# Patient Record
Sex: FEMALE | Race: WHITE | NOT HISPANIC OR LATINO | Employment: FULL TIME | URBAN - METROPOLITAN AREA
[De-identification: names, ages, dates, MRNs, and addresses within clinical notes are randomized per-mention and may not be internally consistent; named-entity substitution may affect disease eponyms.]

---

## 2017-03-16 ENCOUNTER — OFFICE VISIT (OUTPATIENT)
Dept: FAMILY MEDICINE CLINIC | Facility: CLINIC | Age: 47
End: 2017-03-16

## 2017-03-16 ENCOUNTER — APPOINTMENT (OUTPATIENT)
Dept: LAB | Facility: HOSPITAL | Age: 47
End: 2017-03-16

## 2017-03-16 VITALS
WEIGHT: 202 LBS | BODY MASS INDEX: 34.67 KG/M2 | TEMPERATURE: 98.2 F | OXYGEN SATURATION: 99 % | DIASTOLIC BLOOD PRESSURE: 82 MMHG | SYSTOLIC BLOOD PRESSURE: 106 MMHG | HEART RATE: 108 BPM

## 2017-03-16 DIAGNOSIS — I95.89 OTHER SPECIFIED HYPOTENSION: ICD-10-CM

## 2017-03-16 DIAGNOSIS — R10.13 EPIGASTRIC ABDOMINAL PAIN: Primary | ICD-10-CM

## 2017-03-16 DIAGNOSIS — R00.0 TACHYCARDIA: ICD-10-CM

## 2017-03-16 DIAGNOSIS — R42 DIZZINESS: ICD-10-CM

## 2017-03-16 LAB
ALBUMIN SERPL-MCNC: 4.5 G/DL (ref 3.2–4.8)
ALBUMIN/GLOB SERPL: 1.8 G/DL (ref 1.5–2.5)
ALP SERPL-CCNC: 68 U/L (ref 25–100)
ALT SERPL W P-5'-P-CCNC: 14 U/L (ref 7–40)
AMYLASE SERPL-CCNC: 42 U/L (ref 30–118)
ANION GAP SERPL CALCULATED.3IONS-SCNC: 4 MMOL/L (ref 3–11)
AST SERPL-CCNC: 14 U/L (ref 0–33)
BILIRUB SERPL-MCNC: 0.4 MG/DL (ref 0.3–1.2)
BUN BLD-MCNC: 12 MG/DL (ref 9–23)
BUN/CREAT SERPL: 17.1 (ref 7–25)
CALCIUM SPEC-SCNC: 9.7 MG/DL (ref 8.7–10.4)
CHLORIDE SERPL-SCNC: 105 MMOL/L (ref 99–109)
CO2 SERPL-SCNC: 31 MMOL/L (ref 20–31)
CREAT BLD-MCNC: 0.7 MG/DL (ref 0.6–1.3)
ERYTHROCYTE [DISTWIDTH] IN BLOOD BY AUTOMATED COUNT: 13.3 % (ref 11.3–14.5)
GFR SERPL CREATININE-BSD FRML MDRD: 90 ML/MIN/1.73
GLOBULIN UR ELPH-MCNC: 2.5 GM/DL
GLUCOSE BLD-MCNC: 83 MG/DL (ref 70–100)
HCT VFR BLD AUTO: 35.8 % (ref 34.5–44)
HGB BLD-MCNC: 11.8 G/DL (ref 11.5–15.5)
LIPASE SERPL-CCNC: 48 U/L (ref 6–51)
LYMPHOCYTES # BLD AUTO: 3.4 10*3/MM3 (ref 0.6–4.8)
LYMPHOCYTES NFR BLD AUTO: 24.2 % (ref 24–44)
MCH RBC QN AUTO: 28.6 PG (ref 27–31)
MCHC RBC AUTO-ENTMCNC: 32.9 G/DL (ref 32–36)
MCV RBC AUTO: 86.8 FL (ref 80–99)
MONOCYTES # BLD AUTO: 0.7 10*3/MM3 (ref 0–1)
MONOCYTES NFR BLD AUTO: 5 % (ref 0–12)
NEUTROPHILS # BLD AUTO: 9.9 10*3/MM3 (ref 1.5–8.3)
NEUTROPHILS NFR BLD AUTO: 70.8 % (ref 41–71)
PLATELET # BLD AUTO: 438 10*3/MM3 (ref 150–450)
PMV BLD AUTO: 8.8 FL (ref 6–12)
POTASSIUM BLD-SCNC: 4.1 MMOL/L (ref 3.5–5.5)
PROT SERPL-MCNC: 7 G/DL (ref 5.7–8.2)
RBC # BLD AUTO: 4.13 10*6/MM3 (ref 3.89–5.14)
SODIUM BLD-SCNC: 140 MMOL/L (ref 132–146)
WBC NRBC COR # BLD: 14 10*3/MM3 (ref 3.5–10.8)

## 2017-03-16 PROCEDURE — 36415 COLL VENOUS BLD VENIPUNCTURE: CPT | Performed by: PHYSICIAN ASSISTANT

## 2017-03-16 PROCEDURE — 83690 ASSAY OF LIPASE: CPT | Performed by: PHYSICIAN ASSISTANT

## 2017-03-16 PROCEDURE — 82150 ASSAY OF AMYLASE: CPT | Performed by: PHYSICIAN ASSISTANT

## 2017-03-16 PROCEDURE — 85025 COMPLETE CBC W/AUTO DIFF WBC: CPT | Performed by: PHYSICIAN ASSISTANT

## 2017-03-16 PROCEDURE — 80053 COMPREHEN METABOLIC PANEL: CPT | Performed by: PHYSICIAN ASSISTANT

## 2017-03-16 PROCEDURE — 99214 OFFICE O/P EST MOD 30 MIN: CPT | Performed by: PHYSICIAN ASSISTANT

## 2017-03-16 RX ORDER — TRIAMTERENE AND HYDROCHLOROTHIAZIDE 37.5; 25 MG/1; MG/1
TABLET ORAL
Qty: 30 TABLET | Refills: 0 | Status: SHIPPED | OUTPATIENT
Start: 2017-03-16 | End: 2017-07-03 | Stop reason: SDUPTHER

## 2017-03-16 RX ORDER — SUCRALFATE 1 G/1
1 TABLET ORAL 4 TIMES DAILY
Qty: 40 TABLET | Refills: 0 | Status: SHIPPED | OUTPATIENT
Start: 2017-03-16 | End: 2018-03-20

## 2017-03-16 RX ORDER — ONDANSETRON HYDROCHLORIDE 8 MG/1
8 TABLET, FILM COATED ORAL EVERY 8 HOURS PRN
Qty: 15 TABLET | Refills: 0 | Status: SHIPPED | OUTPATIENT
Start: 2017-03-16 | End: 2019-03-26 | Stop reason: SDUPTHER

## 2017-03-16 RX ORDER — PANTOPRAZOLE SODIUM 40 MG/1
40 TABLET, DELAYED RELEASE ORAL DAILY
Qty: 30 TABLET | Refills: 0 | Status: SHIPPED | OUTPATIENT
Start: 2017-03-16 | End: 2018-03-20

## 2017-03-16 NOTE — PROGRESS NOTES
Subjective   Aniya Castro is a 46 y.o. female    History of Present Illness    Patient presents today for evaluation of acute abdominal pain.she states the pain started on Friday midday.  She had eaten check folate for breakfast and started developing nausea and some midepigastric abdominal pain and upper chest discomfort around lunchtime.  She felt that she was having some indigestion from the meal that she had for breakfast.  However she states that progressed to where her abdominal pain became more intense moving somewhat to the left and she ended up vomiting a couple of times over the next several days.  Last time she threw up was yesterday.  She states the vomiting has been infrequent.  Her bowel movements have been daily, no diarrhea but she has noticed the color change and word is black and tarry.  She states that she has increased abdominal pain after eating any meal.  She has not run a fever.  She has no history of GI problems and nothing similar to this in the past.  No history of peptic ulcer disease.  She does not take NSAIDs.  She states she has felt dizzy and weak over the past few days. She tried Bentyl and it did not help at all.  Mylanta helps minimally for about an hour.  The following portions of the patient's history were reviewed and updated as appropriate: allergies, current medications, past social history and problem list    Review of Systems   Constitutional: Positive for fatigue. Negative for appetite change, chills, fever and unexpected weight change.   Respiratory: Negative for cough, chest tightness and shortness of breath.    Cardiovascular: Negative for chest pain and palpitations.   Gastrointestinal: Positive for abdominal pain. Negative for abdominal distention, anal bleeding, blood in stool, constipation, diarrhea, nausea, rectal pain and vomiting.   Genitourinary: Negative.  Negative for difficulty urinating and dysuria.   Musculoskeletal: Negative for back pain.   Skin:  Negative for color change and rash.   Allergic/Immunologic: Negative for food allergies.   Neurological: Positive for dizziness and light-headedness.       Objective     Vitals:    03/16/17 1002   BP: 106/82   Pulse: 108   Temp: 98.2 °F (36.8 °C)   SpO2: 99%       Physical Exam   Constitutional: She is oriented to person, place, and time. She appears well-developed and well-nourished. No distress.   HENT:   Right Ear: Hearing and tympanic membrane normal.   Left Ear: Hearing and tympanic membrane normal.   Eyes: Conjunctivae are normal.   Neck: Normal carotid pulses present. Carotid bruit is not present.   Cardiovascular: Normal rate, regular rhythm and normal heart sounds.    Pulmonary/Chest: Effort normal and breath sounds normal.   Abdominal: Soft. Bowel sounds are normal. She exhibits no distension and no mass. There is tenderness ( patient has moderate tenderness midepigastrium but more severe pain noted over the left upper quadrant and periumbilical region centrally.  No lower quadrant tenderness.  She does have guarding in her mid epigastrium.). There is no rebound and no guarding. No hernia.   Neurological: She is alert and oriented to person, place, and time. She displays normal reflexes. No cranial nerve deficit. She exhibits normal muscle tone. Coordination normal.   Skin: Skin is warm and dry. No rash noted. She is not diaphoretic. No erythema. There is pallor.   Psychiatric: She has a normal mood and affect. Her behavior is normal.   Nursing note and vitals reviewed.      Assessment/Plan     Diagnoses and all orders for this visit:    Epigastric abdominal pain  -     ondansetron (ZOFRAN) 8 MG tablet; Take 1 tablet by mouth Every 8 (Eight) Hours As Needed for Nausea or Vomiting.  -     sucralfate (CARAFATE) 1 G tablet; Take 1 tablet by mouth 4 (Four) Times a Day.  -     pantoprazole (PROTONIX) 40 MG EC tablet; Take 1 tablet by mouth Daily. Take one each morning  -     CBC & Differential  -      Comprehensive Metabolic Panel  -     Amylase  -     Lipase  -     CBC Auto Differential    Other specified hypotension    Dizziness    Tachycardia    i discussed with patient my concerns of possible GI bleed with the reported history of melena association with hypotension, tachycardia and dizziness with abdominal pain.  We will check stat CBC today and I'll call her back with results.  Advised her to follow a bland diet today.

## 2017-07-03 ENCOUNTER — OFFICE VISIT (OUTPATIENT)
Dept: FAMILY MEDICINE CLINIC | Facility: CLINIC | Age: 47
End: 2017-07-03

## 2017-07-03 VITALS
TEMPERATURE: 98.7 F | OXYGEN SATURATION: 98 % | WEIGHT: 210 LBS | HEART RATE: 68 BPM | DIASTOLIC BLOOD PRESSURE: 98 MMHG | SYSTOLIC BLOOD PRESSURE: 138 MMHG | BODY MASS INDEX: 35.85 KG/M2 | HEIGHT: 64 IN

## 2017-07-03 DIAGNOSIS — I10 ESSENTIAL HYPERTENSION: ICD-10-CM

## 2017-07-03 DIAGNOSIS — E66.3 OVERWEIGHT: ICD-10-CM

## 2017-07-03 DIAGNOSIS — F41.1 ANXIETY, GENERALIZED: ICD-10-CM

## 2017-07-03 DIAGNOSIS — F06.31 ORGANIC MOOD DISORDER OF DEPRESSED TYPE: Primary | ICD-10-CM

## 2017-07-03 PROCEDURE — 99214 OFFICE O/P EST MOD 30 MIN: CPT | Performed by: FAMILY MEDICINE

## 2017-07-03 RX ORDER — TRIAMTERENE AND HYDROCHLOROTHIAZIDE 37.5; 25 MG/1; MG/1
1 TABLET ORAL DAILY
Qty: 30 TABLET | Refills: 5 | Status: SHIPPED | OUTPATIENT
Start: 2017-07-03 | End: 2017-08-08 | Stop reason: DRUGHIGH

## 2017-07-03 RX ORDER — FLUOXETINE HYDROCHLORIDE 20 MG/1
20 CAPSULE ORAL DAILY
Qty: 30 CAPSULE | Refills: 1 | Status: SHIPPED | OUTPATIENT
Start: 2017-07-03 | End: 2017-08-01 | Stop reason: DRUGHIGH

## 2017-07-03 NOTE — PROGRESS NOTES
Subjective   Aniya Castro is a 47 y.o. female    HPI Comments: Patient presents today for recheck of hypertension, depression and weight gain.  Trial of phentermine 6 months ago was stopped due to tongue swelling associated with the phentermine capsule.  She is asking about other options to help with weight loss.  We discussed Contrave and she would like to try this.  She also feels that her depression has resolved and would like to go off the fluoxetine.  We discussed weaning off the medication over the next 6-8 weeks.  Her blood pressure is elevated today at 138/98.  She is out of her triamterene HCTZ medication.    Hypertension   This is a chronic problem. The current episode started more than 1 year ago. The problem is unchanged. The problem is controlled. Associated symptoms include anxiety. Pertinent negatives include no blurred vision, chest pain, headaches, malaise/fatigue, orthopnea, palpitations, peripheral edema, PND, shortness of breath or sweats. There are no associated agents to hypertension. Risk factors for coronary artery disease include family history, obesity and stress. Compliance problems include diet and exercise.    Depression Patient presents with the following symptoms: nervousness/anxiety.  Patient is not experiencing: palpitations and shortness of breath.        The following portions of the patient's history were reviewed and updated as appropriate: allergies, current medications, past social history and problem list    Review of Systems   Constitutional: Negative for fatigue, malaise/fatigue and unexpected weight change.   HENT: Negative.    Eyes: Negative for blurred vision.   Respiratory: Negative for cough, chest tightness and shortness of breath.    Cardiovascular: Negative for chest pain, palpitations, orthopnea, leg swelling and PND.   Gastrointestinal: Negative for nausea.   Endocrine: Negative for polydipsia, polyphagia and polyuria.   Musculoskeletal: Negative for  arthralgias and myalgias.   Skin: Negative for color change and rash.   Neurological: Negative for dizziness, syncope, weakness and headaches.   Hematological: Negative for adenopathy. Does not bruise/bleed easily.   Psychiatric/Behavioral: Negative for dysphoric mood. The patient is nervous/anxious.        Objective     Vitals:    07/03/17 1143   BP: 138/98   Pulse: 68   Temp: 98.7 °F (37.1 °C)   SpO2: 98%       Physical Exam   Constitutional: She is oriented to person, place, and time. She appears well-developed and well-nourished.   HENT:   Head: Normocephalic and atraumatic.   Eyes: Conjunctivae are normal. Pupils are equal, round, and reactive to light.   Neck: No JVD present. No thyromegaly present.   Cardiovascular: Normal rate, regular rhythm, normal heart sounds, intact distal pulses and normal pulses.    No murmur heard.  Pulmonary/Chest: Effort normal and breath sounds normal. No respiratory distress.   Abdominal: Soft. Bowel sounds are normal. There is no hepatosplenomegaly. There is no tenderness.   Musculoskeletal: She exhibits no edema or deformity.   Neurological: She is alert and oriented to person, place, and time.   Skin: Skin is warm and dry.   Psychiatric: She has a normal mood and affect. Her behavior is normal.   Nursing note and vitals reviewed.      Assessment/Plan   Problem List Items Addressed This Visit        Cardiovascular and Mediastinum    HTN (hypertension)    Relevant Medications    triamterene-hydrochlorothiazide (MAXZIDE-25) 37.5-25 MG per tablet       Nervous and Auditory    Organic mood disorder of depressed type - Primary    Relevant Medications    naltrexone-bupropion ER (CONTRAVE) 8-90 MG tablet    FLUoxetine (PROzac) 20 MG capsule       Other    Anxiety, generalized    Relevant Medications    naltrexone-bupropion ER (CONTRAVE) 8-90 MG tablet    FLUoxetine (PROzac) 20 MG capsule      Other Visit Diagnoses     Overweight        Relevant Medications    naltrexone-bupropion ER  (CONTRAVE) 8-90 MG tablet

## 2017-08-01 ENCOUNTER — TELEPHONE (OUTPATIENT)
Dept: FAMILY MEDICINE CLINIC | Facility: CLINIC | Age: 47
End: 2017-08-01

## 2017-08-01 RX ORDER — FLUOXETINE HYDROCHLORIDE 40 MG/1
40 CAPSULE ORAL DAILY
Qty: 30 CAPSULE | Refills: 5 | Status: SHIPPED | OUTPATIENT
Start: 2017-08-01 | End: 2018-03-20 | Stop reason: SDUPTHER

## 2017-08-01 NOTE — TELEPHONE ENCOUNTER
----- Message from Robyn Allen sent at 8/1/2017  9:21 AM EDT -----  Contact: patient  Medication FLUoxetine (PROzac) was decreased to 20mg two month ago and she was told to call and let him know if decreasing it worked. She wanted to know if she can go back to the 40mg daily because she is not going well on just the 20mg.  Please let her know. Thank you

## 2017-08-08 ENCOUNTER — TELEPHONE (OUTPATIENT)
Dept: FAMILY MEDICINE CLINIC | Facility: CLINIC | Age: 47
End: 2017-08-08

## 2017-08-08 ENCOUNTER — OFFICE VISIT (OUTPATIENT)
Dept: FAMILY MEDICINE CLINIC | Facility: CLINIC | Age: 47
End: 2017-08-08

## 2017-08-08 VITALS
BODY MASS INDEX: 35.85 KG/M2 | TEMPERATURE: 98.8 F | WEIGHT: 210 LBS | HEIGHT: 64 IN | SYSTOLIC BLOOD PRESSURE: 152 MMHG | RESPIRATION RATE: 16 BRPM | DIASTOLIC BLOOD PRESSURE: 84 MMHG

## 2017-08-08 DIAGNOSIS — R51.9 ACUTE INTRACTABLE HEADACHE, UNSPECIFIED HEADACHE TYPE: ICD-10-CM

## 2017-08-08 DIAGNOSIS — F41.1 ANXIETY, GENERALIZED: ICD-10-CM

## 2017-08-08 DIAGNOSIS — I10 HYPERTENSION, UNCONTROLLED: Primary | ICD-10-CM

## 2017-08-08 DIAGNOSIS — R42 DIZZINESS: ICD-10-CM

## 2017-08-08 DIAGNOSIS — I10 ESSENTIAL HYPERTENSION: ICD-10-CM

## 2017-08-08 DIAGNOSIS — Z82.49 FAMILY HISTORY OF ANEURYSM: ICD-10-CM

## 2017-08-08 PROCEDURE — 99214 OFFICE O/P EST MOD 30 MIN: CPT | Performed by: PHYSICIAN ASSISTANT

## 2017-08-08 PROCEDURE — 93000 ELECTROCARDIOGRAM COMPLETE: CPT | Performed by: PHYSICIAN ASSISTANT

## 2017-08-08 RX ORDER — TRIAMTERENE AND HYDROCHLOROTHIAZIDE 75; 50 MG/1; MG/1
1 TABLET ORAL DAILY
Qty: 30 TABLET | Refills: 1 | Status: SHIPPED | OUTPATIENT
Start: 2017-08-08 | End: 2018-03-20 | Stop reason: SDUPTHER

## 2017-08-08 NOTE — PROGRESS NOTES
Subjective   Aniya Castro is a 47 y.o. female    History of Present Illness  Patient presents today with 2 separate new concerns.  Her blood pressure has been uncontrolled over the past couple of days with systolic blood pressure consistently running greater than 160 and at times her diastolic running greater than 90.  She is not experiencing any chest pain but has experienced some numbness down her entire left arm from her shoulder to her thumb yesterday.  She states she took some aspirin and went away.  She is very concerned about this because she has a strong family history for coronary artery disease.  Her second concern is of a headache with lightheadedness she's been expressing for the past couple of days.  She states that multiple people in her family have experienced brain aneurysms and this frightens her.  She denies any visual change, no head trauma, no syncope but states that her head feels like it's throbbing and pulsating and she feels off balance.  The following portions of the patient's history were reviewed and updated as appropriate: allergies, current medications, past social history and problem list    Review of Systems   Constitutional: Negative for activity change and fever.   HENT: Negative for ear pain.    Eyes: Negative.    Respiratory: Negative for chest tightness and shortness of breath.    Cardiovascular: Negative for chest pain and palpitations.   Gastrointestinal: Negative for abdominal pain, diarrhea, nausea and vomiting.   Genitourinary: Negative for difficulty urinating and dysuria.   Musculoskeletal: Positive for neck stiffness. Negative for gait problem.   Neurological: Positive for dizziness, light-headedness, numbness ( left arm numbness yesterday shoulder to thumb. went away with  ASA) and headaches. Negative for seizures, syncope, facial asymmetry and weakness.   Psychiatric/Behavioral: The patient is nervous/anxious.        Objective     Vitals:    08/08/17 0934   BP:  152/84   Resp: 16   Temp: 98.8 °F (37.1 °C)       Physical Exam   Constitutional: She is oriented to person, place, and time. She appears well-developed and well-nourished. No distress.   HENT:   Head: Normocephalic and atraumatic.   Right Ear: Hearing and tympanic membrane normal.   Left Ear: Hearing and tympanic membrane normal.   Eyes: Conjunctivae and EOM are normal. Pupils are equal, round, and reactive to light.   Neck: Normal range of motion. Neck supple. Normal carotid pulses and no JVD present. Muscular tenderness present. No spinous process tenderness present. Carotid bruit is not present. No rigidity. Normal range of motion present.   Cardiovascular: Normal rate, regular rhythm, normal heart sounds and intact distal pulses.    No murmur heard.  Pulmonary/Chest: Effort normal and breath sounds normal. No respiratory distress. She exhibits no tenderness.   Abdominal: Soft. She exhibits no distension. There is no tenderness.   Musculoskeletal: Normal range of motion. She exhibits no edema.   Neurological: She is alert and oriented to person, place, and time. She displays normal reflexes. No cranial nerve deficit or sensory deficit. She exhibits normal muscle tone. Coordination normal.   Skin: Skin is warm and dry. She is not diaphoretic. No erythema. No pallor.   Psychiatric: Her speech is normal and behavior is normal. Judgment and thought content normal. Her mood appears anxious. Cognition and memory are normal.   Nursing note and vitals reviewed.      ECG 12 Lead  Date/Time: 8/8/2017 10:15 AM  Performed by: LONA BUTLER  Authorized by: LONA BUTLER   Comparison: not compared with previous ECG   Previous ECG: no previous ECG available  Rhythm: sinus rhythm  Rate: normal  BPM: 88  Conduction: conduction normal  ST Segments: ST segments normal  T Waves: T waves normal  QRS axis: normal  Other findings: PRWP  Clinical impression: normal ECG  Comments: Poor R-wave progression appears normal  variant, no acute changes seen, results discussed with patient in office            Assessment/Plan     Diagnoses and all orders for this visit:    Hypertension, uncontrolled    Dizziness  -     MRI Brain With & Without Contrast    Acute intractable headache, unspecified headache type  -     MRI Brain With & Without Contrast    Anxiety, generalized    Essential hypertension    Family history of aneurysm    Other orders  -     triamterene-hydrochlorothiazide (MAXZIDE) 75-50 MG per tablet; Take 1 tablet by mouth Daily.  -     ECG 12 Lead    Increased dosage of Maxzide to 75/50 take 1 daily, monitor blood pressure and follow-up if unimproved within 2-3 weeks, recommended continuing aspirin as needed for headache and/or arm pain, check MRI of head for further evaluation, go to ER if symptoms worsen acutely.

## 2017-08-08 NOTE — TELEPHONE ENCOUNTER
I just saw this patient this morning and medication was just adjusted this morning.  Reassure her that it will take a few days for her blood pressure to stabilize and this is okay.  Call if symptoms unimproved by Friday.

## 2017-08-08 NOTE — TELEPHONE ENCOUNTER
----- Message from Betzy Palacios sent at 8/8/2017  2:27 PM EDT -----  Pt doubled her dose of b/p meds and took aspirin and b/p still running high (166/86) taken at 2:00. What should she do?    Pt # [phone] or [phone]

## 2017-08-10 ENCOUNTER — HOSPITAL ENCOUNTER (OUTPATIENT)
Dept: MRI IMAGING | Facility: HOSPITAL | Age: 47
Discharge: HOME | End: 2017-08-10
Admitting: PHYSICIAN ASSISTANT

## 2017-08-10 PROCEDURE — 0 GADOBENATE DIMEGLUMINE 529 MG/ML SOLUTION: Performed by: PHYSICIAN ASSISTANT

## 2017-08-10 PROCEDURE — 70553 MRI BRAIN STEM W/O & W/DYE: CPT

## 2017-08-10 PROCEDURE — A9577 INJ MULTIHANCE: HCPCS | Performed by: PHYSICIAN ASSISTANT

## 2017-08-10 RX ADMIN — GADOBENATE DIMEGLUMINE 20 ML: 529 INJECTION, SOLUTION INTRAVENOUS at 15:30

## 2017-08-11 ENCOUNTER — TELEPHONE (OUTPATIENT)
Dept: FAMILY MEDICINE CLINIC | Facility: CLINIC | Age: 47
End: 2017-08-11

## 2017-08-11 NOTE — TELEPHONE ENCOUNTER
----- Message from Robyn Allen sent at 8/11/2017  2:06 PM EDT -----  Contact: PATIENT  SHE SAW LONA ON THE 8TH FOR BLOOD PRESSURE AND HEADACHE. BP HAS GOT BETTER STAYING AROUND 136/90, STILL HAS HEADACHE. WANTED TO KNOW IF THERE WAS SOMETHING ELSE SHE COULD DO OR TAKE THAT WOULD HELP.  HAD MRI DONE AT [facility] YESTERDAY,'  PLEASE CALL HER BACK WITH ANY SUGGESTIONS. THANK YOU   [phone]

## 2017-08-11 NOTE — TELEPHONE ENCOUNTER
Prescription has been sent in for anti-inflammatory pain medication to take for headache, call if unimproved after the weekend

## 2017-08-14 ENCOUNTER — TELEPHONE (OUTPATIENT)
Dept: FAMILY MEDICINE CLINIC | Facility: CLINIC | Age: 47
End: 2017-08-14

## 2017-08-14 NOTE — TELEPHONE ENCOUNTER
----- Message from Robyn Allen sent at 8/14/2017  2:02 PM EDT -----  Contact: PATIENT  LONA CALLED HER IN SOME MEDICATION ON Friday FOR HEADACHES, SHE STILL HAS IT, AND IS NAUSEATED. HAS NOT HEARD FROM ANYONE ABOUT THE MRI RESULTS LAST WEEK, SHE WANTS TO KNOW WHAT TO DO AT THIS POINT. DOES SHE NEED TO COME BACK IN ? PLEASE CALL HER BACK.  THANK YOU       appt?

## 2017-08-15 NOTE — TELEPHONE ENCOUNTER
I sent a message back yesterday morning regarding her MRI results being normal, please notify her of this.  I believe we also sent a new medication in on Friday for her to try for the headache.  If this is not working then I recommend she follow up in the office this week for further evaluation.

## 2017-08-15 NOTE — TELEPHONE ENCOUNTER
Spoke to patient and notified her of MRI and also informed her that she needs to make another appt if headaches have persisted. She states she usually sees Dr. Duggan and will schedule a follow up with him.

## 2017-08-22 ENCOUNTER — OFFICE VISIT (OUTPATIENT)
Dept: FAMILY MEDICINE CLINIC | Facility: CLINIC | Age: 47
End: 2017-08-22

## 2017-08-22 VITALS
OXYGEN SATURATION: 98 % | DIASTOLIC BLOOD PRESSURE: 94 MMHG | SYSTOLIC BLOOD PRESSURE: 132 MMHG | BODY MASS INDEX: 35.17 KG/M2 | HEART RATE: 84 BPM | HEIGHT: 64 IN | WEIGHT: 206 LBS | TEMPERATURE: 97.9 F

## 2017-08-22 DIAGNOSIS — I10 ESSENTIAL HYPERTENSION: ICD-10-CM

## 2017-08-22 DIAGNOSIS — H83.01 LABYRINTHITIS, RIGHT: ICD-10-CM

## 2017-08-22 DIAGNOSIS — H70.91 MASTOIDITIS, RIGHT: Primary | ICD-10-CM

## 2017-08-22 PROCEDURE — 99213 OFFICE O/P EST LOW 20 MIN: CPT | Performed by: FAMILY MEDICINE

## 2017-08-22 RX ORDER — MECLIZINE HYDROCHLORIDE 25 MG/1
25 TABLET ORAL NIGHTLY
Qty: 15 TABLET | Refills: 0 | Status: SHIPPED | OUTPATIENT
Start: 2017-08-22 | End: 2018-03-20

## 2017-08-22 RX ORDER — AMOXICILLIN AND CLAVULANATE POTASSIUM 875; 125 MG/1; MG/1
1 TABLET, FILM COATED ORAL 2 TIMES DAILY
Qty: 20 TABLET | Refills: 0 | Status: SHIPPED | OUTPATIENT
Start: 2017-08-22 | End: 2017-09-01

## 2017-08-22 NOTE — PROGRESS NOTES
Subjective   Aniya Castro is a 47 y.o. female    HPI Comments: Patient presents for follow-up of headache, elevated blood pressure, dizziness, nausea and crackling sounds in her right ear.  She was initially treated with an increase in her antihypertensive medication which has helped her blood pressure somewhat.  She also had an MRI of her brain done which revealed some fluid in her right mastoid area.  She is concerned about this.    Headache    Pertinent negatives include no coughing, dizziness, eye pain, nausea, numbness, photophobia, sinus pressure, sore throat, vomiting or weakness.       The following portions of the patient's history were reviewed and updated as appropriate: allergies, current medications, past social history and problem list    Review of Systems   Constitutional: Negative for fatigue and unexpected weight change.   HENT: Negative for congestion, dental problem, postnasal drip, sinus pressure and sore throat.    Eyes: Negative for photophobia, pain and visual disturbance.   Respiratory: Negative for cough, chest tightness and shortness of breath.    Cardiovascular: Negative for chest pain, palpitations and leg swelling.   Gastrointestinal: Negative for nausea and vomiting.   Skin: Negative for color change and rash.   Neurological: Positive for headaches. Negative for dizziness, syncope, facial asymmetry, speech difficulty, weakness, light-headedness and numbness.   Psychiatric/Behavioral: Negative for agitation, confusion, dysphoric mood and sleep disturbance. The patient is not nervous/anxious.        Objective     Vitals:    08/22/17 1604   BP: 132/94   Pulse: 84   Temp: 97.9 °F (36.6 °C)   SpO2: 98%       Physical Exam   Constitutional: She is oriented to person, place, and time. She appears well-developed and well-nourished.   HENT:   Head: Normocephalic and atraumatic.   Right Ear: Ear canal normal. There is mastoid tenderness. Tympanic membrane mobility is abnormal. A middle ear  effusion is present.   Left Ear: Tympanic membrane and ear canal normal.   Eyes: Conjunctivae and EOM are normal. Pupils are equal, round, and reactive to light.   Neck: Normal range of motion. Neck supple. No JVD present.   Cardiovascular: Normal rate, regular rhythm, normal heart sounds, intact distal pulses and normal pulses.    No murmur heard.  Pulmonary/Chest: Effort normal and breath sounds normal. No respiratory distress.   Abdominal: Soft. Bowel sounds are normal. There is no hepatosplenomegaly. There is no tenderness.   Musculoskeletal: She exhibits no edema.   Neurological: She is alert and oriented to person, place, and time. No cranial nerve deficit or sensory deficit.   Skin: Skin is warm and dry.   Psychiatric: She has a normal mood and affect. Her speech is normal and behavior is normal. Judgment and thought content normal. Cognition and memory are normal.   Nursing note and vitals reviewed.      Assessment/Plan   Problem List Items Addressed This Visit        Cardiovascular and Mediastinum    HTN (hypertension)      Other Visit Diagnoses     Mastoiditis, right    -  Primary    Relevant Medications    amoxicillin-clavulanate (AUGMENTIN) 875-125 MG per tablet    Labyrinthitis, right        Relevant Medications    amoxicillin-clavulanate (AUGMENTIN) 875-125 MG per tablet    meclizine (ANTIVERT) 25 MG tablet

## 2017-08-28 ENCOUNTER — TELEPHONE (OUTPATIENT)
Dept: FAMILY MEDICINE CLINIC | Facility: CLINIC | Age: 47
End: 2017-08-28

## 2017-08-28 RX ORDER — LEVOFLOXACIN 500 MG/1
500 TABLET, FILM COATED ORAL DAILY
Qty: 10 TABLET | Refills: 0 | Status: SHIPPED | OUTPATIENT
Start: 2017-08-28 | End: 2018-03-20

## 2017-08-28 NOTE — TELEPHONE ENCOUNTER
----- Message from Andie Olsen sent at 8/28/2017 11:23 AM EDT -----  Pt sees dr. HILL    Saw dr. HILL last week for inner ear, dizziness and headache.  He put her on amoxicillin a week ago but she still has the bad headache and isn't feeling any better.  Is there a stronger med she can try or does she need to come in?  Allergic to sulfa drugs.  She uses [facility] on [address].  thanks

## 2017-08-31 ENCOUNTER — TELEPHONE (OUTPATIENT)
Dept: FAMILY MEDICINE CLINIC | Facility: CLINIC | Age: 47
End: 2017-08-31

## 2017-08-31 RX ORDER — FLUCONAZOLE 150 MG/1
150 TABLET ORAL ONCE
Qty: 1 TABLET | Refills: 0 | Status: SHIPPED | OUTPATIENT
Start: 2017-08-31 | End: 2017-08-31

## 2017-09-20 ENCOUNTER — TELEPHONE (OUTPATIENT)
Dept: FAMILY MEDICINE CLINIC | Facility: CLINIC | Age: 47
End: 2017-09-20

## 2017-09-20 RX ORDER — FLUCONAZOLE 200 MG/1
TABLET ORAL
Qty: 3 TABLET | Refills: 0 | Status: SHIPPED | OUTPATIENT
Start: 2017-09-20 | End: 2018-03-20

## 2017-09-20 NOTE — TELEPHONE ENCOUNTER
----- Message from Robyn Allen sent at 9/20/2017 10:48 AM EDT -----  Contact: patient  She still has yeast infection from antibiotics, said it got better for a little while but has came back with vengeance., burning, itching, pain     Can she get something called in today? If before 4:30 please call it to [facility] so she can go on and get it. But if after 4:30 call to regular [facility]

## 2018-02-02 ENCOUNTER — LAB (OUTPATIENT)
Dept: LAB | Facility: HOSPITAL | Age: 48
End: 2018-02-02

## 2018-02-02 DIAGNOSIS — R50.9 FEVER, UNSPECIFIED FEVER CAUSE: ICD-10-CM

## 2018-02-02 DIAGNOSIS — R05.9 COUGH: Primary | ICD-10-CM

## 2018-02-02 DIAGNOSIS — R05.9 COUGH: ICD-10-CM

## 2018-02-02 LAB
FLUAV AG NPH QL: NEGATIVE
FLUBV AG NPH QL IA: NEGATIVE

## 2018-02-02 PROCEDURE — 87804 INFLUENZA ASSAY W/OPTIC: CPT

## 2018-03-20 ENCOUNTER — OFFICE VISIT (OUTPATIENT)
Dept: FAMILY MEDICINE CLINIC | Facility: CLINIC | Age: 48
End: 2018-03-20

## 2018-03-20 VITALS
BODY MASS INDEX: 34.31 KG/M2 | RESPIRATION RATE: 16 BRPM | OXYGEN SATURATION: 99 % | DIASTOLIC BLOOD PRESSURE: 80 MMHG | HEIGHT: 64 IN | HEART RATE: 76 BPM | WEIGHT: 201 LBS | SYSTOLIC BLOOD PRESSURE: 130 MMHG

## 2018-03-20 DIAGNOSIS — F41.1 ANXIETY, GENERALIZED: ICD-10-CM

## 2018-03-20 DIAGNOSIS — Z13.220 LIPID SCREENING: ICD-10-CM

## 2018-03-20 DIAGNOSIS — Z83.3 FAMILY HISTORY OF DIABETES MELLITUS: ICD-10-CM

## 2018-03-20 DIAGNOSIS — I10 ESSENTIAL HYPERTENSION: Primary | ICD-10-CM

## 2018-03-20 DIAGNOSIS — J01.10 ACUTE NON-RECURRENT FRONTAL SINUSITIS: ICD-10-CM

## 2018-03-20 PROCEDURE — 99214 OFFICE O/P EST MOD 30 MIN: CPT | Performed by: FAMILY MEDICINE

## 2018-03-20 RX ORDER — TRIAMTERENE AND HYDROCHLOROTHIAZIDE 75; 50 MG/1; MG/1
1 TABLET ORAL DAILY
Qty: 30 TABLET | Refills: 1 | Status: SHIPPED | OUTPATIENT
Start: 2018-03-20 | End: 2018-06-12 | Stop reason: SDUPTHER

## 2018-03-20 RX ORDER — FLUOXETINE HYDROCHLORIDE 40 MG/1
40 CAPSULE ORAL DAILY
Qty: 30 CAPSULE | Refills: 5 | Status: SHIPPED | OUTPATIENT
Start: 2018-03-20 | End: 2018-11-15

## 2018-03-20 RX ORDER — CEFDINIR 300 MG/1
300 CAPSULE ORAL 2 TIMES DAILY
Qty: 20 CAPSULE | Refills: 0 | Status: SHIPPED | OUTPATIENT
Start: 2018-03-20 | End: 2018-06-26

## 2018-03-20 NOTE — PROGRESS NOTES
Subjective   Aniya Castro is a 47 y.o. female    Patient complains of headache primarily on the right side of her head and behind her right eye.  No visual changes associated with this.  No other neurologic changes.  She has had some nasal congestion and postnasal sinus drainage.  She was concerned it was blood pressure related but her blood pressure has been normal lately.  Patient also concerned about possible diabetes as her mother was recently diagnosed as a diabetic.  Patient also has a history of elevated lipids and would like a lab order to check her A1c and lipid panel.      Headache    Associated symptoms include ear pain, eye pain, rhinorrhea and sinus pressure. Pertinent negatives include no abdominal pain, coughing, dizziness, fever, nausea, sore throat or weakness.       The following portions of the patient's history were reviewed and updated as appropriate: allergies, current medications, past social history and problem list    Review of Systems   Constitutional: Negative for appetite change, chills, fatigue, fever and unexpected weight change.   HENT: Positive for congestion, ear pain, postnasal drip, rhinorrhea and sinus pressure. Negative for sore throat.    Eyes: Positive for pain.   Respiratory: Negative for cough, chest tightness and shortness of breath.    Cardiovascular: Negative for chest pain, palpitations and leg swelling.   Gastrointestinal: Negative for abdominal pain, diarrhea and nausea.   Skin: Negative for color change and rash.   Neurological: Positive for headaches. Negative for dizziness, tremors, syncope, weakness and light-headedness.   Hematological: Negative for adenopathy.   Psychiatric/Behavioral: Positive for dysphoric mood and sleep disturbance. Negative for agitation, behavioral problems, confusion, decreased concentration and suicidal ideas. The patient is nervous/anxious.        Objective     Vitals:    03/20/18 1421   BP: 130/80   Pulse: 76   Resp: 16   SpO2: 99%        Physical Exam   Constitutional: She is oriented to person, place, and time. She appears well-developed and well-nourished.   HENT:   Head: Normocephalic and atraumatic.   Right Ear: Tympanic membrane and ear canal normal.   Left Ear: Tympanic membrane and ear canal normal.   Nose: Mucosal edema, rhinorrhea and sinus tenderness present. Right sinus exhibits maxillary sinus tenderness and frontal sinus tenderness. Left sinus exhibits maxillary sinus tenderness and frontal sinus tenderness.   Mouth/Throat: Oropharynx is clear and moist. No oropharyngeal exudate.   Eyes: Pupils are equal, round, and reactive to light.   Neck: No JVD present.   Cardiovascular: Normal rate, regular rhythm, normal heart sounds, intact distal pulses and normal pulses.    No murmur heard.  Pulmonary/Chest: Effort normal and breath sounds normal. No respiratory distress.   Abdominal: Soft. Bowel sounds are normal. There is no hepatosplenomegaly. There is no tenderness.   Musculoskeletal: She exhibits no edema.   Neurological: She is alert and oriented to person, place, and time.   Skin: Skin is warm and dry.   Psychiatric: She has a normal mood and affect. Her behavior is normal.   Nursing note and vitals reviewed.      Assessment/Plan   Problem List Items Addressed This Visit        Cardiovascular and Mediastinum    HTN (hypertension) - Primary    Relevant Medications    triamterene-hydrochlorothiazide (MAXZIDE) 75-50 MG per tablet       Other    Anxiety, generalized    Relevant Medications    FLUoxetine (PROzac) 40 MG capsule      Other Visit Diagnoses     Family history of diabetes mellitus        Relevant Orders    Hemoglobin A1c    Lipid screening        Relevant Orders    Lipid Panel    Acute non-recurrent frontal sinusitis        Relevant Medications    cefdinir (OMNICEF) 300 MG capsule    loratadine-pseudoephedrine (CLARITIN-D 24-hour)  MG per 24 hr tablet

## 2018-03-22 ENCOUNTER — APPOINTMENT (OUTPATIENT)
Dept: LAB | Facility: HOSPITAL | Age: 48
End: 2018-03-22

## 2018-03-22 LAB
ARTICHOKE IGE QN: 158 MG/DL (ref 0–130)
CHOLEST SERPL-MCNC: 200 MG/DL (ref 0–200)
HBA1C MFR BLD: 6.2 % (ref 4.8–5.6)
HDLC SERPL-MCNC: 45 MG/DL (ref 40–60)
TRIGL SERPL-MCNC: 132 MG/DL (ref 0–150)

## 2018-03-22 PROCEDURE — 36415 COLL VENOUS BLD VENIPUNCTURE: CPT | Performed by: FAMILY MEDICINE

## 2018-03-22 PROCEDURE — 83036 HEMOGLOBIN GLYCOSYLATED A1C: CPT | Performed by: FAMILY MEDICINE

## 2018-03-22 PROCEDURE — 80061 LIPID PANEL: CPT | Performed by: FAMILY MEDICINE

## 2018-03-28 ENCOUNTER — TELEPHONE (OUTPATIENT)
Dept: FAMILY MEDICINE CLINIC | Facility: CLINIC | Age: 48
End: 2018-03-28

## 2018-03-28 RX ORDER — FLUCONAZOLE 150 MG/1
150 TABLET ORAL ONCE
Qty: 1 TABLET | Refills: 0 | Status: SHIPPED | OUTPATIENT
Start: 2018-03-28 | End: 2018-03-28

## 2018-03-28 NOTE — TELEPHONE ENCOUNTER
----- Message from Robyn Allen sent at 3/28/2018  9:09 AM EDT -----  Contact: PATIENT  SHE WAS IN A WEEK AGO AND PUT ON ANTIBIOTIC, NOW SHE HAS A YEAST INFECTION. CAN SOMETHING CALL IN TODAY FOR IT.    [address] - [phone]  - [phone] (Phone)  [phone] (Fax)

## 2018-06-12 DIAGNOSIS — I10 ESSENTIAL HYPERTENSION: ICD-10-CM

## 2018-06-12 RX ORDER — TRIAMTERENE AND HYDROCHLOROTHIAZIDE 75; 50 MG/1; MG/1
TABLET ORAL
Qty: 30 TABLET | Refills: 1 | Status: SHIPPED | OUTPATIENT
Start: 2018-06-12 | End: 2018-08-08 | Stop reason: SDUPTHER

## 2018-06-26 ENCOUNTER — HOSPITAL ENCOUNTER (OUTPATIENT)
Dept: GENERAL RADIOLOGY | Facility: HOSPITAL | Age: 48
Discharge: HOME | End: 2018-06-26
Admitting: PHYSICIAN ASSISTANT

## 2018-06-26 ENCOUNTER — OFFICE VISIT (OUTPATIENT)
Dept: FAMILY MEDICINE CLINIC | Facility: CLINIC | Age: 48
End: 2018-06-26

## 2018-06-26 VITALS
SYSTOLIC BLOOD PRESSURE: 134 MMHG | HEART RATE: 86 BPM | HEIGHT: 64 IN | OXYGEN SATURATION: 99 % | WEIGHT: 211 LBS | BODY MASS INDEX: 36.02 KG/M2 | TEMPERATURE: 98.8 F | DIASTOLIC BLOOD PRESSURE: 80 MMHG

## 2018-06-26 DIAGNOSIS — M25.531 WRIST PAIN, ACUTE, RIGHT: ICD-10-CM

## 2018-06-26 DIAGNOSIS — K29.00 ACUTE GASTRITIS WITHOUT HEMORRHAGE, UNSPECIFIED GASTRITIS TYPE: ICD-10-CM

## 2018-06-26 DIAGNOSIS — M25.531 WRIST PAIN, ACUTE, RIGHT: Primary | ICD-10-CM

## 2018-06-26 PROCEDURE — 73110 X-RAY EXAM OF WRIST: CPT

## 2018-06-26 PROCEDURE — 99214 OFFICE O/P EST MOD 30 MIN: CPT | Performed by: PHYSICIAN ASSISTANT

## 2018-06-26 RX ORDER — PANTOPRAZOLE SODIUM 40 MG/1
40 TABLET, DELAYED RELEASE ORAL DAILY
Qty: 30 TABLET | Refills: 1 | Status: SHIPPED | OUTPATIENT
Start: 2018-06-26 | End: 2019-02-20

## 2018-06-26 NOTE — PROGRESS NOTES
Subjective   Aniya Castro is a 47 y.o. female  Wrist Pain (right wrist pain due to recent fall x2 weeks ) and Abdominal Pain (increased abdomnial pain since Aleve )      History of Present Illness  Patient comes in today with 2 separate new problems.  She states that she fell twice within the past 2 weeks, recently fell very hard in the bathtub over the weekend landing on outstretched hand on her right wrist.  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  She's not having any numbness.  She started taking some ibuprofen and Aleve for the pain and since then has been having pain in her stomach.  No history of peptic ulcer disease diagnosis in the past.  She states she's been off of indigestion heartburn and burning sensation in her mid upper abdomen.  Bowel movements are normal, no blood in stool, no vomiting.  She tried Prevacid over-the-counter with some relief.  The following portions of the patient's history were reviewed and updated as appropriate: allergies, current medications, past social history and problem list    Review of Systems   Constitutional: Positive for activity change. Negative for appetite change, chills, fever and unexpected weight change.   Respiratory: Negative for cough, chest tightness and shortness of breath.    Cardiovascular: Negative for chest pain.   Gastrointestinal: Positive for abdominal pain. Negative for abdominal distention, blood in stool, constipation, diarrhea, nausea and vomiting.   Genitourinary: Negative for difficulty urinating and dysuria.   Musculoskeletal: Positive for arthralgias and myalgias. Negative for back pain, gait problem and joint swelling.   Skin: Negative.  Negative for color change and rash.   Allergic/Immunologic: Negative for food allergies.   Neurological: Negative for weakness and numbness.       Objective     Vitals:    06/26/18 1447   BP: 134/80    Pulse: 86   Temp: 98.8 °F (37.1 °C)   SpO2: 99%       Physical Exam   Constitutional: She is oriented to person, place, and time. She appears well-developed and well-nourished.   Eyes: Conjunctivae are normal.   Cardiovascular: Normal rate and regular rhythm.    Pulmonary/Chest: Effort normal and breath sounds normal.   Abdominal: Soft. Bowel sounds are normal. She exhibits no distension and no mass. There is tenderness ( Mild midepigastric tenderness). There is no rebound and no guarding. No hernia.   Musculoskeletal: She exhibits edema ( Mild soft tissue edema right wrist).   Neurological: She is alert and oriented to person, place, and time.   Neurovascular status intact right upper extremity   Skin: Skin is warm and dry. No rash noted. No erythema.   Mild ecchymosis right wrist   Psychiatric: She has a normal mood and affect. Her behavior is normal.   Nursing note and vitals reviewed.      Assessment/Plan     Diagnoses and all orders for this visit:    Wrist pain, acute, right  -     XR Wrist 3+ View Right; Future    Acute gastritis without hemorrhage, unspecified gastritis type    Other orders  -     pantoprazole (PROTONIX) 40 MG EC tablet; Take 1 tablet by mouth Daily. Take each morning before breakfast

## 2018-06-27 ENCOUNTER — APPOINTMENT (OUTPATIENT)
Dept: LAB | Facility: HOSPITAL | Age: 48
End: 2018-06-27

## 2018-08-08 DIAGNOSIS — I10 ESSENTIAL HYPERTENSION: ICD-10-CM

## 2018-08-09 RX ORDER — TRIAMTERENE AND HYDROCHLOROTHIAZIDE 75; 50 MG/1; MG/1
TABLET ORAL
Qty: 30 TABLET | Refills: 1 | Status: SHIPPED | OUTPATIENT
Start: 2018-08-09 | End: 2018-11-15

## 2018-10-24 ENCOUNTER — TRANSCRIBE ORDERS (OUTPATIENT)
Dept: MAMMOGRAPHY | Facility: HOSPITAL | Age: 48
End: 2018-10-24

## 2018-10-24 DIAGNOSIS — Z12.31 VISIT FOR SCREENING MAMMOGRAM: Primary | ICD-10-CM

## 2018-11-15 ENCOUNTER — OFFICE VISIT (OUTPATIENT)
Dept: FAMILY MEDICINE CLINIC | Facility: CLINIC | Age: 48
End: 2018-11-15

## 2018-11-15 VITALS
OXYGEN SATURATION: 99 % | WEIGHT: 180 LBS | SYSTOLIC BLOOD PRESSURE: 100 MMHG | BODY MASS INDEX: 30.73 KG/M2 | HEIGHT: 64 IN | DIASTOLIC BLOOD PRESSURE: 60 MMHG | TEMPERATURE: 98 F | HEART RATE: 105 BPM

## 2018-11-15 DIAGNOSIS — R06.83 SNORING: ICD-10-CM

## 2018-11-15 DIAGNOSIS — F41.1 ANXIETY, GENERALIZED: ICD-10-CM

## 2018-11-15 DIAGNOSIS — I10 ESSENTIAL HYPERTENSION: Primary | ICD-10-CM

## 2018-11-15 DIAGNOSIS — F32.A DEPRESSION, UNSPECIFIED DEPRESSION TYPE: ICD-10-CM

## 2018-11-15 DIAGNOSIS — R40.0 DAYTIME SOMNOLENCE: ICD-10-CM

## 2018-11-15 PROCEDURE — 99214 OFFICE O/P EST MOD 30 MIN: CPT | Performed by: FAMILY MEDICINE

## 2018-11-15 RX ORDER — TRIAMTERENE AND HYDROCHLOROTHIAZIDE 37.5; 25 MG/1; MG/1
1 TABLET ORAL DAILY
Qty: 30 TABLET | Refills: 5 | Status: SHIPPED | OUTPATIENT
Start: 2018-11-15 | End: 2019-08-23 | Stop reason: SDUPTHER

## 2018-11-15 RX ORDER — FLUOXETINE HYDROCHLORIDE 20 MG/1
CAPSULE ORAL
Qty: 60 CAPSULE | Refills: 5 | Status: SHIPPED | OUTPATIENT
Start: 2018-11-15 | End: 2019-08-23 | Stop reason: SDUPTHER

## 2018-11-15 NOTE — PROGRESS NOTES
Subjective   Aniya Castro is a 48 y.o. female    Chief Complaint    High blood pressure  Depression  Snoring      History of Present Illness     Patient presents today for follow-up visit regarding hypertension and chronic depression.  She has been on a calorie counting diet and has lost over 20 pounds.  She reports that she feels much better.  Her blood pressure has been lower and she has had to cut back some on her blood pressure medication.  She is also asking about decreasing her fluoxetine.  We tried this once previously and it did not go very well.  She feels that she is in a better place mentally and emotionally at the present time.  We discussed weaning quite slowly i.e. decreased by 10 mg every 2 weeks.  Patient also reports that her significant other has noted excessive snoring and possible apnea.  The patient has long-standing symptoms of excessive daytime somnolence.  She is concerned she may need to be tested for sleep apnea.      The following portions of the patient's history were reviewed and updated as appropriate: allergies, current medications, past social history and problem list    Review of Systems   Constitutional: Negative for appetite change, fatigue and unexpected weight change.   HENT: Negative for trouble swallowing and voice change.    Respiratory: Negative for cough, chest tightness and shortness of breath.    Cardiovascular: Negative for chest pain, palpitations and leg swelling.   Gastrointestinal: Negative for abdominal pain, diarrhea and nausea.   Musculoskeletal: Negative for arthralgias and myalgias.   Skin: Negative for color change and rash.   Neurological: Negative for dizziness, tremors, syncope, weakness, light-headedness and headaches.   Hematological: Negative for adenopathy. Does not bruise/bleed easily.   Psychiatric/Behavioral: Positive for dysphoric mood and sleep disturbance. Negative for agitation, behavioral problems, confusion, decreased concentration and  suicidal ideas. The patient is nervous/anxious.        Objective     Vitals:    11/15/18 0829   BP: 100/60   Pulse: 105   Temp: 98 °F (36.7 °C)   SpO2: 99%       Physical Exam   Constitutional: She is oriented to person, place, and time. She appears well-developed and well-nourished.   HENT:   Head: Normocephalic and atraumatic.   Eyes: Conjunctivae are normal. Pupils are equal, round, and reactive to light.   Neck: No JVD present.   Cardiovascular: Normal rate, regular rhythm, normal heart sounds, intact distal pulses and normal pulses.   No murmur heard.  Pulmonary/Chest: Effort normal and breath sounds normal. No respiratory distress.   Abdominal: Soft. Bowel sounds are normal. There is no hepatosplenomegaly. There is no tenderness.   Musculoskeletal: She exhibits no edema or deformity.   Neurological: She is alert and oriented to person, place, and time.   Skin: Skin is warm and dry.   Psychiatric: She has a normal mood and affect. Her behavior is normal.   Nursing note and vitals reviewed.      Assessment/Plan   Problem List Items Addressed This Visit        Cardiovascular and Mediastinum    HTN (hypertension) - Primary    Relevant Medications    triamterene-hydrochlorothiazide (MAXZIDE-25) 37.5-25 MG per tablet       Other    Anxiety, generalized    Relevant Medications    FLUoxetine (PROzac) 20 MG capsule      Other Visit Diagnoses     Snoring        Relevant Orders    Ambulatory Referral to Sleep Medicine    Daytime somnolence        Relevant Orders    Ambulatory Referral to Sleep Medicine    Depression, unspecified depression type        Relevant Medications    FLUoxetine (PROzac) 20 MG capsule

## 2018-11-23 ENCOUNTER — APPOINTMENT (OUTPATIENT)
Dept: MAMMOGRAPHY | Facility: HOSPITAL | Age: 48
End: 2018-11-23
Attending: OBSTETRICS & GYNECOLOGY

## 2018-12-13 ENCOUNTER — OFFICE VISIT (OUTPATIENT)
Dept: NEUROLOGY | Facility: CLINIC | Age: 48
End: 2018-12-13

## 2018-12-13 VITALS
OXYGEN SATURATION: 99 % | BODY MASS INDEX: 30.73 KG/M2 | WEIGHT: 180 LBS | DIASTOLIC BLOOD PRESSURE: 74 MMHG | HEIGHT: 64 IN | SYSTOLIC BLOOD PRESSURE: 102 MMHG | HEART RATE: 89 BPM

## 2018-12-13 DIAGNOSIS — G47.33 OBSTRUCTIVE SLEEP APNEA: Primary | ICD-10-CM

## 2018-12-13 PROCEDURE — 99244 OFF/OP CNSLTJ NEW/EST MOD 40: CPT | Performed by: PSYCHIATRY & NEUROLOGY

## 2018-12-13 NOTE — PROGRESS NOTES
Subjective   Patient ID: Aniya Castro is a 48 y.o. female     Chief Complaint   Patient presents with   • Sleep Apnea        History of Present Illness    48 y.o. female referred by Dr Duggan for ROLF.  Sleeping partner notes she is a loud snorer, witnessed apnea and excessive daytimes sleepiness.    Pulse oximetry overnight suggested ROLF.    Goes to bed at 10 pm awakens at 5 am.  Denies napping.  Excessive sleepiness when sitting quietly.  Falls asleep in 2 minutes.  Does not frequently awaken.  Denies RLS or leg kicks.      Sleep improved with 20 lb wt loss starting in July.        Reviewed medical records:     Pt reports loud snoring, witnessed apnea and EDS.  Pt has lost 20 lbs.  BP improving.      Labs - A1C 6.2, ,   Past Medical History:   Diagnosis Date   • Anxiety, generalized    • Fatigue    • HTN (hypertension)    • Organic mood disorder of depressed type    • Skin lesion    • Weight gain      Family History   Problem Relation Age of Onset   • Hypertension Mother    • Heart disease Father    • Stroke Other    • Arthritis Other    • Diabetes Other      Social History     Socioeconomic History   • Marital status:      Spouse name: Not on file   • Number of children: Not on file   • Years of education: Not on file   • Highest education level: Not on file   Tobacco Use   • Smoking status: Never Smoker   • Smokeless tobacco: Never Used   Substance and Sexual Activity   • Alcohol use: No     Comment: occcasional   • Drug use: No   • Sexual activity: No       Review of Systems   Constitutional: Positive for fatigue. Negative for activity change and unexpected weight change.   HENT: Negative for tinnitus and trouble swallowing.    Eyes: Negative for photophobia and visual disturbance.   Respiratory: Negative for apnea, cough and choking.    Cardiovascular: Negative for leg swelling.   Gastrointestinal: Negative for nausea and vomiting.   Endocrine: Negative for cold intolerance and heat  "intolerance.   Genitourinary: Negative for difficulty urinating, frequency, menstrual problem and urgency.   Musculoskeletal: Negative for back pain, gait problem, myalgias and neck pain.   Skin: Negative for color change and rash.   Allergic/Immunologic: Negative for immunocompromised state.   Neurological: Negative for dizziness, tremors, seizures, syncope, facial asymmetry, speech difficulty, weakness, light-headedness, numbness and headaches.   Hematological: Negative for adenopathy. Does not bruise/bleed easily.   Psychiatric/Behavioral: Positive for sleep disturbance. Negative for behavioral problems, confusion, decreased concentration and hallucinations.       Objective     Vitals:    12/13/18 0856   BP: 102/74   BP Location: Right arm   Patient Position: Sitting   Cuff Size: Adult   Pulse: 89   SpO2: 99%   Weight: 81.6 kg (180 lb)   Height: 162.6 cm (64.02\")       Neurologic Exam     Mental Status   Oriented to person, place, and time.   Registration: recalls 3 of 3 objects. Recall at 5 minutes: recalls 3 of 3 objects. Follows 3 step commands.   Attention: normal. Concentration: normal.   Speech: speech is normal   Level of consciousness: alert  Knowledge: good and consistent with education.   Able to name object. Able to read. Able to repeat. Able to write. Normal comprehension.     Cranial Nerves     CN II   Visual fields full to confrontation.   Visual acuity: normal  Right visual field deficit: none  Left visual field deficit: none     CN III, IV, VI   Pupils are equal, round, and reactive to light.  Extraocular motions are normal.   Right pupil: Shape: regular. Reactivity: brisk. Consensual response: intact.   Left pupil: Shape: regular. Reactivity: brisk. Consensual response: intact.   Nystagmus: none   Diplopia: none  Ophthalmoparesis: none  Upgaze: normal  Downgaze: normal  Conjugate gaze: present  Vestibulo-ocular reflex: present    CN V   Facial sensation intact.   Right corneal reflex: " normal  Left corneal reflex: normal    CN VII   Right facial weakness: none  Left facial weakness: none    CN VIII   Hearing: intact    CN IX, X   Palate: symmetric  Right gag reflex: normal  Left gag reflex: normal    CN XI   Right sternocleidomastoid strength: normal  Left sternocleidomastoid strength: normal    CN XII   Tongue: not atrophic  Fasciculations: absent  Tongue deviation: none    Motor Exam   Muscle bulk: normal  Overall muscle tone: normal  Right arm tone: normal  Left arm tone: normal  Right leg tone: normal  Left leg tone: normal    Strength   Strength 5/5 throughout.     Sensory Exam   Light touch normal.   Vibration normal.   Proprioception normal.   Pinprick normal.     Gait, Coordination, and Reflexes     Gait  Gait: normal    Coordination   Romberg: negative  Finger to nose coordination: normal  Heel to shin coordination: normal  Tandem walking coordination: normal    Tremor   Resting tremor: absent  Intention tremor: absent  Action tremor: absent    Reflexes   Reflexes 2+ except as noted.       Physical Exam   Constitutional: She is oriented to person, place, and time. Vital signs are normal. She appears well-developed and well-nourished.   HENT:   Head: Normocephalic and atraumatic.   Eyes: EOM and lids are normal. Pupils are equal, round, and reactive to light.   Fundoscopic exam:       The right eye shows no exudate, no hemorrhage and no papilledema. The right eye shows venous pulsations.        The left eye shows no exudate, no hemorrhage and no papilledema. The left eye shows venous pulsations.   Neck: Normal range of motion and phonation normal. Normal carotid pulses present. Carotid bruit is not present. No thyroid mass and no thyromegaly present.   Cardiovascular: Normal rate, regular rhythm and normal heart sounds.   Pulmonary/Chest: Effort normal.   Neurological: She is oriented to person, place, and time. She has normal strength. She has a normal Finger-Nose-Finger Test, a normal  Heel to Dimas Test, a normal Romberg Test and a normal Tandem Gait Test. Gait normal.   Skin: Skin is warm and dry.   Psychiatric: She has a normal mood and affect. Her speech is normal.   Nursing note and vitals reviewed.      Office Visit on 03/20/2018   Component Date Value Ref Range Status   • Total Cholesterol 03/22/2018 200  0 - 200 mg/dL Final   • Triglycerides 03/22/2018 132  0 - 150 mg/dL Final   • HDL Cholesterol 03/22/2018 45  40 - 60 mg/dL Final   • LDL Cholesterol  03/22/2018 158* 0 - 130 mg/dL Final   • Hemoglobin A1C 03/22/2018 6.20* 4.80 - 5.60 % Final         Assessment/Plan     Problem List Items Addressed This Visit        Respiratory    Obstructive sleep apnea - Primary    Current Assessment & Plan     Signs and symptoms of ROLF    Schedule home sleep study          Relevant Orders    Home Sleep Study             No Follow-up on file.

## 2019-01-03 ENCOUNTER — HOSPITAL ENCOUNTER (OUTPATIENT)
Dept: SLEEP MEDICINE | Facility: HOSPITAL | Age: 49
Discharge: HOME | End: 2019-01-03
Attending: PSYCHIATRY & NEUROLOGY | Admitting: PSYCHIATRY & NEUROLOGY

## 2019-01-03 VITALS
OXYGEN SATURATION: 96 % | WEIGHT: 185.4 LBS | HEART RATE: 82 BPM | SYSTOLIC BLOOD PRESSURE: 142 MMHG | DIASTOLIC BLOOD PRESSURE: 83 MMHG | HEIGHT: 64 IN | BODY MASS INDEX: 31.65 KG/M2

## 2019-01-03 DIAGNOSIS — G47.33 OBSTRUCTIVE SLEEP APNEA: ICD-10-CM

## 2019-01-03 PROCEDURE — 95800 SLP STDY UNATTENDED: CPT

## 2019-01-03 PROCEDURE — 95800 SLP STDY UNATTENDED: CPT | Performed by: PSYCHIATRY & NEUROLOGY

## 2019-01-17 ENCOUNTER — OFFICE VISIT (OUTPATIENT)
Dept: NEUROLOGY | Facility: CLINIC | Age: 49
End: 2019-01-17

## 2019-01-17 VITALS
HEART RATE: 80 BPM | OXYGEN SATURATION: 98 % | DIASTOLIC BLOOD PRESSURE: 70 MMHG | RESPIRATION RATE: 18 BRPM | BODY MASS INDEX: 31.58 KG/M2 | SYSTOLIC BLOOD PRESSURE: 118 MMHG | WEIGHT: 184 LBS

## 2019-01-17 DIAGNOSIS — G47.33 OBSTRUCTIVE SLEEP APNEA: Primary | ICD-10-CM

## 2019-01-17 PROCEDURE — 99212 OFFICE O/P EST SF 10 MIN: CPT | Performed by: PSYCHIATRY & NEUROLOGY

## 2019-01-17 NOTE — PROGRESS NOTES
Subjective   Patient ID: Aniya Castro is a 48 y.o. female     Chief Complaint   Patient presents with   • Follow-up        History of Present Illness    48 y.o. female returns for follow up of ROLF.  Last visit on 12/13/18 ordered Home3 sleep study.      Home sleep study - AHI 39.8     Continued loud snoring, witnessed apnea, excessive daytime sleepiness.  Does have large tonsils.      Problem history:    Pulse oximetry overnight suggested ROLF.    Goes to bed at 10 pm awakens at 5 am.  Denies napping.  Excessive sleepiness when sitting quietly.  Falls asleep in 2 minutes.  Does not frequently awaken.  Denies RLS or leg kicks.      Sleep improved with 20 lb wt loss starting in July.       Reviewed medical records:     Pt reports loud snoring, witnessed apnea and EDS.  Pt has lost 20 lbs.  BP improving.      Labs - A1C 6.2, ,     Past Medical History:   Diagnosis Date   • Anxiety, generalized    • Fatigue    • HTN (hypertension)    • Organic mood disorder of depressed type    • Skin lesion    • Weight gain      Family History   Problem Relation Age of Onset   • Hypertension Mother    • Heart disease Father    • Stroke Other    • Arthritis Other    • Diabetes Other      Social History     Socioeconomic History   • Marital status:      Spouse name: Not on file   • Number of children: Not on file   • Years of education: Not on file   • Highest education level: Not on file   Tobacco Use   • Smoking status: Never Smoker   • Smokeless tobacco: Never Used   Substance and Sexual Activity   • Alcohol use: No     Comment: occcasional   • Drug use: No   • Sexual activity: No       Review of Systems   Constitutional: Positive for fatigue. Negative for activity change and unexpected weight change.   HENT: Negative for tinnitus and trouble swallowing.    Eyes: Negative for photophobia and visual disturbance.   Respiratory: Negative for apnea, cough and choking.    Cardiovascular: Negative for leg swelling.    Gastrointestinal: Negative for nausea and vomiting.   Endocrine: Negative for cold intolerance and heat intolerance.   Genitourinary: Negative for difficulty urinating, frequency, menstrual problem and urgency.   Musculoskeletal: Negative for back pain, gait problem, myalgias and neck pain.   Skin: Negative for color change and rash.   Allergic/Immunologic: Negative for immunocompromised state.   Neurological: Negative for dizziness, tremors, seizures, syncope, facial asymmetry, speech difficulty, weakness, light-headedness, numbness and headaches.   Hematological: Negative for adenopathy. Does not bruise/bleed easily.   Psychiatric/Behavioral: Positive for sleep disturbance. Negative for behavioral problems, confusion, decreased concentration and hallucinations.       Objective     Vitals:    01/17/19 1514   BP: 118/70   BP Location: Right arm   Patient Position: Sitting   Cuff Size: Adult   Pulse: 80   Resp: 18   SpO2: 98%   Weight: 83.5 kg (184 lb)       Neurologic Exam     Mental Status   Registration: recalls 3 of 3 objects. Recall at 5 minutes: recalls 3 of 3 objects. Follows 3 step commands.   Attention: normal. Concentration: normal.   Level of consciousness: alert  Knowledge: good and consistent with education.   Able to name object. Able to read. Able to repeat. Able to write. Normal comprehension.     Cranial Nerves     CN II   Visual fields full to confrontation.   Visual acuity: normal  Right visual field deficit: none  Left visual field deficit: none     CN III, IV, VI   Right pupil: Shape: regular. Reactivity: brisk. Consensual response: intact.   Left pupil: Shape: regular. Reactivity: brisk. Consensual response: intact.   Nystagmus: none   Diplopia: none  Ophthalmoparesis: none  Upgaze: normal  Downgaze: normal  Conjugate gaze: present  Vestibulo-ocular reflex: present    CN V   Facial sensation intact.   Right corneal reflex: normal  Left corneal reflex: normal    CN VII   Right facial weakness:  none  Left facial weakness: none    CN VIII   Hearing: intact    CN IX, X   Palate: symmetric  Right gag reflex: normal  Left gag reflex: normal    CN XI   Right sternocleidomastoid strength: normal  Left sternocleidomastoid strength: normal    CN XII   Tongue: not atrophic  Fasciculations: absent  Tongue deviation: none    Motor Exam   Muscle bulk: normal  Overall muscle tone: normal  Right arm tone: normal  Left arm tone: normal  Right leg tone: normal  Left leg tone: normal    Sensory Exam   Light touch normal.   Vibration normal.   Proprioception normal.   Pinprick normal.     Gait, Coordination, and Reflexes     Tremor   Resting tremor: absent  Intention tremor: absent  Action tremor: absent    Reflexes   Reflexes 2+ except as noted.       Physical Exam   Constitutional: She appears well-developed and well-nourished.   Nursing note and vitals reviewed.      Office Visit on 03/20/2018   Component Date Value Ref Range Status   • Total Cholesterol 03/22/2018 200  0 - 200 mg/dL Final   • Triglycerides 03/22/2018 132  0 - 150 mg/dL Final   • HDL Cholesterol 03/22/2018 45  40 - 60 mg/dL Final   • LDL Cholesterol  03/22/2018 158* 0 - 130 mg/dL Final   • Hemoglobin A1C 03/22/2018 6.20* 4.80 - 5.60 % Final         Assessment/Plan     Problem List Items Addressed This Visit        Respiratory    Obstructive sleep apnea - Primary    Current Assessment & Plan     Signs and Sx consistent with ROLF     Start autopap 8 - 18 cm H20                   No Follow-up on file.

## 2019-02-06 ENCOUNTER — HOSPITAL ENCOUNTER (OUTPATIENT)
Dept: MAMMOGRAPHY | Facility: HOSPITAL | Age: 49
Discharge: HOME | End: 2019-02-06
Attending: OBSTETRICS & GYNECOLOGY | Admitting: OBSTETRICS & GYNECOLOGY

## 2019-02-06 DIAGNOSIS — Z12.31 VISIT FOR SCREENING MAMMOGRAM: ICD-10-CM

## 2019-02-06 PROCEDURE — 77063 BREAST TOMOSYNTHESIS BI: CPT | Performed by: RADIOLOGY

## 2019-02-06 PROCEDURE — 77067 SCR MAMMO BI INCL CAD: CPT

## 2019-02-06 PROCEDURE — 77067 SCR MAMMO BI INCL CAD: CPT | Performed by: RADIOLOGY

## 2019-02-06 PROCEDURE — 77063 BREAST TOMOSYNTHESIS BI: CPT

## 2019-02-14 ENCOUNTER — HOSPITAL ENCOUNTER (OUTPATIENT)
Dept: ULTRASOUND IMAGING | Facility: HOSPITAL | Age: 49
Discharge: HOME | End: 2019-02-14

## 2019-02-14 ENCOUNTER — HOSPITAL ENCOUNTER (OUTPATIENT)
Dept: MAMMOGRAPHY | Facility: HOSPITAL | Age: 49
Discharge: HOME | End: 2019-02-14
Admitting: RADIOLOGY

## 2019-02-14 DIAGNOSIS — R92.8 ABNORMAL MAMMOGRAM: ICD-10-CM

## 2019-02-14 PROCEDURE — 76642 ULTRASOUND BREAST LIMITED: CPT

## 2019-02-14 PROCEDURE — 77061 BREAST TOMOSYNTHESIS UNI: CPT | Performed by: RADIOLOGY

## 2019-02-14 PROCEDURE — G0279 TOMOSYNTHESIS, MAMMO: HCPCS

## 2019-02-14 PROCEDURE — 77065 DX MAMMO INCL CAD UNI: CPT

## 2019-02-14 PROCEDURE — 76642 ULTRASOUND BREAST LIMITED: CPT | Performed by: RADIOLOGY

## 2019-02-14 PROCEDURE — 77065 DX MAMMO INCL CAD UNI: CPT | Performed by: RADIOLOGY

## 2019-02-20 ENCOUNTER — OFFICE VISIT (OUTPATIENT)
Dept: FAMILY MEDICINE CLINIC | Facility: CLINIC | Age: 49
End: 2019-02-20

## 2019-02-20 ENCOUNTER — HOSPITAL ENCOUNTER (OUTPATIENT)
Dept: ULTRASOUND IMAGING | Facility: HOSPITAL | Age: 49
Discharge: HOME | End: 2019-02-20
Admitting: PHYSICIAN ASSISTANT

## 2019-02-20 ENCOUNTER — LAB (OUTPATIENT)
Dept: LAB | Facility: HOSPITAL | Age: 49
End: 2019-02-20

## 2019-02-20 VITALS
SYSTOLIC BLOOD PRESSURE: 136 MMHG | HEIGHT: 64 IN | WEIGHT: 186 LBS | TEMPERATURE: 98.4 F | HEART RATE: 86 BPM | OXYGEN SATURATION: 99 % | DIASTOLIC BLOOD PRESSURE: 78 MMHG | BODY MASS INDEX: 31.76 KG/M2

## 2019-02-20 DIAGNOSIS — R10.11 RIGHT UPPER QUADRANT ABDOMINAL PAIN: Primary | ICD-10-CM

## 2019-02-20 DIAGNOSIS — K21.9 GASTROESOPHAGEAL REFLUX DISEASE, ESOPHAGITIS PRESENCE NOT SPECIFIED: ICD-10-CM

## 2019-02-20 DIAGNOSIS — R10.11 RIGHT UPPER QUADRANT ABDOMINAL PAIN: ICD-10-CM

## 2019-02-20 LAB
ALBUMIN SERPL-MCNC: 4.17 G/DL (ref 3.2–4.8)
ALBUMIN/GLOB SERPL: 2.1 G/DL (ref 1.5–2.5)
ALP SERPL-CCNC: 95 U/L (ref 25–100)
ALT SERPL W P-5'-P-CCNC: 347 U/L (ref 7–40)
ANION GAP SERPL CALCULATED.3IONS-SCNC: 5 MMOL/L (ref 3–11)
AST SERPL-CCNC: 304 U/L (ref 0–33)
BASOPHILS # BLD AUTO: 0.03 10*3/MM3 (ref 0–0.2)
BASOPHILS NFR BLD AUTO: 0.4 % (ref 0–1)
BILIRUB SERPL-MCNC: 2.2 MG/DL (ref 0.3–1.2)
BUN BLD-MCNC: 13 MG/DL (ref 9–23)
BUN/CREAT SERPL: 17.3 (ref 7–25)
CALCIUM SPEC-SCNC: 9.3 MG/DL (ref 8.7–10.4)
CHLORIDE SERPL-SCNC: 107 MMOL/L (ref 99–109)
CO2 SERPL-SCNC: 27 MMOL/L (ref 20–31)
CREAT BLD-MCNC: 0.75 MG/DL (ref 0.6–1.3)
DEPRECATED RDW RBC AUTO: 44.8 FL (ref 37–54)
EOSINOPHIL # BLD AUTO: 0.15 10*3/MM3 (ref 0–0.3)
EOSINOPHIL NFR BLD AUTO: 1.8 % (ref 0–3)
ERYTHROCYTE [DISTWIDTH] IN BLOOD BY AUTOMATED COUNT: 13.7 % (ref 11.3–14.5)
GFR SERPL CREATININE-BSD FRML MDRD: 83 ML/MIN/1.73
GLOBULIN UR ELPH-MCNC: 2 GM/DL
GLUCOSE BLD-MCNC: 80 MG/DL (ref 70–100)
HCT VFR BLD AUTO: 40.1 % (ref 34.5–44)
HGB BLD-MCNC: 13.2 G/DL (ref 11.5–15.5)
IMM GRANULOCYTES # BLD AUTO: 0.02 10*3/MM3 (ref 0–0.05)
IMM GRANULOCYTES NFR BLD AUTO: 0.2 % (ref 0–0.6)
LYMPHOCYTES # BLD AUTO: 1.85 10*3/MM3 (ref 0.6–4.8)
LYMPHOCYTES NFR BLD AUTO: 22.4 % (ref 24–44)
MCH RBC QN AUTO: 29.4 PG (ref 27–31)
MCHC RBC AUTO-ENTMCNC: 32.9 G/DL (ref 32–36)
MCV RBC AUTO: 89.3 FL (ref 80–99)
MONOCYTES # BLD AUTO: 0.88 10*3/MM3 (ref 0–1)
MONOCYTES NFR BLD AUTO: 10.7 % (ref 0–12)
NEUTROPHILS # BLD AUTO: 5.35 10*3/MM3 (ref 1.5–8.3)
NEUTROPHILS NFR BLD AUTO: 64.7 % (ref 41–71)
PLATELET # BLD AUTO: 397 10*3/MM3 (ref 150–450)
PMV BLD AUTO: 11.5 FL (ref 6–12)
POTASSIUM BLD-SCNC: 3.9 MMOL/L (ref 3.5–5.5)
PROT SERPL-MCNC: 6.2 G/DL (ref 5.7–8.2)
RBC # BLD AUTO: 4.49 10*6/MM3 (ref 3.89–5.14)
SODIUM BLD-SCNC: 139 MMOL/L (ref 132–146)
WBC NRBC COR # BLD: 8.26 10*3/MM3 (ref 3.5–10.8)

## 2019-02-20 PROCEDURE — 76705 ECHO EXAM OF ABDOMEN: CPT

## 2019-02-20 PROCEDURE — 99213 OFFICE O/P EST LOW 20 MIN: CPT | Performed by: PHYSICIAN ASSISTANT

## 2019-02-20 PROCEDURE — 36415 COLL VENOUS BLD VENIPUNCTURE: CPT

## 2019-02-20 PROCEDURE — 80074 ACUTE HEPATITIS PANEL: CPT | Performed by: PHYSICIAN ASSISTANT

## 2019-02-20 PROCEDURE — 85025 COMPLETE CBC W/AUTO DIFF WBC: CPT

## 2019-02-20 PROCEDURE — 80053 COMPREHEN METABOLIC PANEL: CPT

## 2019-02-20 RX ORDER — LANSOPRAZOLE 30 MG/1
30 CAPSULE, DELAYED RELEASE ORAL DAILY
COMMUNITY
End: 2019-03-07 | Stop reason: HOSPADM

## 2019-02-20 RX ORDER — SUCRALFATE 1 G/1
1 TABLET ORAL 4 TIMES DAILY
Qty: 40 TABLET | Refills: 0 | Status: SHIPPED | OUTPATIENT
Start: 2019-02-20 | End: 2019-03-04

## 2019-02-20 NOTE — PROGRESS NOTES
Subjective   Aniya Castro is a 48 y.o. female  Abdominal Pain (upper right quadrant pain since last night )      History of Present Illness  Patient comes in today for evaluation of severe right upper quadrant abdominal pain is been ongoing since chest the evening.  She states she's been somewhat nauseated but no vomiting, the pain Rub +.  She states she's had symptoms of pain in this area on and off for the past year but became severe yesterday after eating spaghetti.  Bowel movements are normal no blood in bowels, she is on Prevacid currently for chronic GERD which has been stable.  The following portions of the patient's history were reviewed and updated as appropriate: allergies, current medications, past social history and problem list    Review of Systems   Constitutional: Positive for appetite change. Negative for chills, fever and unexpected weight change.   Respiratory: Negative.  Negative for cough, chest tightness and shortness of breath.    Cardiovascular: Negative for chest pain.   Gastrointestinal: Positive for abdominal distention and abdominal pain. Negative for blood in stool, constipation, diarrhea, nausea and vomiting.   Genitourinary: Negative.  Negative for difficulty urinating and dysuria.   Musculoskeletal: Positive for back pain.   Skin: Negative for color change and rash.   Allergic/Immunologic: Negative for food allergies.   Psychiatric/Behavioral: Positive for sleep disturbance.       Objective     Vitals:    02/20/19 1025   BP: 136/78   Pulse: 86   Temp: 98.4 °F (36.9 °C)   SpO2: 99%       Physical Exam   Constitutional: She is oriented to person, place, and time. She appears well-developed and well-nourished.  Non-toxic appearance. She does not have a sickly appearance. She appears ill. She appears distressed ( Patient appears to be uncomfortable holding her abdomen right upper quadrant).   Eyes: Conjunctivae are normal. No scleral icterus.   Cardiovascular: Normal rate, regular  rhythm and normal heart sounds.   Pulmonary/Chest: Effort normal and breath sounds normal. No respiratory distress.   Abdominal: Soft. Bowel sounds are normal. She exhibits no distension and no mass. There is tenderness ( Marked tenderness right upper quadrant abdomen). There is no rebound and no guarding. No hernia.   Neurological: She is alert and oriented to person, place, and time.   Skin: Skin is warm and dry. No rash noted. She is not diaphoretic. No erythema. No pallor.   Psychiatric: She has a normal mood and affect. Her behavior is normal.   Nursing note and vitals reviewed.      Assessment/Plan     Diagnoses and all orders for this visit:    Right upper quadrant abdominal pain  -     Comprehensive Metabolic Panel; Future  -     CBC & Differential; Future  -     US Gallbladder    Gastroesophageal reflux disease, esophagitis presence not specified    Other orders  -     lansoprazole (PREVACID) 30 MG capsule; Take 30 mg by mouth Daily.  -     sucralfate (CARAFATE) 1 g tablet; Take 1 tablet by mouth 4 (Four) Times a Day. For stomach pain

## 2019-02-22 ENCOUNTER — LAB (OUTPATIENT)
Dept: LAB | Facility: HOSPITAL | Age: 49
End: 2019-02-22

## 2019-02-22 ENCOUNTER — TELEPHONE (OUTPATIENT)
Dept: FAMILY MEDICINE CLINIC | Facility: CLINIC | Age: 49
End: 2019-02-22

## 2019-02-22 DIAGNOSIS — K80.71 CALCULUS OF GALLBLADDER AND BILE DUCT WITH OBSTRUCTION WITHOUT CHOLECYSTITIS: ICD-10-CM

## 2019-02-22 DIAGNOSIS — K80.71 CALCULUS OF GALLBLADDER AND BILE DUCT WITH OBSTRUCTION WITHOUT CHOLECYSTITIS: Primary | ICD-10-CM

## 2019-02-22 LAB
ALBUMIN SERPL-MCNC: 4.53 G/DL (ref 3.2–4.8)
ALBUMIN/GLOB SERPL: 2.1 G/DL (ref 1.5–2.5)
ALP SERPL-CCNC: 119 U/L (ref 25–100)
ALT SERPL W P-5'-P-CCNC: 221 U/L (ref 7–40)
AMYLASE SERPL-CCNC: 57 U/L (ref 30–118)
ANION GAP SERPL CALCULATED.3IONS-SCNC: 9 MMOL/L (ref 3–11)
AST SERPL-CCNC: 45 U/L (ref 0–33)
BASOPHILS # BLD AUTO: 0.04 10*3/MM3 (ref 0–0.2)
BASOPHILS NFR BLD AUTO: 0.5 % (ref 0–1)
BILIRUB SERPL-MCNC: 0.6 MG/DL (ref 0.3–1.2)
BUN BLD-MCNC: 11 MG/DL (ref 9–23)
BUN/CREAT SERPL: 12.1 (ref 7–25)
CALCIUM SPEC-SCNC: 9.3 MG/DL (ref 8.7–10.4)
CHLORIDE SERPL-SCNC: 104 MMOL/L (ref 99–109)
CO2 SERPL-SCNC: 27 MMOL/L (ref 20–31)
CREAT BLD-MCNC: 0.91 MG/DL (ref 0.6–1.3)
DEPRECATED RDW RBC AUTO: 46.1 FL (ref 37–54)
EOSINOPHIL # BLD AUTO: 0.39 10*3/MM3 (ref 0–0.3)
EOSINOPHIL NFR BLD AUTO: 4.5 % (ref 0–3)
ERYTHROCYTE [DISTWIDTH] IN BLOOD BY AUTOMATED COUNT: 14 % (ref 11.3–14.5)
GFR SERPL CREATININE-BSD FRML MDRD: 66 ML/MIN/1.73
GLOBULIN UR ELPH-MCNC: 2.2 GM/DL
GLUCOSE BLD-MCNC: 103 MG/DL (ref 70–100)
HAV IGM SERPL QL IA: NORMAL
HBV CORE IGM SERPL QL IA: NORMAL
HBV SURFACE AG SERPL QL IA: NORMAL
HCT VFR BLD AUTO: 40.5 % (ref 34.5–44)
HCV AB SER DONR QL: NORMAL
HGB BLD-MCNC: 13.2 G/DL (ref 11.5–15.5)
IMM GRANULOCYTES # BLD AUTO: 0.01 10*3/MM3 (ref 0–0.05)
IMM GRANULOCYTES NFR BLD AUTO: 0.1 % (ref 0–0.6)
LIPASE SERPL-CCNC: 72 U/L (ref 6–51)
LYMPHOCYTES # BLD AUTO: 1.97 10*3/MM3 (ref 0.6–4.8)
LYMPHOCYTES NFR BLD AUTO: 22.8 % (ref 24–44)
MCH RBC QN AUTO: 29.3 PG (ref 27–31)
MCHC RBC AUTO-ENTMCNC: 32.6 G/DL (ref 32–36)
MCV RBC AUTO: 90 FL (ref 80–99)
MONOCYTES # BLD AUTO: 0.85 10*3/MM3 (ref 0–1)
MONOCYTES NFR BLD AUTO: 9.8 % (ref 0–12)
NEUTROPHILS # BLD AUTO: 5.4 10*3/MM3 (ref 1.5–8.3)
NEUTROPHILS NFR BLD AUTO: 62.4 % (ref 41–71)
PLATELET # BLD AUTO: 383 10*3/MM3 (ref 150–450)
PMV BLD AUTO: 10.8 FL (ref 6–12)
POTASSIUM BLD-SCNC: 3.6 MMOL/L (ref 3.5–5.5)
PROT SERPL-MCNC: 6.7 G/DL (ref 5.7–8.2)
RBC # BLD AUTO: 4.5 10*6/MM3 (ref 3.89–5.14)
SODIUM BLD-SCNC: 140 MMOL/L (ref 132–146)
WBC NRBC COR # BLD: 8.65 10*3/MM3 (ref 3.5–10.8)

## 2019-02-22 PROCEDURE — 83690 ASSAY OF LIPASE: CPT

## 2019-02-22 PROCEDURE — 36415 COLL VENOUS BLD VENIPUNCTURE: CPT

## 2019-02-22 PROCEDURE — 80053 COMPREHEN METABOLIC PANEL: CPT

## 2019-02-22 PROCEDURE — 85025 COMPLETE CBC W/AUTO DIFF WBC: CPT

## 2019-02-22 PROCEDURE — 82150 ASSAY OF AMYLASE: CPT

## 2019-02-22 NOTE — TELEPHONE ENCOUNTER
----- Message from Meka Garcia sent at 2/22/2019 10:54 AM EST -----  Contact: PT  ANXIOUSLY AWAITING INFO ON GALLBLADDER U/S, TECH TOLD HER WHEN SHE HAD IT 2/20 TO BE PREPARED FOR HAVING SURGERY BUT SHE HASN'T HEARD ANYTHING FROM OUR OFFICE.    PLEASE CALL PT AT CELL OR WORK #S

## 2019-02-22 NOTE — TELEPHONE ENCOUNTER
I spoke with patient today regarding her result after talking to Dr. Nicole Caro and Dr. Mullins.  Patient will need to be seen both by gastroenterology and general surgery on Monday and was instructed to go to the emergency department if pain worsens through the weekend.  She will also be going to [facility] today and having liver enzymes repeated along with CBC and hepatitis panel.  I discussed this with her in detail.

## 2019-02-25 ENCOUNTER — OFFICE VISIT (OUTPATIENT)
Dept: GASTROENTEROLOGY | Facility: CLINIC | Age: 49
End: 2019-02-25

## 2019-02-25 VITALS
SYSTOLIC BLOOD PRESSURE: 132 MMHG | HEIGHT: 64 IN | TEMPERATURE: 98.3 F | DIASTOLIC BLOOD PRESSURE: 88 MMHG | HEART RATE: 71 BPM | WEIGHT: 189 LBS | OXYGEN SATURATION: 99 % | BODY MASS INDEX: 32.27 KG/M2 | RESPIRATION RATE: 18 BRPM

## 2019-02-25 DIAGNOSIS — K80.50 CHOLEDOCHOLITHIASIS: Primary | ICD-10-CM

## 2019-02-25 PROCEDURE — 99204 OFFICE O/P NEW MOD 45 MIN: CPT | Performed by: INTERNAL MEDICINE

## 2019-02-25 NOTE — PROGRESS NOTES
PCP: Jose Manuel Duggan MD    Chief Complaint   Patient presents with   • ERCP CONSULT     NEW PATIENT        History of Present Illness:   Aniya Castro is a 48 y.o. female who presents to the GI clinic for ? Choledocholithiasis.  Has been experiencing chronic ruq and back pain for years. Typically experience ruq pain with radiation into the back around once a month. However, approximately 6 days ago she experienced severe ruq sharp achy and radiating into the back pain that lasted from 6 pm to 4 am. She was seen by her pcp who assessed blood work showing elevated liver enzymes with t bili 2.2. No fever. Repeat labs showed downgoing ast/alt and t bili to 0.6.  She feels well now without abdominal pain.    Past Medical History:   Diagnosis Date   • Anxiety, generalized    • Fatigue    • HTN (hypertension)    • Organic mood disorder of depressed type    • Skin lesion    • Weight gain        Past Surgical History:   Procedure Laterality Date   • ADENOIDECTOMY     •  SECTION           Current Outpatient Medications:   •  FLUoxetine (PROzac) 20 MG capsule, 1 or 2 caps daily, Disp: 60 capsule, Rfl: 5  •  hepatitis A vaccine (VAQTA) 50 UNIT/ML injection, Inject into the appropriate muscle as directed by prescriber. Repeat in 6 months., Disp: 1 mL, Rfl: 1  •  lansoprazole (PREVACID) 30 MG capsule, Take 30 mg by mouth Daily., Disp: , Rfl:   •  loratadine-pseudoephedrine (CLARITIN-D 24-hour)  MG per 24 hr tablet, Take 1 tablet by mouth Daily., Disp: 15 tablet, Rfl: 0  •  Multiple Vitamin (MULTI VITAMIN DAILY PO), Take  by mouth., Disp: , Rfl:   •  ondansetron (ZOFRAN) 8 MG tablet, Take 1 tablet by mouth Every 8 (Eight) Hours As Needed for Nausea or Vomiting., Disp: 15 tablet, Rfl: 0  •  sucralfate (CARAFATE) 1 g tablet, Take 1 tablet by mouth 4 (Four) Times a Day. For stomach pain, Disp: 40 tablet, Rfl: 0  •  triamterene-hydrochlorothiazide (MAXZIDE-25) 37.5-25 MG per tablet, Take 1 tablet by  mouth Daily., Disp: 30 tablet, Rfl: 5    Allergies   Allergen Reactions   • Sulfabenzamide Hives     generalized       Family History   Problem Relation Age of Onset   • Hypertension Mother    • Heart disease Father    • Stroke Other    • Arthritis Other    • Diabetes Other    • Breast cancer Neg Hx    • Ovarian cancer Neg Hx        Social History     Socioeconomic History   • Marital status:      Spouse name: Not on file   • Number of children: Not on file   • Years of education: Not on file   • Highest education level: Not on file   Social Needs   • Financial resource strain: Not on file   • Food insecurity - worry: Not on file   • Food insecurity - inability: Not on file   • Transportation needs - medical: Not on file   • Transportation needs - non-medical: Not on file   Occupational History   • Not on file   Tobacco Use   • Smoking status: Never Smoker   • Smokeless tobacco: Never Used   Substance and Sexual Activity   • Alcohol use: No     Comment: occcasional   • Drug use: No   • Sexual activity: No   Other Topics Concern   • Not on file   Social History Narrative   • Not on file       Review of Systems   Constitutional: Negative.  Negative for fever.   HENT: Negative.    Eyes: Negative.    Respiratory: Negative.    Cardiovascular: Negative.    Gastrointestinal: Positive for abdominal pain and nausea. Negative for constipation, diarrhea and vomiting.   Endocrine: Negative.    Genitourinary: Negative for dysuria, frequency and hematuria.   Musculoskeletal: Negative.  Negative for arthralgias and myalgias.   Skin: Negative.    Allergic/Immunologic: Negative.    Neurological: Negative.  Negative for headaches.   Hematological: Negative.    Psychiatric/Behavioral: Negative.      All other systems reviewed and are negative.    Vitals:    02/25/19 1430   BP: 132/88   Pulse: 71   Resp: 18   Temp: 98.3 °F (36.8 °C)   SpO2: 99%       Physical Exam  General Appearance:  Vitals as above. no acute distress  High  bmi  Head/face:  Normocephalic, atraumatic  Eyes:   EOMI, no conjunctivitis or icterus   Nose/Sinuses:  Nares patent bilaterally without discharge or lesions  Mouth/Throat:  Posterior pharynx is pink without drainage or exudates;  dentition is in good condition and repair  Neck:  trachea is midline, no thyromegaly  Lungs:  Clear to auscultation bilaterally  Heart:  Regular rate and rhythm without murmur, gallop or rub  Abdomen:  Soft, non-tender to palpation, no obvious masses, bowel sounds positive in four quadrants; no abdominal bruits; no guarding or rebound tenderness  Neurologic:  Alert; no focal deficits; age appropriate behavior and speech  Psychiatric: mood and affect are congruent  Vascular: extremities without edema  Skin: no rash or cyanosis.      Assessment/Plan  1.) abnormal lfts  2.) Abnormal finding on radiologic exam, dilated bile duct 12 mm without overt choledocholithiasis  3.) Cholelithiasis  4.) High bmi    Her repeat labs and improved pain are suspicious for either passed choledocholithiasis or oscillating stone within the duct.  She is to have an mrcp tomorrow and repeat blood work.  If picture equivocal, would recommend lap ccy with cholangiogram.  However, if choledocholithiasis is strongly suspected, would proceed with pre op ercp with lap ccy to follow. The timing will be TBD.    We discussed the risk of the procedure including the risk of pancreatitis (roughly 4/100 with 100 mg pr indocin for obstructive etiology), bowel perforation (~1/1000), hemorrhage (~1/200), infection (1~1-300) and death (severe complication of any of the above).  After discussing the benefit and risk, the patient and I determined to proceed with the procedure.       Todd Campos MD  2/25/2019  Answers for HPI/ROS submitted by the patient on 2/25/2019   Abdominal pain  Chronicity: recurrent  Onset: more than 1 year ago  Onset quality: sudden  Frequency: intermittently  Episode duration: 4 hours  Progression  since onset: gradually worsening  Pain location: RUQ  Pain - numeric: 8/10  Pain quality: aching, burning, cramping, a sensation of fullness, sharp  Radiates to: does not radiate  anorexia: No  belching: No  flatus: No  hematochezia: No  melena: No  weight loss: No  Aggravated by: certain positions, deep breathing, movement  Relieved by: nothing  Diagnostic workup: ultrasound

## 2019-02-26 ENCOUNTER — LAB (OUTPATIENT)
Dept: LAB | Facility: HOSPITAL | Age: 49
End: 2019-02-26

## 2019-02-26 ENCOUNTER — HOSPITAL ENCOUNTER (OUTPATIENT)
Dept: MRI IMAGING | Facility: HOSPITAL | Age: 49
Discharge: HOME | End: 2019-02-26
Admitting: INTERNAL MEDICINE

## 2019-02-26 DIAGNOSIS — K80.50 CHOLEDOCHOLITHIASIS: ICD-10-CM

## 2019-02-26 LAB
ALBUMIN SERPL-MCNC: 4.31 G/DL (ref 3.2–4.8)
ALBUMIN/GLOB SERPL: 2.2 G/DL (ref 1.5–2.5)
ALP SERPL-CCNC: 92 U/L (ref 25–100)
ALT SERPL W P-5'-P-CCNC: 63 U/L (ref 7–40)
ANION GAP SERPL CALCULATED.3IONS-SCNC: 8 MMOL/L (ref 3–11)
AST SERPL-CCNC: 16 U/L (ref 0–33)
BILIRUB SERPL-MCNC: 0.4 MG/DL (ref 0.3–1.2)
BUN BLD-MCNC: 9 MG/DL (ref 9–23)
BUN/CREAT SERPL: 11.7 (ref 7–25)
CALCIUM SPEC-SCNC: 9.1 MG/DL (ref 8.7–10.4)
CHLORIDE SERPL-SCNC: 106 MMOL/L (ref 99–109)
CO2 SERPL-SCNC: 25 MMOL/L (ref 20–31)
CREAT BLD-MCNC: 0.77 MG/DL (ref 0.6–1.3)
ERYTHROCYTE [DISTWIDTH] IN BLOOD BY AUTOMATED COUNT: 14.3 % (ref 11.3–14.5)
GFR SERPL CREATININE-BSD FRML MDRD: 80 ML/MIN/1.73
GLOBULIN UR ELPH-MCNC: 2 GM/DL
GLUCOSE BLD-MCNC: 81 MG/DL (ref 70–100)
HCT VFR BLD AUTO: 38.5 % (ref 34.5–44)
HGB BLD-MCNC: 12.9 G/DL (ref 11.5–15.5)
MCH RBC QN AUTO: 30 PG (ref 27–31)
MCHC RBC AUTO-ENTMCNC: 33.5 G/DL (ref 32–36)
MCV RBC AUTO: 89.6 FL (ref 80–99)
PLATELET # BLD AUTO: 313 10*3/MM3 (ref 150–450)
PMV BLD AUTO: 8.6 FL (ref 6–12)
POTASSIUM BLD-SCNC: 3.7 MMOL/L (ref 3.5–5.5)
PROT SERPL-MCNC: 6.3 G/DL (ref 5.7–8.2)
RBC # BLD AUTO: 4.3 10*6/MM3 (ref 3.89–5.14)
SODIUM BLD-SCNC: 139 MMOL/L (ref 132–146)
WBC NRBC COR # BLD: 6.8 10*3/MM3 (ref 3.5–10.8)

## 2019-02-26 PROCEDURE — 85027 COMPLETE CBC AUTOMATED: CPT

## 2019-02-26 PROCEDURE — 36415 COLL VENOUS BLD VENIPUNCTURE: CPT

## 2019-02-26 PROCEDURE — 80053 COMPREHEN METABOLIC PANEL: CPT

## 2019-02-26 PROCEDURE — 74181 MRI ABDOMEN W/O CONTRAST: CPT

## 2019-03-01 ENCOUNTER — TELEPHONE (OUTPATIENT)
Dept: FAMILY MEDICINE CLINIC | Facility: CLINIC | Age: 49
End: 2019-03-01

## 2019-03-01 NOTE — TELEPHONE ENCOUNTER
----- Message from Shakira Marqeuz sent at 3/1/2019  3:50 PM EST -----  Contact: PT.  PT. SEE'S LONA.  PT. JUST COMPLETED HER ON LINE [employer].  SURGERY IS FOR 03- @ 1 PM WITH DR. RAYMUNDO HANNAH/GENERAL SURGERY.  PT. WANTING TO BE AWARE A FAX WILL BE COMING OVER FOR US TO FILL OUT.    PT. CAN BE REACHED @ ABOVE HOME #.    (PT. STATED SHE HAS NEVER HAD TO DO ABOVE BEFORE, [employer] TOLD HER TO CONTACT HER PCP.).

## 2019-03-04 ENCOUNTER — APPOINTMENT (OUTPATIENT)
Dept: PREADMISSION TESTING | Facility: HOSPITAL | Age: 49
End: 2019-03-04

## 2019-03-04 ENCOUNTER — ANESTHESIA EVENT (OUTPATIENT)
Dept: PERIOP | Facility: HOSPITAL | Age: 49
End: 2019-03-04

## 2019-03-04 VITALS — BODY MASS INDEX: 33.09 KG/M2 | HEIGHT: 63 IN | WEIGHT: 186.73 LBS

## 2019-03-04 LAB
APTT PPP: 32.7 SECONDS (ref 24–37)
INR PPP: 1.06 (ref 0.85–1.16)
PROTHROMBIN TIME: 13.3 SECONDS (ref 11.2–14.3)

## 2019-03-04 PROCEDURE — 36415 COLL VENOUS BLD VENIPUNCTURE: CPT

## 2019-03-04 PROCEDURE — 93005 ELECTROCARDIOGRAM TRACING: CPT

## 2019-03-04 PROCEDURE — 93010 ELECTROCARDIOGRAM REPORT: CPT | Performed by: INTERNAL MEDICINE

## 2019-03-04 PROCEDURE — 85610 PROTHROMBIN TIME: CPT | Performed by: SURGERY

## 2019-03-04 PROCEDURE — 85730 THROMBOPLASTIN TIME PARTIAL: CPT | Performed by: SURGERY

## 2019-03-04 RX ORDER — CEFAZOLIN SODIUM 2 G/100ML
2 INJECTION, SOLUTION INTRAVENOUS ONCE
Status: COMPLETED | OUTPATIENT
Start: 2019-03-05 | End: 2019-03-05

## 2019-03-05 ENCOUNTER — ANESTHESIA (OUTPATIENT)
Dept: PERIOP | Facility: HOSPITAL | Age: 49
End: 2019-03-05

## 2019-03-05 ENCOUNTER — HOSPITAL ENCOUNTER (OUTPATIENT)
Facility: HOSPITAL | Age: 49
Discharge: HOME | End: 2019-03-07
Attending: SURGERY | Admitting: SURGERY

## 2019-03-05 ENCOUNTER — APPOINTMENT (OUTPATIENT)
Dept: GENERAL RADIOLOGY | Facility: HOSPITAL | Age: 49
End: 2019-03-05

## 2019-03-05 DIAGNOSIS — K80.50 CHOLEDOCHOLITHIASIS: Primary | ICD-10-CM

## 2019-03-05 DIAGNOSIS — K80.50 CHOLEDOCHOLITHIASIS: ICD-10-CM

## 2019-03-05 DIAGNOSIS — K80.20 CHOLELITHIASIS: ICD-10-CM

## 2019-03-05 LAB
B-HCG UR QL: NEGATIVE
INTERNAL NEGATIVE CONTROL: NEGATIVE
INTERNAL POSITIVE CONTROL: POSITIVE
Lab: NORMAL
POTASSIUM BLDA-SCNC: 3.66 MMOL/L (ref 3.5–5.3)

## 2019-03-05 PROCEDURE — 25010000002 HYDROMORPHONE PER 4 MG: Performed by: NURSE ANESTHETIST, CERTIFIED REGISTERED

## 2019-03-05 PROCEDURE — 25010000002 PROMETHAZINE PER 50 MG: Performed by: NURSE ANESTHETIST, CERTIFIED REGISTERED

## 2019-03-05 PROCEDURE — 25010000002 ONDANSETRON PER 1 MG: Performed by: NURSE ANESTHETIST, CERTIFIED REGISTERED

## 2019-03-05 PROCEDURE — G0378 HOSPITAL OBSERVATION PER HR: HCPCS

## 2019-03-05 PROCEDURE — 81025 URINE PREGNANCY TEST: CPT | Performed by: SURGERY

## 2019-03-05 PROCEDURE — 88304 TISSUE EXAM BY PATHOLOGIST: CPT | Performed by: SURGERY

## 2019-03-05 PROCEDURE — 94799 UNLISTED PULMONARY SVC/PX: CPT

## 2019-03-05 PROCEDURE — 25010000002 DEXAMETHASONE PER 1 MG: Performed by: NURSE ANESTHETIST, CERTIFIED REGISTERED

## 2019-03-05 PROCEDURE — 25010000002 FENTANYL CITRATE (PF) 100 MCG/2ML SOLUTION: Performed by: NURSE ANESTHETIST, CERTIFIED REGISTERED

## 2019-03-05 PROCEDURE — 25010000002 IOPAMIDOL 61 % SOLUTION: Performed by: SURGERY

## 2019-03-05 PROCEDURE — 25010000002 KETOROLAC TROMETHAMINE PER 15 MG: Performed by: NURSE ANESTHETIST, CERTIFIED REGISTERED

## 2019-03-05 PROCEDURE — 25010000002 NEOSTIGMINE 10 MG/10ML SOLUTION: Performed by: NURSE ANESTHETIST, CERTIFIED REGISTERED

## 2019-03-05 PROCEDURE — 74300 X-RAY BILE DUCTS/PANCREAS: CPT

## 2019-03-05 PROCEDURE — 25010000002 PROPOFOL 10 MG/ML EMULSION: Performed by: NURSE ANESTHETIST, CERTIFIED REGISTERED

## 2019-03-05 PROCEDURE — 84132 ASSAY OF SERUM POTASSIUM: CPT | Performed by: ANESTHESIOLOGY

## 2019-03-05 PROCEDURE — 25010000003 CEFAZOLIN IN DEXTROSE 2-4 GM/100ML-% SOLUTION: Performed by: SURGERY

## 2019-03-05 PROCEDURE — 99244 OFF/OP CNSLTJ NEW/EST MOD 40: CPT | Performed by: INTERNAL MEDICINE

## 2019-03-05 RX ORDER — FENTANYL CITRATE 50 UG/ML
50 INJECTION, SOLUTION INTRAMUSCULAR; INTRAVENOUS
Status: DISCONTINUED | OUTPATIENT
Start: 2019-03-05 | End: 2019-03-05 | Stop reason: HOSPADM

## 2019-03-05 RX ORDER — ONDANSETRON 2 MG/ML
INJECTION INTRAMUSCULAR; INTRAVENOUS AS NEEDED
Status: DISCONTINUED | OUTPATIENT
Start: 2019-03-05 | End: 2019-03-05 | Stop reason: SURG

## 2019-03-05 RX ORDER — NALOXONE HCL 0.4 MG/ML
0.4 VIAL (ML) INJECTION
Status: DISCONTINUED | OUTPATIENT
Start: 2019-03-05 | End: 2019-03-07 | Stop reason: HOSPADM

## 2019-03-05 RX ORDER — MEPERIDINE HYDROCHLORIDE 25 MG/ML
12.5 INJECTION INTRAMUSCULAR; INTRAVENOUS; SUBCUTANEOUS
Status: DISCONTINUED | OUTPATIENT
Start: 2019-03-05 | End: 2019-03-05 | Stop reason: HOSPADM

## 2019-03-05 RX ORDER — FAMOTIDINE 20 MG/1
20 TABLET, FILM COATED ORAL
Status: DISCONTINUED | OUTPATIENT
Start: 2019-03-05 | End: 2019-03-05 | Stop reason: HOSPADM

## 2019-03-05 RX ORDER — GLYCOPYRROLATE 0.2 MG/ML
INJECTION INTRAMUSCULAR; INTRAVENOUS AS NEEDED
Status: DISCONTINUED | OUTPATIENT
Start: 2019-03-05 | End: 2019-03-05 | Stop reason: SURG

## 2019-03-05 RX ORDER — IPRATROPIUM BROMIDE AND ALBUTEROL SULFATE 2.5; .5 MG/3ML; MG/3ML
3 SOLUTION RESPIRATORY (INHALATION) ONCE AS NEEDED
Status: DISCONTINUED | OUTPATIENT
Start: 2019-03-05 | End: 2019-03-05 | Stop reason: HOSPADM

## 2019-03-05 RX ORDER — PROMETHAZINE HYDROCHLORIDE 25 MG/ML
6.25 INJECTION, SOLUTION INTRAMUSCULAR; INTRAVENOUS ONCE AS NEEDED
Status: COMPLETED | OUTPATIENT
Start: 2019-03-05 | End: 2019-03-05

## 2019-03-05 RX ORDER — LIDOCAINE HYDROCHLORIDE 10 MG/ML
0.5 INJECTION, SOLUTION EPIDURAL; INFILTRATION; INTRACAUDAL; PERINEURAL ONCE AS NEEDED
Status: COMPLETED | OUTPATIENT
Start: 2019-03-05 | End: 2019-03-05

## 2019-03-05 RX ORDER — HYDROMORPHONE HYDROCHLORIDE 1 MG/ML
0.5 INJECTION, SOLUTION INTRAMUSCULAR; INTRAVENOUS; SUBCUTANEOUS
Status: DISCONTINUED | OUTPATIENT
Start: 2019-03-05 | End: 2019-03-05 | Stop reason: HOSPADM

## 2019-03-05 RX ORDER — SODIUM CHLORIDE 0.9 % (FLUSH) 0.9 %
3 SYRINGE (ML) INJECTION EVERY 12 HOURS SCHEDULED
Status: DISCONTINUED | OUTPATIENT
Start: 2019-03-05 | End: 2019-03-05 | Stop reason: HOSPADM

## 2019-03-05 RX ORDER — ROCURONIUM BROMIDE 10 MG/ML
INJECTION, SOLUTION INTRAVENOUS AS NEEDED
Status: DISCONTINUED | OUTPATIENT
Start: 2019-03-05 | End: 2019-03-05 | Stop reason: SURG

## 2019-03-05 RX ORDER — FLUOXETINE HYDROCHLORIDE 20 MG/1
20 CAPSULE ORAL DAILY
Status: DISCONTINUED | OUTPATIENT
Start: 2019-03-05 | End: 2019-03-07 | Stop reason: HOSPADM

## 2019-03-05 RX ORDER — DOCUSATE SODIUM 100 MG/1
100 CAPSULE, LIQUID FILLED ORAL 2 TIMES DAILY
Status: DISCONTINUED | OUTPATIENT
Start: 2019-03-05 | End: 2019-03-07 | Stop reason: HOSPADM

## 2019-03-05 RX ORDER — OXYCODONE HYDROCHLORIDE AND ACETAMINOPHEN 5; 325 MG/1; MG/1
2 TABLET ORAL EVERY 4 HOURS PRN
Status: DISCONTINUED | OUTPATIENT
Start: 2019-03-05 | End: 2019-03-07 | Stop reason: HOSPADM

## 2019-03-05 RX ORDER — LIDOCAINE HYDROCHLORIDE 10 MG/ML
INJECTION, SOLUTION EPIDURAL; INFILTRATION; INTRACAUDAL; PERINEURAL AS NEEDED
Status: DISCONTINUED | OUTPATIENT
Start: 2019-03-05 | End: 2019-03-05 | Stop reason: SURG

## 2019-03-05 RX ORDER — SODIUM CHLORIDE, SODIUM LACTATE, POTASSIUM CHLORIDE, CALCIUM CHLORIDE 600; 310; 30; 20 MG/100ML; MG/100ML; MG/100ML; MG/100ML
9 INJECTION, SOLUTION INTRAVENOUS CONTINUOUS PRN
Status: DISCONTINUED | OUTPATIENT
Start: 2019-03-05 | End: 2019-03-05 | Stop reason: HOSPADM

## 2019-03-05 RX ORDER — TRIAMTERENE AND HYDROCHLOROTHIAZIDE 37.5; 25 MG/1; MG/1
1 TABLET ORAL DAILY
Status: DISCONTINUED | OUTPATIENT
Start: 2019-03-06 | End: 2019-03-07 | Stop reason: HOSPADM

## 2019-03-05 RX ORDER — KETOROLAC TROMETHAMINE 30 MG/ML
INJECTION, SOLUTION INTRAMUSCULAR; INTRAVENOUS AS NEEDED
Status: DISCONTINUED | OUTPATIENT
Start: 2019-03-05 | End: 2019-03-05 | Stop reason: SURG

## 2019-03-05 RX ORDER — ONDANSETRON 2 MG/ML
4 INJECTION INTRAMUSCULAR; INTRAVENOUS ONCE AS NEEDED
Status: DISCONTINUED | OUTPATIENT
Start: 2019-03-05 | End: 2019-03-05 | Stop reason: HOSPADM

## 2019-03-05 RX ORDER — SODIUM CHLORIDE 0.9 % (FLUSH) 0.9 %
1-10 SYRINGE (ML) INJECTION AS NEEDED
Status: DISCONTINUED | OUTPATIENT
Start: 2019-03-05 | End: 2019-03-05 | Stop reason: HOSPADM

## 2019-03-05 RX ORDER — PROPOFOL 10 MG/ML
VIAL (ML) INTRAVENOUS AS NEEDED
Status: DISCONTINUED | OUTPATIENT
Start: 2019-03-05 | End: 2019-03-05 | Stop reason: SURG

## 2019-03-05 RX ORDER — PROMETHAZINE HYDROCHLORIDE 25 MG/1
25 TABLET ORAL ONCE AS NEEDED
Status: COMPLETED | OUTPATIENT
Start: 2019-03-05 | End: 2019-03-05

## 2019-03-05 RX ORDER — ACETAMINOPHEN 325 MG/1
650 TABLET ORAL ONCE AS NEEDED
Status: DISCONTINUED | OUTPATIENT
Start: 2019-03-05 | End: 2019-03-05 | Stop reason: HOSPADM

## 2019-03-05 RX ORDER — MORPHINE SULFATE 2 MG/ML
2 INJECTION, SOLUTION INTRAMUSCULAR; INTRAVENOUS
Status: DISCONTINUED | OUTPATIENT
Start: 2019-03-05 | End: 2019-03-07 | Stop reason: HOSPADM

## 2019-03-05 RX ORDER — LIDOCAINE HYDROCHLORIDE 40 MG/ML
SOLUTION TOPICAL AS NEEDED
Status: DISCONTINUED | OUTPATIENT
Start: 2019-03-05 | End: 2019-03-05 | Stop reason: SURG

## 2019-03-05 RX ORDER — DEXAMETHASONE SODIUM PHOSPHATE 4 MG/ML
INJECTION, SOLUTION INTRA-ARTICULAR; INTRALESIONAL; INTRAMUSCULAR; INTRAVENOUS; SOFT TISSUE AS NEEDED
Status: DISCONTINUED | OUTPATIENT
Start: 2019-03-05 | End: 2019-03-05 | Stop reason: SURG

## 2019-03-05 RX ORDER — OXYCODONE AND ACETAMINOPHEN 10; 325 MG/1; MG/1
1 TABLET ORAL EVERY 4 HOURS PRN
Status: DISCONTINUED | OUTPATIENT
Start: 2019-03-05 | End: 2019-03-05 | Stop reason: HOSPADM

## 2019-03-05 RX ORDER — SODIUM CHLORIDE, SODIUM LACTATE, POTASSIUM CHLORIDE, CALCIUM CHLORIDE 600; 310; 30; 20 MG/100ML; MG/100ML; MG/100ML; MG/100ML
125 INJECTION, SOLUTION INTRAVENOUS CONTINUOUS
Status: DISCONTINUED | OUTPATIENT
Start: 2019-03-05 | End: 2019-03-07 | Stop reason: HOSPADM

## 2019-03-05 RX ORDER — SODIUM CHLORIDE 9 MG/ML
INJECTION, SOLUTION INTRAVENOUS AS NEEDED
Status: DISCONTINUED | OUTPATIENT
Start: 2019-03-05 | End: 2019-03-05 | Stop reason: HOSPADM

## 2019-03-05 RX ORDER — FENTANYL CITRATE 50 UG/ML
INJECTION, SOLUTION INTRAMUSCULAR; INTRAVENOUS AS NEEDED
Status: DISCONTINUED | OUTPATIENT
Start: 2019-03-05 | End: 2019-03-05 | Stop reason: SURG

## 2019-03-05 RX ORDER — OXYCODONE HYDROCHLORIDE AND ACETAMINOPHEN 5; 325 MG/1; MG/1
1 TABLET ORAL ONCE AS NEEDED
Status: DISCONTINUED | OUTPATIENT
Start: 2019-03-05 | End: 2019-03-05 | Stop reason: HOSPADM

## 2019-03-05 RX ORDER — BUPIVACAINE HYDROCHLORIDE AND EPINEPHRINE 5; 5 MG/ML; UG/ML
INJECTION, SOLUTION PERINEURAL AS NEEDED
Status: DISCONTINUED | OUTPATIENT
Start: 2019-03-05 | End: 2019-03-05 | Stop reason: HOSPADM

## 2019-03-05 RX ORDER — PROMETHAZINE HYDROCHLORIDE 25 MG/1
25 SUPPOSITORY RECTAL ONCE AS NEEDED
Status: COMPLETED | OUTPATIENT
Start: 2019-03-05 | End: 2019-03-05

## 2019-03-05 RX ORDER — ACETAMINOPHEN 650 MG/1
650 SUPPOSITORY RECTAL ONCE AS NEEDED
Status: DISCONTINUED | OUTPATIENT
Start: 2019-03-05 | End: 2019-03-05 | Stop reason: HOSPADM

## 2019-03-05 RX ORDER — NEOSTIGMINE METHYLSULFATE 1 MG/ML
INJECTION, SOLUTION INTRAVENOUS AS NEEDED
Status: DISCONTINUED | OUTPATIENT
Start: 2019-03-05 | End: 2019-03-05 | Stop reason: SURG

## 2019-03-05 RX ADMIN — KETOROLAC TROMETHAMINE 30 MG: 30 INJECTION, SOLUTION INTRAMUSCULAR at 14:19

## 2019-03-05 RX ADMIN — LIDOCAINE HYDROCHLORIDE 0.5 ML: 10 INJECTION, SOLUTION EPIDURAL; INFILTRATION; INTRACAUDAL; PERINEURAL at 11:54

## 2019-03-05 RX ADMIN — FAMOTIDINE 20 MG: 20 TABLET ORAL at 11:56

## 2019-03-05 RX ADMIN — ONDANSETRON 4 MG: 2 INJECTION INTRAMUSCULAR; INTRAVENOUS at 14:19

## 2019-03-05 RX ADMIN — PROMETHAZINE HYDROCHLORIDE 6.25 MG: 25 INJECTION INTRAMUSCULAR; INTRAVENOUS at 14:47

## 2019-03-05 RX ADMIN — NEOSTIGMINE METHYLSULFATE 3 MG: 1 INJECTION, SOLUTION INTRAVENOUS at 14:19

## 2019-03-05 RX ADMIN — FLUOXETINE HYDROCHLORIDE 20 MG: 20 CAPSULE ORAL at 19:52

## 2019-03-05 RX ADMIN — FENTANYL CITRATE 50 MCG: 50 INJECTION, SOLUTION INTRAMUSCULAR; INTRAVENOUS at 13:16

## 2019-03-05 RX ADMIN — OXYCODONE AND ACETAMINOPHEN 2 TABLET: 5; 325 TABLET ORAL at 23:30

## 2019-03-05 RX ADMIN — FENTANYL CITRATE 50 MCG: 50 INJECTION, SOLUTION INTRAMUSCULAR; INTRAVENOUS at 14:55

## 2019-03-05 RX ADMIN — SODIUM CHLORIDE, POTASSIUM CHLORIDE, SODIUM LACTATE AND CALCIUM CHLORIDE 125 ML/HR: 600; 310; 30; 20 INJECTION, SOLUTION INTRAVENOUS at 23:30

## 2019-03-05 RX ADMIN — HYDROMORPHONE HYDROCHLORIDE 0.5 MG: 1 INJECTION, SOLUTION INTRAMUSCULAR; INTRAVENOUS; SUBCUTANEOUS at 16:13

## 2019-03-05 RX ADMIN — SODIUM CHLORIDE, POTASSIUM CHLORIDE, SODIUM LACTATE AND CALCIUM CHLORIDE 9 ML/HR: 600; 310; 30; 20 INJECTION, SOLUTION INTRAVENOUS at 11:54

## 2019-03-05 RX ADMIN — LIDOCAINE HYDROCHLORIDE 50 MG: 10 INJECTION, SOLUTION EPIDURAL; INFILTRATION; INTRACAUDAL; PERINEURAL at 13:20

## 2019-03-05 RX ADMIN — SODIUM CHLORIDE, POTASSIUM CHLORIDE, SODIUM LACTATE AND CALCIUM CHLORIDE: 600; 310; 30; 20 INJECTION, SOLUTION INTRAVENOUS at 13:16

## 2019-03-05 RX ADMIN — DEXAMETHASONE SODIUM PHOSPHATE 8 MG: 4 INJECTION, SOLUTION INTRAMUSCULAR; INTRAVENOUS at 13:37

## 2019-03-05 RX ADMIN — SODIUM CHLORIDE, POTASSIUM CHLORIDE, SODIUM LACTATE AND CALCIUM CHLORIDE: 600; 310; 30; 20 INJECTION, SOLUTION INTRAVENOUS at 14:03

## 2019-03-05 RX ADMIN — OXYCODONE AND ACETAMINOPHEN 2 TABLET: 5; 325 TABLET ORAL at 19:51

## 2019-03-05 RX ADMIN — FENTANYL CITRATE 50 MCG: 50 INJECTION, SOLUTION INTRAMUSCULAR; INTRAVENOUS at 15:23

## 2019-03-05 RX ADMIN — ROCURONIUM BROMIDE 35 MG: 10 SOLUTION INTRAVENOUS at 13:20

## 2019-03-05 RX ADMIN — LIDOCAINE HYDROCHLORIDE 1 EACH: 40 SOLUTION TOPICAL at 13:21

## 2019-03-05 RX ADMIN — DOCUSATE SODIUM 100 MG: 100 CAPSULE, LIQUID FILLED ORAL at 19:52

## 2019-03-05 RX ADMIN — GLYCOPYRROLATE 0.4 MG: 0.2 INJECTION, SOLUTION INTRAMUSCULAR; INTRAVENOUS at 14:19

## 2019-03-05 RX ADMIN — CEFAZOLIN SODIUM 2 G: 2 INJECTION, SOLUTION INTRAVENOUS at 13:34

## 2019-03-05 RX ADMIN — GLYCOPYRROLATE 0.2 MG: 0.2 INJECTION, SOLUTION INTRAMUSCULAR; INTRAVENOUS at 13:41

## 2019-03-05 RX ADMIN — GLYCOPYRROLATE 0.2 MG: 0.2 INJECTION, SOLUTION INTRAMUSCULAR; INTRAVENOUS at 13:39

## 2019-03-05 RX ADMIN — PROPOFOL 200 MG: 10 INJECTION, EMULSION INTRAVENOUS at 13:20

## 2019-03-05 NOTE — ANESTHESIA PROCEDURE NOTES
Airway  Urgency: elective    Date/Time: 3/5/2019 1:20 PM  End Time:3/5/2019 1:21 PM  Airway not difficult    General Information and Staff    Patient location during procedure: OR  CRNA: Rayshawn King CRNA    Indications and Patient Condition  Indications for airway management: airway protection    Preoxygenated: yes  MILS not maintained throughout  Mask difficulty assessment: 1 - vent by mask    Final Airway Details  Final airway type: endotracheal airway      Successful airway: ETT  Cuffed: yes   Successful intubation technique: direct laryngoscopy  Endotracheal tube insertion site: oral  Blade: Terrie  Blade size: 3  ETT size (mm): 7.0  Cormack-Lehane Classification: grade I - full view of glottis  Placement verified by: chest auscultation and capnometry   Measured from: lips  ETT to lips (cm): 21  Number of attempts at approach: 1    Additional Comments  Negative epigastric sounds, Breath sound equal bilaterally with symmetric chest rise and fall

## 2019-03-05 NOTE — ANESTHESIA POSTPROCEDURE EVALUATION
Patient: Aniya Castro    Procedure Summary     Date:  03/05/19 Room / [facility]:   [facility]    Anesthesia Start:  1316 Anesthesia Stop:  1436    Procedure:  CHOLECYSTECTOMY LAPAROSCOPIC WITH IOC (N/A Abdomen) Diagnosis:      Surgeon:  Wali Nicole MD Provider:  Murali Tom MD    Anesthesia Type:  general ASA Status:  2          Anesthesia Type: general  Last vitals  BP   104/65 (03/05/19 1431)   Temp   98.9 °F (37.2 °C) (03/05/19 1431)   Pulse   67 (03/05/19 1431)   Resp   18 (03/05/19 1431)     SpO2   100 % (03/05/19 1431)     Post Anesthesia Care and Evaluation    Patient location during evaluation: PACU  Patient participation: complete - patient participated  Level of consciousness: awake and alert  Pain score: 0  Pain management: adequate  Airway patency: patent  Anesthetic complications: No anesthetic complications  PONV Status: none  Cardiovascular status: hemodynamically stable and acceptable  Respiratory status: nonlabored ventilation, acceptable and nasal cannula  Hydration status: acceptable

## 2019-03-05 NOTE — ANESTHESIA PREPROCEDURE EVALUATION
Anesthesia Evaluation     Patient summary reviewed and Nursing notes reviewed     NPO Liquid Status: > 8 hours           Airway   Mallampati: I  TM distance: >3 FB  Neck ROM: full  No difficulty expected  Dental      Pulmonary    (+) recent URI resolved, sleep apnea on CPAP,   (-) COPD, asthma, shortness of breath, not a smoker  Cardiovascular     ECG reviewed    (+) hypertension,   (-) past MI, CAD, dysrhythmias, angina, CHF, cardiac stents      Neuro/Psych  (-) seizures  GI/Hepatic/Renal/Endo    (-) liver disease, no renal disease, diabetes, hypothyroidism    Musculoskeletal     Abdominal    Substance History      OB/GYN          Other                        Anesthesia Plan    ASA 2     general   (Propofol Infusion as part of Anti PONV tech )  intravenous induction   Anesthetic plan, all risks, benefits, and alternatives have been provided, discussed and informed consent has been obtained with: patient.    Plan discussed with CRNA.

## 2019-03-06 ENCOUNTER — APPOINTMENT (OUTPATIENT)
Dept: GENERAL RADIOLOGY | Facility: HOSPITAL | Age: 49
End: 2019-03-06

## 2019-03-06 ENCOUNTER — ANESTHESIA EVENT (OUTPATIENT)
Dept: GASTROENTEROLOGY | Facility: HOSPITAL | Age: 49
End: 2019-03-06

## 2019-03-06 ENCOUNTER — ANESTHESIA (OUTPATIENT)
Dept: GASTROENTEROLOGY | Facility: HOSPITAL | Age: 49
End: 2019-03-06

## 2019-03-06 DIAGNOSIS — K27.9 PUD (PEPTIC ULCER DISEASE): Primary | ICD-10-CM

## 2019-03-06 LAB
ALBUMIN SERPL-MCNC: 3.71 G/DL (ref 3.2–4.8)
ALBUMIN/GLOB SERPL: 2.2 G/DL (ref 1.5–2.5)
ALP SERPL-CCNC: 120 U/L (ref 25–100)
ALT SERPL W P-5'-P-CCNC: 343 U/L (ref 7–40)
ANION GAP SERPL CALCULATED.3IONS-SCNC: 6 MMOL/L (ref 3–11)
AST SERPL-CCNC: 316 U/L (ref 0–33)
BASOPHILS # BLD AUTO: 0.02 10*3/MM3 (ref 0–0.2)
BASOPHILS NFR BLD AUTO: 0.2 % (ref 0–1)
BILIRUB CONJ SERPL-MCNC: 1.3 MG/DL (ref 0–0.2)
BILIRUB SERPL-MCNC: 2 MG/DL (ref 0.3–1.2)
BUN BLD-MCNC: 13 MG/DL (ref 9–23)
BUN/CREAT SERPL: 20.3 (ref 7–25)
CALCIUM SPEC-SCNC: 8.6 MG/DL (ref 8.7–10.4)
CHLORIDE SERPL-SCNC: 108 MMOL/L (ref 99–109)
CO2 SERPL-SCNC: 24 MMOL/L (ref 20–31)
CREAT BLD-MCNC: 0.64 MG/DL (ref 0.6–1.3)
CYTO UR: NORMAL
DEPRECATED RDW RBC AUTO: 42.6 FL (ref 37–54)
EOSINOPHIL # BLD AUTO: 0 10*3/MM3 (ref 0–0.3)
EOSINOPHIL NFR BLD AUTO: 0 % (ref 0–3)
ERYTHROCYTE [DISTWIDTH] IN BLOOD BY AUTOMATED COUNT: 13.2 % (ref 11.3–14.5)
GFR SERPL CREATININE-BSD FRML MDRD: 99 ML/MIN/1.73
GLOBULIN UR ELPH-MCNC: 1.7 GM/DL
GLUCOSE BLD-MCNC: 105 MG/DL (ref 70–100)
HCT VFR BLD AUTO: 37.5 % (ref 34.5–44)
HGB BLD-MCNC: 12.3 G/DL (ref 11.5–15.5)
IMM GRANULOCYTES # BLD AUTO: 0.02 10*3/MM3 (ref 0–0.05)
IMM GRANULOCYTES NFR BLD AUTO: 0.2 % (ref 0–0.6)
LAB AP CASE REPORT: NORMAL
LAB AP CLINICAL INFORMATION: NORMAL
LYMPHOCYTES # BLD AUTO: 1.65 10*3/MM3 (ref 0.6–4.8)
LYMPHOCYTES NFR BLD AUTO: 17.5 % (ref 24–44)
MCH RBC QN AUTO: 29.2 PG (ref 27–31)
MCHC RBC AUTO-ENTMCNC: 32.8 G/DL (ref 32–36)
MCV RBC AUTO: 89.1 FL (ref 80–99)
MONOCYTES # BLD AUTO: 0.92 10*3/MM3 (ref 0–1)
MONOCYTES NFR BLD AUTO: 9.7 % (ref 0–12)
NEUTROPHILS # BLD AUTO: 6.86 10*3/MM3 (ref 1.5–8.3)
NEUTROPHILS NFR BLD AUTO: 72.6 % (ref 41–71)
PATH REPORT.FINAL DX SPEC: NORMAL
PATH REPORT.GROSS SPEC: NORMAL
PLAT MORPH BLD: NORMAL
PLATELET # BLD AUTO: 363 10*3/MM3 (ref 150–450)
PMV BLD AUTO: 11.3 FL (ref 6–12)
POTASSIUM BLD-SCNC: 3.5 MMOL/L (ref 3.5–5.5)
PROT SERPL-MCNC: 5.4 G/DL (ref 5.7–8.2)
RBC # BLD AUTO: 4.21 10*6/MM3 (ref 3.89–5.14)
RBC MORPH BLD: NORMAL
SODIUM BLD-SCNC: 138 MMOL/L (ref 132–146)
WBC MORPH BLD: NORMAL
WBC NRBC COR # BLD: 9.45 10*3/MM3 (ref 3.5–10.8)

## 2019-03-06 PROCEDURE — 94799 UNLISTED PULMONARY SVC/PX: CPT

## 2019-03-06 PROCEDURE — 25010000002 DEXAMETHASONE PER 1 MG: Performed by: NURSE ANESTHETIST, CERTIFIED REGISTERED

## 2019-03-06 PROCEDURE — G0378 HOSPITAL OBSERVATION PER HR: HCPCS

## 2019-03-06 PROCEDURE — 85025 COMPLETE CBC W/AUTO DIFF WBC: CPT | Performed by: SURGERY

## 2019-03-06 PROCEDURE — 82248 BILIRUBIN DIRECT: CPT | Performed by: INTERNAL MEDICINE

## 2019-03-06 PROCEDURE — C1769 GUIDE WIRE: HCPCS | Performed by: INTERNAL MEDICINE

## 2019-03-06 PROCEDURE — 80053 COMPREHEN METABOLIC PANEL: CPT | Performed by: SURGERY

## 2019-03-06 PROCEDURE — 25010000002 PROPOFOL 10 MG/ML EMULSION: Performed by: NURSE ANESTHETIST, CERTIFIED REGISTERED

## 2019-03-06 PROCEDURE — 85007 BL SMEAR W/DIFF WBC COUNT: CPT | Performed by: SURGERY

## 2019-03-06 PROCEDURE — 25010000002 MORPHINE PER 10 MG: Performed by: SURGERY

## 2019-03-06 PROCEDURE — 25010000002 IOPAMIDOL 61 % SOLUTION: Performed by: INTERNAL MEDICINE

## 2019-03-06 PROCEDURE — 25010000002 ONDANSETRON PER 1 MG: Performed by: NURSE ANESTHETIST, CERTIFIED REGISTERED

## 2019-03-06 PROCEDURE — 74330 X-RAY BILE/PANC ENDOSCOPY: CPT

## 2019-03-06 RX ORDER — SODIUM CHLORIDE 0.9 % (FLUSH) 0.9 %
3-10 SYRINGE (ML) INJECTION AS NEEDED
Status: DISCONTINUED | OUTPATIENT
Start: 2019-03-06 | End: 2019-03-06 | Stop reason: HOSPADM

## 2019-03-06 RX ORDER — ATRACURIUM BESYLATE 10 MG/ML
INJECTION, SOLUTION INTRAVENOUS AS NEEDED
Status: DISCONTINUED | OUTPATIENT
Start: 2019-03-06 | End: 2019-03-06 | Stop reason: SURG

## 2019-03-06 RX ORDER — SODIUM CHLORIDE, SODIUM LACTATE, POTASSIUM CHLORIDE, CALCIUM CHLORIDE 600; 310; 30; 20 MG/100ML; MG/100ML; MG/100ML; MG/100ML
9 INJECTION, SOLUTION INTRAVENOUS CONTINUOUS
Status: CANCELLED | OUTPATIENT
Start: 2019-03-06

## 2019-03-06 RX ORDER — NEOSTIGMINE METHYLSULFATE 5 MG/5 ML
SYRINGE (ML) INTRAVENOUS AS NEEDED
Status: DISCONTINUED | OUTPATIENT
Start: 2019-03-06 | End: 2019-03-06 | Stop reason: SURG

## 2019-03-06 RX ORDER — SODIUM CHLORIDE 0.9 % (FLUSH) 0.9 %
3 SYRINGE (ML) INJECTION EVERY 12 HOURS SCHEDULED
Status: DISCONTINUED | OUTPATIENT
Start: 2019-03-06 | End: 2019-03-06 | Stop reason: HOSPADM

## 2019-03-06 RX ORDER — SODIUM CHLORIDE, SODIUM LACTATE, POTASSIUM CHLORIDE, CALCIUM CHLORIDE 600; 310; 30; 20 MG/100ML; MG/100ML; MG/100ML; MG/100ML
9 INJECTION, SOLUTION INTRAVENOUS CONTINUOUS
Status: DISCONTINUED | OUTPATIENT
Start: 2019-03-06 | End: 2019-03-07 | Stop reason: HOSPADM

## 2019-03-06 RX ORDER — LIDOCAINE HYDROCHLORIDE 10 MG/ML
INJECTION, SOLUTION INFILTRATION; PERINEURAL AS NEEDED
Status: DISCONTINUED | OUTPATIENT
Start: 2019-03-06 | End: 2019-03-06 | Stop reason: SURG

## 2019-03-06 RX ORDER — FAMOTIDINE 20 MG/1
20 TABLET, FILM COATED ORAL ONCE
Status: CANCELLED | OUTPATIENT
Start: 2019-03-06 | End: 2019-03-06

## 2019-03-06 RX ORDER — SODIUM CHLORIDE 0.9 % (FLUSH) 0.9 %
3-10 SYRINGE (ML) INJECTION AS NEEDED
Status: CANCELLED | OUTPATIENT
Start: 2019-03-06

## 2019-03-06 RX ORDER — LIDOCAINE HYDROCHLORIDE 10 MG/ML
0.5 INJECTION, SOLUTION EPIDURAL; INFILTRATION; INTRACAUDAL; PERINEURAL ONCE AS NEEDED
Status: CANCELLED | OUTPATIENT
Start: 2019-03-06

## 2019-03-06 RX ORDER — FAMOTIDINE 10 MG/ML
20 INJECTION, SOLUTION INTRAVENOUS ONCE
Status: COMPLETED | OUTPATIENT
Start: 2019-03-06 | End: 2019-03-06

## 2019-03-06 RX ORDER — SODIUM CHLORIDE 0.9 % (FLUSH) 0.9 %
3 SYRINGE (ML) INJECTION EVERY 12 HOURS SCHEDULED
Status: CANCELLED | OUTPATIENT
Start: 2019-03-06

## 2019-03-06 RX ORDER — PANTOPRAZOLE SODIUM 40 MG/1
40 TABLET, DELAYED RELEASE ORAL
Status: DISCONTINUED | OUTPATIENT
Start: 2019-03-07 | End: 2019-03-06 | Stop reason: HOSPADM

## 2019-03-06 RX ORDER — PROPOFOL 10 MG/ML
VIAL (ML) INTRAVENOUS AS NEEDED
Status: DISCONTINUED | OUTPATIENT
Start: 2019-03-06 | End: 2019-03-06 | Stop reason: SURG

## 2019-03-06 RX ORDER — FENTANYL CITRATE 50 UG/ML
50 INJECTION, SOLUTION INTRAMUSCULAR; INTRAVENOUS
Status: DISCONTINUED | OUTPATIENT
Start: 2019-03-06 | End: 2019-03-06 | Stop reason: HOSPADM

## 2019-03-06 RX ORDER — ESMOLOL HYDROCHLORIDE 10 MG/ML
INJECTION INTRAVENOUS AS NEEDED
Status: DISCONTINUED | OUTPATIENT
Start: 2019-03-06 | End: 2019-03-06 | Stop reason: SURG

## 2019-03-06 RX ORDER — GLYCOPYRROLATE 0.2 MG/ML
INJECTION INTRAMUSCULAR; INTRAVENOUS AS NEEDED
Status: DISCONTINUED | OUTPATIENT
Start: 2019-03-06 | End: 2019-03-06 | Stop reason: SURG

## 2019-03-06 RX ORDER — ONDANSETRON 2 MG/ML
INJECTION INTRAMUSCULAR; INTRAVENOUS AS NEEDED
Status: DISCONTINUED | OUTPATIENT
Start: 2019-03-06 | End: 2019-03-06 | Stop reason: SURG

## 2019-03-06 RX ORDER — LIDOCAINE HYDROCHLORIDE 10 MG/ML
0.5 INJECTION, SOLUTION EPIDURAL; INFILTRATION; INTRACAUDAL; PERINEURAL ONCE AS NEEDED
Status: DISCONTINUED | OUTPATIENT
Start: 2019-03-06 | End: 2019-03-06 | Stop reason: HOSPADM

## 2019-03-06 RX ORDER — FAMOTIDINE 10 MG/ML
20 INJECTION, SOLUTION INTRAVENOUS ONCE
Status: CANCELLED | OUTPATIENT
Start: 2019-03-06 | End: 2019-03-06

## 2019-03-06 RX ORDER — DEXAMETHASONE SODIUM PHOSPHATE 4 MG/ML
INJECTION, SOLUTION INTRA-ARTICULAR; INTRALESIONAL; INTRAMUSCULAR; INTRAVENOUS; SOFT TISSUE AS NEEDED
Status: DISCONTINUED | OUTPATIENT
Start: 2019-03-06 | End: 2019-03-06 | Stop reason: SURG

## 2019-03-06 RX ADMIN — SODIUM CHLORIDE, POTASSIUM CHLORIDE, SODIUM LACTATE AND CALCIUM CHLORIDE 125 ML/HR: 600; 310; 30; 20 INJECTION, SOLUTION INTRAVENOUS at 16:56

## 2019-03-06 RX ADMIN — DOCUSATE SODIUM 100 MG: 100 CAPSULE, LIQUID FILLED ORAL at 20:52

## 2019-03-06 RX ADMIN — LIDOCAINE HYDROCHLORIDE 50 MG: 10 INJECTION, SOLUTION INFILTRATION; PERINEURAL at 14:52

## 2019-03-06 RX ADMIN — OXYCODONE AND ACETAMINOPHEN 2 TABLET: 5; 325 TABLET ORAL at 20:56

## 2019-03-06 RX ADMIN — ONDANSETRON 4 MG: 2 INJECTION INTRAMUSCULAR; INTRAVENOUS at 15:29

## 2019-03-06 RX ADMIN — DEXAMETHASONE SODIUM PHOSPHATE 8 MG: 4 INJECTION, SOLUTION INTRAMUSCULAR; INTRAVENOUS at 15:05

## 2019-03-06 RX ADMIN — GLYCOPYRROLATE 0.4 MG: 0.2 INJECTION, SOLUTION INTRAMUSCULAR; INTRAVENOUS at 15:24

## 2019-03-06 RX ADMIN — Medication 3 MG: at 15:24

## 2019-03-06 RX ADMIN — ATRACURIUM BESYLATE 30 MG: 10 INJECTION, SOLUTION INTRAVENOUS at 14:52

## 2019-03-06 RX ADMIN — SODIUM CHLORIDE, POTASSIUM CHLORIDE, SODIUM LACTATE AND CALCIUM CHLORIDE 125 ML/HR: 600; 310; 30; 20 INJECTION, SOLUTION INTRAVENOUS at 09:32

## 2019-03-06 RX ADMIN — ESMOLOL HYDROCHLORIDE 20 MG: 10 INJECTION INTRAVENOUS at 14:52

## 2019-03-06 RX ADMIN — PROPOFOL 200 MG: 10 INJECTION, EMULSION INTRAVENOUS at 14:52

## 2019-03-06 RX ADMIN — SODIUM CHLORIDE, POTASSIUM CHLORIDE, SODIUM LACTATE AND CALCIUM CHLORIDE 9 ML/HR: 600; 310; 30; 20 INJECTION, SOLUTION INTRAVENOUS at 13:41

## 2019-03-06 RX ADMIN — SODIUM CHLORIDE, POTASSIUM CHLORIDE, SODIUM LACTATE AND CALCIUM CHLORIDE: 600; 310; 30; 20 INJECTION, SOLUTION INTRAVENOUS at 14:50

## 2019-03-06 RX ADMIN — SODIUM CHLORIDE, POTASSIUM CHLORIDE, SODIUM LACTATE AND CALCIUM CHLORIDE 125 ML/HR: 600; 310; 30; 20 INJECTION, SOLUTION INTRAVENOUS at 20:56

## 2019-03-06 RX ADMIN — MORPHINE SULFATE 2 MG: 2 INJECTION, SOLUTION INTRAMUSCULAR; INTRAVENOUS at 13:05

## 2019-03-06 RX ADMIN — FAMOTIDINE 20 MG: 10 INJECTION, SOLUTION INTRAVENOUS at 13:41

## 2019-03-07 VITALS
BODY MASS INDEX: 32.78 KG/M2 | WEIGHT: 185 LBS | OXYGEN SATURATION: 95 % | RESPIRATION RATE: 16 BRPM | SYSTOLIC BLOOD PRESSURE: 135 MMHG | DIASTOLIC BLOOD PRESSURE: 70 MMHG | HEART RATE: 83 BPM | HEIGHT: 63 IN | TEMPERATURE: 98.5 F

## 2019-03-07 PROBLEM — K26.3 PEPTIC ULCER OF DUODENUM: Status: ACTIVE | Noted: 2019-03-07

## 2019-03-07 LAB
ALBUMIN SERPL-MCNC: 3.62 G/DL (ref 3.2–4.8)
ALBUMIN/GLOB SERPL: 1.9 G/DL (ref 1.5–2.5)
ALP SERPL-CCNC: 138 U/L (ref 25–100)
ALT SERPL W P-5'-P-CCNC: 331 U/L (ref 7–40)
ANION GAP SERPL CALCULATED.3IONS-SCNC: 7 MMOL/L (ref 3–11)
AST SERPL-CCNC: 171 U/L (ref 0–33)
BILIRUB SERPL-MCNC: 2.2 MG/DL (ref 0.3–1.2)
BUN BLD-MCNC: 10 MG/DL (ref 9–23)
BUN/CREAT SERPL: 14.1 (ref 7–25)
CALCIUM SPEC-SCNC: 8.4 MG/DL (ref 8.7–10.4)
CHLORIDE SERPL-SCNC: 109 MMOL/L (ref 99–109)
CO2 SERPL-SCNC: 26 MMOL/L (ref 20–31)
CREAT BLD-MCNC: 0.71 MG/DL (ref 0.6–1.3)
DEPRECATED RDW RBC AUTO: 43.4 FL (ref 37–54)
ERYTHROCYTE [DISTWIDTH] IN BLOOD BY AUTOMATED COUNT: 13.3 % (ref 11.3–14.5)
GFR SERPL CREATININE-BSD FRML MDRD: 88 ML/MIN/1.73
GLOBULIN UR ELPH-MCNC: 1.9 GM/DL
GLUCOSE BLD-MCNC: 110 MG/DL (ref 70–100)
HCT VFR BLD AUTO: 38 % (ref 34.5–44)
HGB BLD-MCNC: 12.3 G/DL (ref 11.5–15.5)
MCH RBC QN AUTO: 29 PG (ref 27–31)
MCHC RBC AUTO-ENTMCNC: 32.4 G/DL (ref 32–36)
MCV RBC AUTO: 89.6 FL (ref 80–99)
PLATELET # BLD AUTO: 355 10*3/MM3 (ref 150–450)
PMV BLD AUTO: 10.8 FL (ref 6–12)
POTASSIUM BLD-SCNC: 4.5 MMOL/L (ref 3.5–5.5)
PROT SERPL-MCNC: 5.5 G/DL (ref 5.7–8.2)
RBC # BLD AUTO: 4.24 10*6/MM3 (ref 3.89–5.14)
SODIUM BLD-SCNC: 142 MMOL/L (ref 132–146)
WBC NRBC COR # BLD: 7.65 10*3/MM3 (ref 3.5–10.8)

## 2019-03-07 PROCEDURE — G0378 HOSPITAL OBSERVATION PER HR: HCPCS

## 2019-03-07 PROCEDURE — 80053 COMPREHEN METABOLIC PANEL: CPT | Performed by: SURGERY

## 2019-03-07 PROCEDURE — 85027 COMPLETE CBC AUTOMATED: CPT | Performed by: SURGERY

## 2019-03-07 RX ORDER — PANTOPRAZOLE SODIUM 40 MG/1
40 TABLET, DELAYED RELEASE ORAL DAILY
Qty: 90 TABLET | Refills: 2 | Status: SHIPPED | OUTPATIENT
Start: 2019-03-07 | End: 2020-05-04

## 2019-03-07 RX ORDER — OXYCODONE HYDROCHLORIDE AND ACETAMINOPHEN 5; 325 MG/1; MG/1
2 TABLET ORAL EVERY 4 HOURS PRN
Qty: 17 TABLET | Refills: 0 | Status: SHIPPED | OUTPATIENT
Start: 2019-03-07 | End: 2019-03-15

## 2019-03-07 RX ORDER — PSEUDOEPHEDRINE HCL 30 MG
100 TABLET ORAL 2 TIMES DAILY
Qty: 20 EACH | Refills: 0 | Status: SHIPPED | OUTPATIENT
Start: 2019-03-07

## 2019-03-07 RX ADMIN — FLUOXETINE HYDROCHLORIDE 20 MG: 20 CAPSULE ORAL at 08:42

## 2019-03-07 RX ADMIN — DOCUSATE SODIUM 100 MG: 100 CAPSULE, LIQUID FILLED ORAL at 08:43

## 2019-03-07 RX ADMIN — SODIUM CHLORIDE, POTASSIUM CHLORIDE, SODIUM LACTATE AND CALCIUM CHLORIDE 125 ML/HR: 600; 310; 30; 20 INJECTION, SOLUTION INTRAVENOUS at 05:04

## 2019-03-07 RX ADMIN — TRIAMTERENE AND HYDROCHLOROTHIAZIDE 1 TABLET: 37.5; 25 TABLET ORAL at 08:42

## 2019-03-08 ENCOUNTER — TRANSITIONAL CARE MANAGEMENT TELEPHONE ENCOUNTER (OUTPATIENT)
Dept: FAMILY MEDICINE CLINIC | Facility: CLINIC | Age: 49
End: 2019-03-08

## 2019-03-08 NOTE — OUTREACH NOTE
COURTNEY call completed.  Please see flow sheet for additional details.  Pt reports she's doing well. Denies questions/concerns, N/V/D/C, fever/chills. Pain controlled w/ medication. Eating & drinking well. Dressing D/I.  Agrees to COURTNEY w/ [facility]. [facility] on 3/12. Confirms surgery f/u appt 4/4 & and GI f/u on 4/9.

## 2019-03-12 ENCOUNTER — OFFICE VISIT (OUTPATIENT)
Dept: FAMILY MEDICINE CLINIC | Facility: CLINIC | Age: 49
End: 2019-03-12

## 2019-03-12 VITALS
SYSTOLIC BLOOD PRESSURE: 118 MMHG | DIASTOLIC BLOOD PRESSURE: 74 MMHG | BODY MASS INDEX: 32.43 KG/M2 | HEART RATE: 90 BPM | WEIGHT: 183 LBS | TEMPERATURE: 98 F | OXYGEN SATURATION: 99 % | HEIGHT: 63 IN

## 2019-03-12 DIAGNOSIS — K26.3 PEPTIC ULCER OF DUODENUM: Primary | ICD-10-CM

## 2019-03-12 DIAGNOSIS — F41.1 ANXIETY, GENERALIZED: ICD-10-CM

## 2019-03-12 DIAGNOSIS — Z87.19 HISTORY OF CHOLELITHIASIS: ICD-10-CM

## 2019-03-12 DIAGNOSIS — I10 ESSENTIAL HYPERTENSION: ICD-10-CM

## 2019-03-12 DIAGNOSIS — K80.50 CHOLEDOCHOLITHIASIS: ICD-10-CM

## 2019-03-12 PROCEDURE — 99495 TRANSJ CARE MGMT MOD F2F 14D: CPT | Performed by: PHYSICIAN ASSISTANT

## 2019-03-12 NOTE — PROGRESS NOTES
Subjective   Randa Castro is being seen for follow-up after hospitalization at [facility].      The date of hospitalization were March 5 - March 7, 2019 (Not on file)- .     Discharge Diagnosis during hospitalization was choledocholithiasis, cholelithiasis, obstructive sleep apnea, peptic ulcer of duodenum, generalized anxiety disorder and hypertension    Hospital course: Patient was diagnosed as an outpatient with cholelithiasis following an initial evaluation for abdominal pain.  She was found to have dilated common bile duct with abnormal labs showing elevated liver functions as well.  General surgery and gastroenterology have been consulted prior to her admission to the hospital.  She had a MRCP preoperatively which was negative.  At that time laparoscopic cholecystectomy was performed.  After undergoing her operative procedure she was transferred to the floor and due to the intraoperative findings of choledocholithiasis GI was consulted.  She underwent successful ERCP on March 6, 2019.  She was also found to have a duodenal ulcer on ERCP was started on pantoprazole by GI.  Postoperatively she did well was tolerating regular diet with normal bowel function and pain was well controlled on oral pain medication, therefore she was discharged home on March 7, 2019.    Discharged to: Home    Discharged on the following medicines: See below     Discharge Medications           Accurate as of 3/12/19  4:47 PM. If you have any questions, ask your nurse or doctor.               Continue These Medications      Instructions Start Date   docusate sodium 100 MG capsule   100 mg, Oral, 2 Times Daily      FLUoxetine 20 MG capsule  Commonly known as:  PROzac   1 or 2 caps daily      loratadine-pseudoephedrine  MG per 24 hr tablet  Commonly known as:  CLARITIN-D 24-hour   1 tablet, Oral, Daily      MULTI VITAMIN DAILY PO   1 capsule, Oral, Daily      ondansetron 8 MG tablet  Commonly known as:   ZOFRAN   8 mg, Oral, Every 8 Hours PRN      oxyCODONE-acetaminophen 5-325 MG per tablet  Commonly known as:  PERCOCET   2 tablets, Oral, Every 4 Hours PRN      pantoprazole 40 MG EC tablet  Commonly known as:  PROTONIX   40 mg, Oral, Daily      triamterene-hydrochlorothiazide 37.5-25 MG per tablet  Commonly known as:  MAXZIDE-25   1 tablet, Oral, Daily      VAQTA 50 UNIT/ML injection  Generic drug:  hepatitis A vaccine   Intramuscular               Information from the hospitalization was follow-up with PCP today, follow-up with gastroenterology in 1 month and general surgery in 4 weeks.    Review of Systems   Constitutional: Positive for activity change and appetite change. Negative for chills, fever and unexpected weight change.   Respiratory: Negative for cough, chest tightness and shortness of breath.    Cardiovascular: Negative for chest pain.   Gastrointestinal: Positive for nausea. Negative for abdominal distention, abdominal pain, blood in stool, constipation, diarrhea and vomiting.   Genitourinary: Negative.  Negative for difficulty urinating and dysuria.   Musculoskeletal: Negative for back pain.   Skin: Positive for color change and wound. Negative for rash.        Steri-Strips in place over laparoscopic surgical wound sites, minimal pain at incision site at umbilicus no drainage, no redness   Allergic/Immunologic: Negative for food allergies.   Psychiatric/Behavioral: Negative.        Objective     Vitals:    03/12/19 1035   BP: 118/74   Pulse: 90   Temp: 98 °F (36.7 °C)   SpO2: 99%       Physical Exam   Constitutional: She appears well-developed and well-nourished. No distress.   Obesity noted     Eyes: No scleral icterus.   Neck: No thyromegaly present.   Cardiovascular: Normal rate and regular rhythm.   Pulmonary/Chest: Effort normal and breath sounds normal.   Abdominal: Soft. There is no tenderness.   Skin: Skin is warm and dry. No rash noted. She is not diaphoretic. No erythema. No pallor.    Examination of laparoscopic surgical wound sites, dry, no erythema, no drainage, healing well with Steri-Strips in place.   Psychiatric: She has a normal mood and affect. Her behavior is normal. Judgment and thought content normal.   Nursing note and vitals reviewed.      Assessment/Plan     Problem List Items Addressed This Visit        Cardiovascular and Mediastinum    HTN (hypertension)       Digestive    Choledocholithiasis    Overview     Added automatically from request for surgery [id number]         Peptic ulcer of duodenum - Primary       Other    Anxiety, generalized      Other Visit Diagnoses     History of cholelithiasis            Advised patient to continue on current medications including pantoprazole that she was started on postop for duodenal ulcer.  Advised patient the importance of keeping her follow-up with general surgery and with gastroenterology.  I reviewed her ERCP findings with her today as well.  Advised her to continue staying off work as recommended/instructed by her general surgeon until cleared by general surgery to return to work.  Reviewed routine wound care instructions with patient regarding postop wounds.    Transitional Care Management Certification  I certify that the following are true:  1. Communication was made within 2 business days of discharge.  2. Complexity of Medical Decision Making is moderate.  3. Face to face visit occurred within 5 days.    *Note: 83113 is for high complexity patients with a face to face visit within 7 days of discharge.  61455 is for high complexity patients with a face to face on days 8-14 post discharge or medium complexity with face to face visit within 14 days post discharge.

## 2019-03-26 ENCOUNTER — TELEPHONE (OUTPATIENT)
Dept: FAMILY MEDICINE CLINIC | Facility: CLINIC | Age: 49
End: 2019-03-26

## 2019-03-26 DIAGNOSIS — R10.13 EPIGASTRIC ABDOMINAL PAIN: ICD-10-CM

## 2019-03-26 RX ORDER — ONDANSETRON HYDROCHLORIDE 8 MG/1
8 TABLET, FILM COATED ORAL EVERY 8 HOURS PRN
Qty: 15 TABLET | Refills: 1 | Status: SHIPPED | OUTPATIENT
Start: 2019-03-26 | End: 2020-04-30

## 2019-03-26 NOTE — TELEPHONE ENCOUNTER
----- Message from Meka Garcia sent at 3/26/2019  1:33 PM EDT -----  Contact: PT  HAD GALLBLADDER REMOVED 3 WKS AGO AND STILL HAVING A LOT OF NAUSEA.  WOULD LIKE REFILL SENT IN ON ondansetron (ZOFRAN) 8 MG tablet Take 1 tablet by mouth Every 8 (Eight) Hours As Needed for Nausea or Vomiting.     [facility] [address] [place] [address] - [phone]  - [phone]  [phone] (Phone)  [phone] (Fax)

## 2019-04-02 NOTE — ANESTHESIA POSTPROCEDURE EVALUATION
Patient: Aniya Castro    Procedure Summary     Date:  03/06/19 Room / Location:   [facility]    Anesthesia Start:  1449 Anesthesia Stop:  1539    Procedure:  ENDOSCOPIC RETROGRADE CHOLANGIOPANCREATOGRAPHY (N/A ) Diagnosis:       Choledocholithiasis      (Choledocholithiasis [K80.50])    Surgeon:  Todd Campos MD Provider:  Enoch Gay MD    Anesthesia Type:  general ASA Status:  2          Anesthesia Type: general  Last vitals  BP   135/70 (03/07/19 0700)   Temp   98.5 °F (36.9 °C) (03/07/19 0700)   Pulse   83 (03/07/19 0700)   Resp   16 (03/07/19 0700)     SpO2   95 % (03/07/19 0700)     Post Anesthesia Care and Evaluation    Patient location during evaluation: PACU  Patient participation: complete - patient participated  Level of consciousness: awake and alert  Pain score: 0  Pain management: adequate  Airway patency: patent  Anesthetic complications: No anesthetic complications  PONV Status: none  Cardiovascular status: hemodynamically stable and acceptable  Respiratory status: nonlabored ventilation, acceptable and nasal cannula  Hydration status: acceptable

## 2019-04-09 ENCOUNTER — OFFICE VISIT (OUTPATIENT)
Dept: GASTROENTEROLOGY | Facility: CLINIC | Age: 49
End: 2019-04-09

## 2019-04-09 VITALS
RESPIRATION RATE: 16 BRPM | BODY MASS INDEX: 32.42 KG/M2 | TEMPERATURE: 97.8 F | DIASTOLIC BLOOD PRESSURE: 76 MMHG | HEART RATE: 78 BPM | OXYGEN SATURATION: 98 % | SYSTOLIC BLOOD PRESSURE: 118 MMHG | WEIGHT: 183 LBS

## 2019-04-09 DIAGNOSIS — K26.9 DUODENAL ULCER: Primary | ICD-10-CM

## 2019-04-09 PROCEDURE — 99213 OFFICE O/P EST LOW 20 MIN: CPT | Performed by: INTERNAL MEDICINE

## 2019-04-09 NOTE — PROGRESS NOTES
PCP: Jose Manuel Duggan MD    Chief Complaint   Patient presents with   • ERCP F/U       History of Present Illness:   Aniya Castro is a 48 y.o. female who presents to GI clinic as a follow up for duodenal ulcer and choledocholithiasis. S/p ccy. She is struggling with constipation. Has tried stool softeners. Mild epigastric achiness with nausea.     Past Medical History:   Diagnosis Date   • Anxiety, generalized    • Fatigue    • HTN (hypertension)    • Organic mood disorder of depressed type    • Skin lesion    • Sleep apnea with use of continuous positive airway pressure (CPAP)     compliant with machine    • Wears glasses    • Weight gain        Past Surgical History:   Procedure Laterality Date   • ADENOIDECTOMY     •  SECTION     • CHOLECYSTECTOMY WITH INTRAOPERATIVE CHOLANGIOGRAM N/A 3/5/2019    Procedure: CHOLECYSTECTOMY LAPAROSCOPIC WITH IOC;  Surgeon: Wali Nicole MD;  Location:  [facility];  Service: General   • EAR TUBES     • ERCP N/A 3/6/2019    Procedure: ENDOSCOPIC RETROGRADE CHOLANGIOPANCREATOGRAPHY;  Surgeon: Todd Campos MD;  Location[facility]COPY;  Service: Gastroenterology   • INTRAUTERINE DEVICE INSERTION     • WISDOM TOOTH EXTRACTION      all 4         Current Outpatient Medications:   •  docusate sodium 100 MG capsule, Take 100 mg by mouth 2 (Two) Times a Day., Disp: 20 each, Rfl: 0  •  FLUoxetine (PROzac) 20 MG capsule, 1 or 2 caps daily, Disp: 60 capsule, Rfl: 5  •  hepatitis A vaccine (VAQTA) 50 UNIT/ML injection, Inject into the appropriate muscle as directed by prescriber. Repeat in 6 months., Disp: 1 mL, Rfl: 1  •  loratadine-pseudoephedrine (CLARITIN-D 24-hour)  MG per 24 hr tablet, Take 1 tablet by mouth Daily., Disp: 15 tablet, Rfl: 0  •  Multiple Vitamin (MULTI VITAMIN DAILY PO), Take 1 capsule by mouth Daily., Disp: , Rfl:   •  ondansetron (ZOFRAN) 8 MG tablet, Take 1 tablet by mouth Every 8 (Eight) Hours As Needed for Nausea or  Vomiting., Disp: 15 tablet, Rfl: 1  •  pantoprazole (PROTONIX) 40 MG EC tablet, Take 1 tablet by mouth Daily., Disp: 90 tablet, Rfl: 3  •  triamterene-hydrochlorothiazide (MAXZIDE-25) 37.5-25 MG per tablet, Take 1 tablet by mouth Daily., Disp: 30 tablet, Rfl: 5    Allergies   Allergen Reactions   • Sulfabenzamide Hives     generalized       Family History   Problem Relation Age of Onset   • Hypertension Mother    • Heart disease Father    • Stroke Other    • Arthritis Other    • Diabetes Other    • Breast cancer Neg Hx    • Ovarian cancer Neg Hx        Social History     Socioeconomic History   • Marital status:      Spouse name: Not on file   • Number of children: Not on file   • Years of education: Not on file   • Highest education level: Not on file   Tobacco Use   • Smoking status: Never Smoker   • Smokeless tobacco: Never Used   Substance and Sexual Activity   • Alcohol use: No     Comment: occcasional   • Drug use: No   • Sexual activity: Defer       Review of Systems   Constitutional: Negative.    HENT: Negative.    Eyes: Negative.    Respiratory: Negative.    Cardiovascular: Negative.    Gastrointestinal: Negative.    Endocrine: Negative.    Genitourinary: Negative.    Musculoskeletal: Negative.    Skin: Negative.    Allergic/Immunologic: Negative.    Neurological: Negative.    Hematological: Negative.    Psychiatric/Behavioral: Negative.          There were no vitals filed for this visit.    Physical Exam  General Appearance:  Vitals as above. no acute distress  Head/face:  Normocephalic, atraumatic  Eyes:   EOMI, no conjunctivitis or icterus   Nose/Sinuses:  Nares patent bilaterally without discharge or lesions  Mouth/Throat:  Posterior pharynx is pink without drainage or exudates;  dentition is in good condition and repair  Neck:  trachea is midline, no thyromegaly  Neurologic:  Alert; no focal deficits; age appropriate behavior and speech  Psychiatric: mood and affect are congruent  Skin: no rash  or cyanosis.  Abdomen: obese and soft/nt      Assessment/Plan  1.) Constipation  Tried: OTC stool softeners and miralax  Will give samples of linzess to assess response    2.) PUD, duodenal ulcer with d1 stricture  Assess h. Pylori stool antigen.  Discussed endoscopy to take a look but we decided to avoid this for now. Continue PPI. Denies wt loss or daily nausea    rtc in 1 year.        Todd Campos MD  4/9/2019

## 2019-04-26 ENCOUNTER — TELEPHONE (OUTPATIENT)
Dept: GASTROENTEROLOGY | Facility: CLINIC | Age: 49
End: 2019-04-26

## 2019-05-01 ENCOUNTER — APPOINTMENT (OUTPATIENT)
Dept: LAB | Facility: HOSPITAL | Age: 49
End: 2019-05-01

## 2019-05-01 PROCEDURE — 87338 HPYLORI STOOL AG IA: CPT | Performed by: INTERNAL MEDICINE

## 2019-05-03 LAB — H PYLORI AG STL QL IA: NEGATIVE

## 2019-05-17 DIAGNOSIS — R53.83 FATIGUE, UNSPECIFIED TYPE: Primary | ICD-10-CM

## 2019-05-21 ENCOUNTER — LAB (OUTPATIENT)
Dept: LAB | Facility: HOSPITAL | Age: 49
End: 2019-05-21

## 2019-05-21 DIAGNOSIS — R53.83 FATIGUE, UNSPECIFIED TYPE: ICD-10-CM

## 2019-05-21 LAB
25(OH)D3 SERPL-MCNC: 57.9 NG/ML (ref 30–100)
ALBUMIN SERPL-MCNC: 4.4 G/DL (ref 3.5–5.2)
ALBUMIN/GLOB SERPL: 1.6 G/DL
ALP SERPL-CCNC: 63 U/L (ref 39–117)
ALT SERPL W P-5'-P-CCNC: 13 U/L (ref 1–33)
ANION GAP SERPL CALCULATED.3IONS-SCNC: 11 MMOL/L
AST SERPL-CCNC: 14 U/L (ref 1–32)
BASOPHILS # BLD AUTO: 0.04 10*3/MM3 (ref 0–0.2)
BASOPHILS NFR BLD AUTO: 0.5 % (ref 0–1.5)
BILIRUB SERPL-MCNC: 0.4 MG/DL (ref 0.2–1.2)
BUN BLD-MCNC: 13 MG/DL (ref 6–20)
BUN/CREAT SERPL: 15.9 (ref 7–25)
CALCIUM SPEC-SCNC: 9 MG/DL (ref 8.6–10.5)
CHLORIDE SERPL-SCNC: 103 MMOL/L (ref 98–107)
CO2 SERPL-SCNC: 29 MMOL/L (ref 22–29)
CREAT BLD-MCNC: 0.82 MG/DL (ref 0.57–1)
DEPRECATED RDW RBC AUTO: 43.9 FL (ref 37–54)
EOSINOPHIL # BLD AUTO: 0.46 10*3/MM3 (ref 0–0.4)
EOSINOPHIL NFR BLD AUTO: 6.1 % (ref 0.3–6.2)
ERYTHROCYTE [DISTWIDTH] IN BLOOD BY AUTOMATED COUNT: 13.4 % (ref 12.3–15.4)
FERRITIN SERPL-MCNC: 196 NG/ML (ref 13–150)
GFR SERPL CREATININE-BSD FRML MDRD: 75 ML/MIN/1.73
GLOBULIN UR ELPH-MCNC: 2.7 GM/DL
GLUCOSE BLD-MCNC: 80 MG/DL (ref 65–99)
HCT VFR BLD AUTO: 40.7 % (ref 34–46.6)
HGB BLD-MCNC: 13.3 G/DL (ref 12–15.9)
IMM GRANULOCYTES # BLD AUTO: 0.02 10*3/MM3 (ref 0–0.05)
IMM GRANULOCYTES NFR BLD AUTO: 0.3 % (ref 0–0.5)
IRON 24H UR-MRATE: 73 MCG/DL (ref 37–145)
IRON SATN MFR SERPL: 20 % (ref 20–50)
LYMPHOCYTES # BLD AUTO: 2.22 10*3/MM3 (ref 0.7–3.1)
LYMPHOCYTES NFR BLD AUTO: 29.6 % (ref 19.6–45.3)
MCH RBC QN AUTO: 29.2 PG (ref 26.6–33)
MCHC RBC AUTO-ENTMCNC: 32.7 G/DL (ref 31.5–35.7)
MCV RBC AUTO: 89.3 FL (ref 79–97)
MONOCYTES # BLD AUTO: 0.51 10*3/MM3 (ref 0.1–0.9)
MONOCYTES NFR BLD AUTO: 6.8 % (ref 5–12)
NEUTROPHILS # BLD AUTO: 4.28 10*3/MM3 (ref 1.7–7)
NEUTROPHILS NFR BLD AUTO: 57 % (ref 42.7–76)
PLATELET # BLD AUTO: 351 10*3/MM3 (ref 140–450)
PMV BLD AUTO: 11.2 FL (ref 6–12)
POTASSIUM BLD-SCNC: 3.3 MMOL/L (ref 3.5–5.2)
PROT SERPL-MCNC: 7.1 G/DL (ref 6–8.5)
RBC # BLD AUTO: 4.56 10*6/MM3 (ref 3.77–5.28)
SODIUM BLD-SCNC: 143 MMOL/L (ref 136–145)
TIBC SERPL-MCNC: 371 MCG/DL (ref 298–536)
TRANSFERRIN SERPL-MCNC: 249 MG/DL (ref 200–360)
TSH SERPL DL<=0.05 MIU/L-ACNC: 1.9 MIU/ML (ref 0.27–4.2)
VIT B12 BLD-MCNC: 1020 PG/ML (ref 211–946)
WBC NRBC COR # BLD: 7.51 10*3/MM3 (ref 3.4–10.8)

## 2019-05-21 PROCEDURE — 82306 VITAMIN D 25 HYDROXY: CPT

## 2019-05-21 PROCEDURE — 82607 VITAMIN B-12: CPT

## 2019-05-21 PROCEDURE — 82728 ASSAY OF FERRITIN: CPT

## 2019-05-21 PROCEDURE — 36415 COLL VENOUS BLD VENIPUNCTURE: CPT

## 2019-05-21 PROCEDURE — 84466 ASSAY OF TRANSFERRIN: CPT

## 2019-05-21 PROCEDURE — 85025 COMPLETE CBC W/AUTO DIFF WBC: CPT

## 2019-05-21 PROCEDURE — 80053 COMPREHEN METABOLIC PANEL: CPT

## 2019-05-21 PROCEDURE — 83540 ASSAY OF IRON: CPT

## 2019-05-21 PROCEDURE — 84443 ASSAY THYROID STIM HORMONE: CPT

## 2019-08-26 RX ORDER — FLUOXETINE HYDROCHLORIDE 20 MG/1
CAPSULE ORAL
Qty: 60 CAPSULE | Refills: 5 | Status: SHIPPED | OUTPATIENT
Start: 2019-08-26 | End: 2020-04-01 | Stop reason: SDUPTHER

## 2019-08-26 RX ORDER — TRIAMTERENE AND HYDROCHLOROTHIAZIDE 37.5; 25 MG/1; MG/1
1 TABLET ORAL DAILY
Qty: 30 TABLET | Refills: 5 | Status: SHIPPED | OUTPATIENT
Start: 2019-08-26 | End: 2020-04-01 | Stop reason: SDUPTHER

## 2019-09-13 ENCOUNTER — LAB (OUTPATIENT)
Dept: LAB | Facility: HOSPITAL | Age: 49
End: 2019-09-13

## 2019-09-13 DIAGNOSIS — R50.9 FEVER, UNSPECIFIED FEVER CAUSE: Primary | ICD-10-CM

## 2019-09-13 DIAGNOSIS — R50.9 FEVER, UNSPECIFIED FEVER CAUSE: ICD-10-CM

## 2019-09-13 LAB — S PYO AG THROAT QL: POSITIVE

## 2019-09-13 PROCEDURE — 87880 STREP A ASSAY W/OPTIC: CPT

## 2019-09-13 RX ORDER — AMOXICILLIN 875 MG/1
875 TABLET, COATED ORAL 2 TIMES DAILY
Qty: 20 TABLET | Refills: 0 | Status: SHIPPED | OUTPATIENT
Start: 2019-09-13 | End: 2020-03-30

## 2019-09-18 RX ORDER — FLUCONAZOLE 150 MG/1
150 TABLET ORAL DAILY
Qty: 2 TABLET | Refills: 0 | Status: SHIPPED | OUTPATIENT
Start: 2019-09-18 | End: 2020-03-30

## 2020-01-24 ENCOUNTER — TRANSCRIBE ORDERS (OUTPATIENT)
Dept: ADMINISTRATIVE | Facility: HOSPITAL | Age: 50
End: 2020-01-24

## 2020-01-24 DIAGNOSIS — Z12.31 VISIT FOR SCREENING MAMMOGRAM: Primary | ICD-10-CM

## 2020-03-17 ENCOUNTER — APPOINTMENT (OUTPATIENT)
Dept: MAMMOGRAPHY | Facility: HOSPITAL | Age: 50
End: 2020-03-17

## 2020-03-30 ENCOUNTER — OFFICE VISIT (OUTPATIENT)
Dept: NEUROLOGY | Facility: CLINIC | Age: 50
End: 2020-03-30

## 2020-03-30 VITALS
WEIGHT: 222 LBS | HEIGHT: 63 IN | SYSTOLIC BLOOD PRESSURE: 120 MMHG | BODY MASS INDEX: 39.34 KG/M2 | HEART RATE: 88 BPM | DIASTOLIC BLOOD PRESSURE: 86 MMHG | OXYGEN SATURATION: 98 %

## 2020-03-30 DIAGNOSIS — G47.33 OBSTRUCTIVE SLEEP APNEA: Primary | ICD-10-CM

## 2020-03-30 PROCEDURE — 99212 OFFICE O/P EST SF 10 MIN: CPT | Performed by: PSYCHIATRY & NEUROLOGY

## 2020-03-30 NOTE — PROGRESS NOTES
Subjective   Patient ID: Aniya Castro is a 49 y.o. female     Chief Complaint   Patient presents with   • Obstructive Sleep Apnea        History of Present Illness    49 y.o. female returns for follow up of ROLF.  Last visit on 1/17/19 ordered autopap 8 - 18 cm H20.      Home sleep study - AHI 39.8     Compliant with CPAP and receiving benefit   Gained 40 # in last year.      Avg usage 7 hrs 59 min  AHI 0.21  Avg usage 95 th percentile 12.3 cm H20     Problem history:    Continued loud snoring, witnessed apnea, excessive daytime sleepiness.  Does have large tonsils.      Problem history:    Pulse oximetry overnight suggested ROLF.    Goes to bed at 10 pm awakens at 5 am.  Denies napping.  Excessive sleepiness when sitting quietly.  Falls asleep in 2 minutes.  Does not frequently awaken.  Denies RLS or leg kicks.      Sleep improved with 20 lb wt loss starting in July.       Reviewed medical records:     Pt reports loud snoring, witnessed apnea and EDS.  Pt has lost 20 lbs.  BP improving.      Labs - A1C 6.2, ,     Past Medical History:   Diagnosis Date   • Anxiety, generalized    • Fatigue    • HTN (hypertension)    • Organic mood disorder of depressed type    • Skin lesion    • Sleep apnea with use of continuous positive airway pressure (CPAP)     compliant with machine    • Wears glasses    • Weight gain      Family History   Problem Relation Age of Onset   • Hypertension Mother    • Heart disease Father    • Stroke Other    • Arthritis Other    • Diabetes Other    • Breast cancer Neg Hx    • Ovarian cancer Neg Hx      Social History     Socioeconomic History   • Marital status:      Spouse name: Not on file   • Number of children: Not on file   • Years of education: Not on file   • Highest education level: Not on file   Tobacco Use   • Smoking status: Never Smoker   • Smokeless tobacco: Never Used   Substance and Sexual Activity   • Alcohol use: No     Comment: occcasional   • Drug use: No  "  • Sexual activity: Defer       Review of Systems   Constitutional: Positive for fatigue. Negative for activity change and unexpected weight change.   HENT: Negative for tinnitus and trouble swallowing.    Eyes: Negative for photophobia and visual disturbance.   Respiratory: Negative for apnea, cough and choking.    Cardiovascular: Negative for leg swelling.   Gastrointestinal: Negative for nausea and vomiting.   Endocrine: Negative for cold intolerance and heat intolerance.   Genitourinary: Negative for difficulty urinating, frequency, menstrual problem and urgency.   Musculoskeletal: Negative for back pain, gait problem, myalgias and neck pain.   Skin: Negative for color change and rash.   Allergic/Immunologic: Negative for immunocompromised state.   Neurological: Negative for dizziness, tremors, seizures, syncope, facial asymmetry, speech difficulty, weakness, light-headedness, numbness and headaches.   Hematological: Negative for adenopathy. Does not bruise/bleed easily.   Psychiatric/Behavioral: Positive for sleep disturbance. Negative for behavioral problems, confusion, decreased concentration and hallucinations.       Objective     Vitals:    03/30/20 1549   BP: 120/86   Pulse: 88   SpO2: 98%   Weight: 101 kg (222 lb)   Height: 160 cm (63\")       Neurologic Exam     Mental Status   Registration: recalls 3 of 3 objects. Recall at 5 minutes: recalls 3 of 3 objects. Follows 3 step commands.   Attention: normal. Concentration: normal.   Level of consciousness: alert  Knowledge: good and consistent with education.   Able to name object. Able to read. Able to repeat. Able to write. Normal comprehension.     Cranial Nerves     CN II   Visual fields full to confrontation.   Visual acuity: normal  Right visual field deficit: none  Left visual field deficit: none     CN III, IV, VI   Right pupil: Shape: regular. Reactivity: brisk. Consensual response: intact.   Left pupil: Shape: regular. Reactivity: brisk. Consensual " response: intact.   Nystagmus: none   Diplopia: none  Ophthalmoparesis: none  Upgaze: normal  Downgaze: normal  Conjugate gaze: present  Vestibulo-ocular reflex: present    CN V   Facial sensation intact.   Right corneal reflex: normal  Left corneal reflex: normal    CN VII   Right facial weakness: none  Left facial weakness: none    CN VIII   Hearing: intact    CN IX, X   Palate: symmetric  Right gag reflex: normal  Left gag reflex: normal    CN XI   Right sternocleidomastoid strength: normal  Left sternocleidomastoid strength: normal    CN XII   Tongue: not atrophic  Fasciculations: absent  Tongue deviation: none    Motor Exam   Muscle bulk: normal  Overall muscle tone: normal  Right arm tone: normal  Left arm tone: normal  Right leg tone: normal  Left leg tone: normal    Sensory Exam   Light touch normal.   Vibration normal.   Proprioception normal.   Pinprick normal.     Gait, Coordination, and Reflexes     Tremor   Resting tremor: absent  Intention tremor: absent  Action tremor: absent    Reflexes   Reflexes 2+ except as noted.       Physical Exam   Constitutional: She appears well-developed and well-nourished.   Nursing note and vitals reviewed.      Lab on 09/13/2019   Component Date Value Ref Range Status   • Strep A Ag 09/13/2019 Positive* Negative Final         Assessment/Plan     Problem List Items Addressed This Visit        Respiratory    Obstructive sleep apnea - Primary    Current Assessment & Plan     Continue autop 8 - 20 cm H20    Compliant with CPAP and receiving benefit.                   No follow-ups on file.

## 2020-04-01 RX ORDER — TRIAMTERENE AND HYDROCHLOROTHIAZIDE 37.5; 25 MG/1; MG/1
1 TABLET ORAL DAILY
Qty: 30 TABLET | Refills: 5 | Status: SHIPPED | OUTPATIENT
Start: 2020-04-01 | End: 2020-09-08 | Stop reason: SDUPTHER

## 2020-04-01 RX ORDER — FLUOXETINE HYDROCHLORIDE 20 MG/1
20-40 CAPSULE ORAL DAILY
Qty: 60 CAPSULE | Refills: 5 | Status: SHIPPED | OUTPATIENT
Start: 2020-04-01 | End: 2020-09-08 | Stop reason: SDUPTHER

## 2020-04-06 ENCOUNTER — APPOINTMENT (OUTPATIENT)
Dept: MAMMOGRAPHY | Facility: HOSPITAL | Age: 50
End: 2020-04-06

## 2020-04-07 ENCOUNTER — LAB (OUTPATIENT)
Dept: LAB | Facility: HOSPITAL | Age: 50
End: 2020-04-07

## 2020-04-07 DIAGNOSIS — R63.5 ABNORMAL WEIGHT GAIN: Primary | ICD-10-CM

## 2020-04-07 LAB
ALBUMIN SERPL-MCNC: 4.2 G/DL (ref 3.5–5.2)
ALBUMIN/GLOB SERPL: 1.4 G/DL
ALP SERPL-CCNC: 77 U/L (ref 39–117)
ALT SERPL W P-5'-P-CCNC: 22 U/L (ref 1–33)
ANION GAP SERPL CALCULATED.3IONS-SCNC: 12.9 MMOL/L (ref 5–15)
AST SERPL-CCNC: 15 U/L (ref 1–32)
BASOPHILS # BLD AUTO: 0.1 10*3/MM3 (ref 0–0.2)
BASOPHILS NFR BLD AUTO: 1 % (ref 0–1.5)
BILIRUB SERPL-MCNC: 0.3 MG/DL (ref 0.2–1.2)
BUN BLD-MCNC: 10 MG/DL (ref 6–20)
BUN/CREAT SERPL: 13.5 (ref 7–25)
CALCIUM SPEC-SCNC: 9.1 MG/DL (ref 8.6–10.5)
CHLORIDE SERPL-SCNC: 99 MMOL/L (ref 98–107)
CHOLEST SERPL-MCNC: 204 MG/DL (ref 0–200)
CO2 SERPL-SCNC: 25.1 MMOL/L (ref 22–29)
CREAT BLD-MCNC: 0.74 MG/DL (ref 0.57–1)
DEPRECATED RDW RBC AUTO: 41.3 FL (ref 37–54)
EOSINOPHIL # BLD AUTO: 0.42 10*3/MM3 (ref 0–0.4)
EOSINOPHIL NFR BLD AUTO: 4.1 % (ref 0.3–6.2)
ERYTHROCYTE [DISTWIDTH] IN BLOOD BY AUTOMATED COUNT: 13.3 % (ref 12.3–15.4)
GFR SERPL CREATININE-BSD FRML MDRD: 84 ML/MIN/1.73
GLOBULIN UR ELPH-MCNC: 3 GM/DL
GLUCOSE BLD-MCNC: 102 MG/DL (ref 65–99)
HBA1C MFR BLD: 6.1 % (ref 4.8–5.6)
HCT VFR BLD AUTO: 41.2 % (ref 34–46.6)
HDLC SERPL-MCNC: 35 MG/DL (ref 40–60)
HGB BLD-MCNC: 14.2 G/DL (ref 12–15.9)
IMM GRANULOCYTES # BLD AUTO: 0.07 10*3/MM3 (ref 0–0.05)
IMM GRANULOCYTES NFR BLD AUTO: 0.7 % (ref 0–0.5)
LDLC SERPL CALC-MCNC: 107 MG/DL (ref 0–100)
LDLC/HDLC SERPL: 3.05 {RATIO}
LYMPHOCYTES # BLD AUTO: 3.05 10*3/MM3 (ref 0.7–3.1)
LYMPHOCYTES NFR BLD AUTO: 29.5 % (ref 19.6–45.3)
MCH RBC QN AUTO: 29.6 PG (ref 26.6–33)
MCHC RBC AUTO-ENTMCNC: 34.5 G/DL (ref 31.5–35.7)
MCV RBC AUTO: 85.8 FL (ref 79–97)
MONOCYTES # BLD AUTO: 0.84 10*3/MM3 (ref 0.1–0.9)
MONOCYTES NFR BLD AUTO: 8.1 % (ref 5–12)
NEUTROPHILS # BLD AUTO: 5.87 10*3/MM3 (ref 1.7–7)
NEUTROPHILS NFR BLD AUTO: 56.6 % (ref 42.7–76)
NRBC BLD AUTO-RTO: 0 /100 WBC (ref 0–0.2)
PLATELET # BLD AUTO: 404 10*3/MM3 (ref 140–450)
PMV BLD AUTO: 11.3 FL (ref 6–12)
POTASSIUM BLD-SCNC: 3.8 MMOL/L (ref 3.5–5.2)
PROT SERPL-MCNC: 7.2 G/DL (ref 6–8.5)
RBC # BLD AUTO: 4.8 10*6/MM3 (ref 3.77–5.28)
SODIUM BLD-SCNC: 137 MMOL/L (ref 136–145)
TRIGL SERPL-MCNC: 311 MG/DL (ref 0–150)
TSH SERPL DL<=0.05 MIU/L-ACNC: 1.36 UIU/ML (ref 0.27–4.2)
VLDLC SERPL-MCNC: 62.2 MG/DL (ref 5–40)
WBC NRBC COR # BLD: 10.35 10*3/MM3 (ref 3.4–10.8)

## 2020-04-07 PROCEDURE — 36415 COLL VENOUS BLD VENIPUNCTURE: CPT

## 2020-04-07 PROCEDURE — 83036 HEMOGLOBIN GLYCOSYLATED A1C: CPT

## 2020-04-07 PROCEDURE — 85025 COMPLETE CBC W/AUTO DIFF WBC: CPT

## 2020-04-07 PROCEDURE — 80061 LIPID PANEL: CPT

## 2020-04-07 PROCEDURE — 80053 COMPREHEN METABOLIC PANEL: CPT

## 2020-04-07 PROCEDURE — 84443 ASSAY THYROID STIM HORMONE: CPT

## 2020-04-30 ENCOUNTER — TELEMEDICINE (OUTPATIENT)
Dept: BARIATRICS/WEIGHT MGMT | Facility: CLINIC | Age: 50
End: 2020-04-30

## 2020-04-30 VITALS — HEIGHT: 63 IN | BODY MASS INDEX: 40.04 KG/M2 | WEIGHT: 226 LBS

## 2020-04-30 DIAGNOSIS — E66.01 OBESITY, CLASS III, BMI 40-49.9 (MORBID OBESITY) (HCC): ICD-10-CM

## 2020-04-30 DIAGNOSIS — I10 ESSENTIAL HYPERTENSION: ICD-10-CM

## 2020-04-30 DIAGNOSIS — R12 HEARTBURN: ICD-10-CM

## 2020-04-30 DIAGNOSIS — G47.30 SLEEP APNEA WITH USE OF CONTINUOUS POSITIVE AIRWAY PRESSURE (CPAP): Primary | ICD-10-CM

## 2020-04-30 DIAGNOSIS — K26.9 DUODENAL ULCER: ICD-10-CM

## 2020-04-30 DIAGNOSIS — R73.03 PREDIABETES: ICD-10-CM

## 2020-04-30 PROCEDURE — 99204 OFFICE O/P NEW MOD 45 MIN: CPT | Performed by: PHYSICIAN ASSISTANT

## 2020-04-30 NOTE — PROGRESS NOTES
[facility] BARIATRIC SURGERY  [address]  [address]  [facility] [address]  [phone]      Patient  Name:  Aniya Castro  :  1970      Date of Visit: 2020      Chief Complaint:  weight gain; unable to maintain weight loss    History of Present Illness:  Aniya Castro is a 49 y.o. female who presents today for evaluation, education and consultation regarding metabolic and bariatric surgery. The patient is interested in sleeve gastrectomy with Dr. Rao.     Aniya has been overweight for at least 35 years, has been 35 pounds or more overweight for at least 20 years, and started dieting at age 11.  Previous diet attempts include: Calorie Counting.  The most weight Aniya lost was 35 pounds but was unable to maintain that weight loss.  Her maximum lifetime weight is 230 pounds.    As above, patient has been overweight for many years, with numerous unsuccessful dietary/weight loss attempts.  She now has obesity related comorbidities and as such has decided to pursue metabolic and bariatric surgery.  All past medical, surgical, social and family history have been obtained and discussed today, as pertinent to bariatric surgery.     Past Medical History:   Diagnosis Date   • Anesthesia complication     hard time waking   • Anxiety, generalized    • Duodenal ulcer     dx during ERCP 3/2019, no bx, HPSA (-), continues on daily Protonix since   • Dyspepsia    • Dyspnea on exertion    • Fatigue    • Heartburn     controlled w/ daily Protonix   • HTN (hypertension)    • Hyperlipidemia     borderline, no meds   • Morbid obesity (CMS/HCC)    • Organic mood disorder of depressed type    • Prediabetes     A1C 6.1   • Sleep apnea with use of continuous positive airway pressure (CPAP)     compliant with machine    • Wears glasses      Past Surgical History:   Procedure Laterality Date   • ADENOIDECTOMY     •  SECTION     • CHOLECYSTECTOMY WITH  INTRAOPERATIVE CHOLANGIOGRAM N/A 3/5/2019    Procedure: CHOLECYSTECTOMY LAPAROSCOPIC WITH IOC;  Surgeon: Wali Nicole MD;  [facility];  Service: General   • EAR TUBES  1979   • ERCP N/A 3/6/2019    Procedure: ENDOSCOPIC RETROGRADE CHOLANGIOPANCREATOGRAPHY;  Surgeon: Todd Campos MD;  [facility];  Service: Gastroenterology   • INTRAUTERINE DEVICE INSERTION  2015    due for removal   • WISDOM TOOTH EXTRACTION  1987       Allergies   Allergen Reactions   • Sulfabenzamide Hives     generalized   • Sulfa Antibiotics Hives       Current Outpatient Medications:   •  docusate sodium 100 MG capsule, Take 100 mg by mouth 2 (Two) Times a Day., Disp: 20 each, Rfl: 0  •  FLUoxetine (PROzac) 20 MG capsule, Take 1-2 capsules by mouth Daily., Disp: 60 capsule, Rfl: 5  •  loratadine-pseudoephedrine (CLARITIN-D 24-hour)  MG per 24 hr tablet, Take 1 tablet by mouth Daily., Disp: 15 tablet, Rfl: 0  •  Multiple Vitamin (MULTI VITAMIN DAILY PO), Take 1 capsule by mouth Daily., Disp: , Rfl:   •  pantoprazole (PROTONIX) 40 MG EC tablet, Take 1 tablet by mouth Daily., Disp: 90 tablet, Rfl: 2  •  triamterene-hydrochlorothiazide (MAXZIDE-25) 37.5-25 MG per tablet, Take 1 tablet by mouth Daily., Disp: 30 tablet, Rfl: 5    Social History     Socioeconomic History   • Marital status:      Spouse name: Not on file   • Number of children: Not on file   • Years of education: Not on file   • Highest education level: Not on file   Tobacco Use   • Smoking status: Never Smoker   • Smokeless tobacco: Never Used   Substance and Sexual Activity   • Alcohol use: Not Currently   • Drug use: No   • Sexual activity: Defer   Social History Narrative    Lives in [place], [place] but moving to [facility] w/ her boyfriend.  She has 3 children aged: 29, 27, and 26.  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       Family History   Problem Relation Age of Onset   •  Hypertension Mother    • Obesity Mother    • Heart disease Father    • Heart attack Father    • Stroke Maternal Grandmother    • Hypertension Maternal Grandmother    • Diabetes Maternal Grandmother    • Diabetes Maternal Grandfather    • Hypertension Maternal Grandfather    • Stroke Maternal Grandfather    • Hypertension Paternal Grandmother    • Heart attack Paternal Grandmother    • Hypertension Paternal Grandfather    • Breast cancer Neg Hx    • Ovarian cancer Neg Hx        Review of Systems:  Constitutional:  reports fatigue, weight gain and denies fevers, chills.  HEENT:  denies headache, ear pain or loss of hearing, blurred or double vision, nasal discharge or sore throat.  Cardiovascular:  reports HTN and denies hx heart disease, chest pain, palpitations, hx DVT.  Respiratory:  reports sleep apnea and denies cough , wheezing, asthma, hx PE.  Gastrointestinal:  reports heartburn and denies dysphagia, nausea, vomiting, abdominal pain, IBS, liver disease.  Genitourinary:  denies history of  frequent UTI, incontinence, hematuria, dysuria, polyuria, renal insufficiency.    Musculoskeletal: denies joint pain, fibromyalgia, arthritis and autoimmune disease.  Neurological:  denies migraines, numbness /tingling, dizziness, confusion, seizure.  Psychiatric:  reports depressed mood, hx depression, feeling anxious, hx anxiety and denies bipolar disorder.  Endocrine:  reports glucose intolerance and denies diabetes, thyroid disease.  Hematologic:  denies bruising, bleeding disorder, hx anemia, hx blood transfusion.  Skin: denies rashes, hx MRSA.    Physical Exam:  Vital Signs:  Weight: 103 kg (226 lb)   Body mass index is 40.03 kg/m².              Note: height & weight patient reported.  Limited exam d/t virtual visit during COVID pandemic    Physical Exam   Constitutional: She is oriented to person, place, and time. She appears well-developed and well-nourished. No distress.   Eyes: No scleral icterus.   Pulmonary/Chest:  Effort normal.   Neurological: She is alert and oriented to person, place, and time.   Psychiatric: She has a normal mood and affect.         Assessment:    Aniya Castro is a 49 y.o. female with medically complicated obesity pursuing sleeve gastrectomy.    Patient Active Problem List   Diagnosis   • Organic mood disorder of depressed type   • HTN (hypertension)   • Anxiety, generalized   • Prediabetes   • Hyperlipidemia   • Fatigue   • Dyspepsia   • Dyspnea on exertion   • Morbid obesity (CMS/HCC)   • Sleep apnea with use of continuous positive airway pressure (CPAP)   • Anesthesia complication   • Heartburn   • Duodenal ulcer       Metabolic and bariatric surgery is deemed medically necessary given the following obesity related comorbidities including sleep apnea, hypertension and prediabetes with current Weight: 103 kg (226 lb) and Body mass index is 40.03 kg/m².    Plan:  Patient understands that bariatric surgery is not cosmetic surgery but rather a tool to help make a lifelong commitment to lifestyle changes including diet, exercise, behavior modifications, and healthy habits.  The patient has been educated today on those expected postoperative lifestyle changes.  Psychological and Nutritional consultations will be arranged prior to surgery.  Instructions on how to access [employer] (an internet based site w/ educational surgical videos) were given to the patient.  Recommended vitamin supplementation was reviewed.  The importance of avoiding ASA/ NSAIDS/ steroids/ tobacco/ hormones/ immunomodulators perioperatively was discussed in detail.  All questions/concerns have been addressed.      Further evaluation will include: CBC, CMP, Lipids, TSH, HgA1C, H.Pylori Stool, EKG, CXR and EGD.    Additional clearances needed prior to surgery will include: Cardiology.       Further input to follow pending the above.          Note: This was an audio and video enabled telemedicine encounter to comply with social  distancing guidelines during the COVID-19 pandemic.  Consent was obtained prior to the visit.         SD Kunz

## 2020-05-06 ENCOUNTER — TELEMEDICINE (OUTPATIENT)
Dept: CARDIOLOGY | Facility: CLINIC | Age: 50
End: 2020-05-06

## 2020-05-06 VITALS
WEIGHT: 226 LBS | HEIGHT: 63 IN | DIASTOLIC BLOOD PRESSURE: 86 MMHG | SYSTOLIC BLOOD PRESSURE: 134 MMHG | BODY MASS INDEX: 40.04 KG/M2

## 2020-05-06 DIAGNOSIS — E66.01 MORBID OBESITY (HCC): ICD-10-CM

## 2020-05-06 DIAGNOSIS — I10 ESSENTIAL HYPERTENSION: Primary | ICD-10-CM

## 2020-05-06 DIAGNOSIS — E78.2 MIXED HYPERLIPIDEMIA: ICD-10-CM

## 2020-05-06 PROCEDURE — 99203 OFFICE O/P NEW LOW 30 MIN: CPT | Performed by: INTERNAL MEDICINE

## 2020-05-06 RX ORDER — PANTOPRAZOLE SODIUM 40 MG/1
40 TABLET, DELAYED RELEASE ORAL DAILY
COMMUNITY
End: 2020-08-25 | Stop reason: SDUPTHER

## 2020-05-06 NOTE — PROGRESS NOTES
[facility] at [facility]  Consultation H&P  Aniya Castro  1970    [phone] (work).    VISIT DATE:  05/06/2020    This patient has consented to a telehealth visit via video. The visit was scheduled as a consult visit to comply with patient safety concerns in accordance with CDC recommendations.  All vitals recorded within this visit are reported by the patient.  I spent 30 minutes in total including but not limited to the 15 minutes spent in direct conversation with this patient.     PCP: Jose Manuel Duggan MD  [address]  [place] [id number]    CC:  Chief Complaint   Patient presents with   • Hypertension       ASSESSMENT:   Diagnosis Plan   1. Essential hypertension     2. Mixed hyperlipidemia     3. Morbid obesity (CMS/[facility])         PLAN:  Overall low risk for major perioperative cardiovascular complications.  Will have repeat twelve-lead EKG at primary care physician's office, as long as this is stable compared to previous, no further cardiac evaluation or medication titration will be indicated prior to surgery.    History of Present Illness   49-year-old morbidly obese female with a history of hypertension dyslipidemia and considered for bariatric surgery with sleeve gastrectomy.  No previous cardiac history.  She reports that her blood pressures run less than 135/80 mmHg.  She is compliant with medical therapy.  I reviewed most recent fasting lipid panel which was remarkable for mildly depressed HDL and moderate hypertriglyceridemia and reasonable LDL level.  No family history of early coronary disease.  Non-smoker.  Hemoglobin A1c consistent with prediabetes.  She reports preserved functional capacity, is able walk up 3 flights of stairs and at least 1 block without limiting dyspnea or exertional chest discomfort.  She denies palpitations, PND orthopnea, presyncope and syncope.  Reviewed most recent twelve-lead EKG, repeat pending at primary care  "provider's office tomorrow.  History of obstructive sleep apnea, compliant with CPAP.  Previous cardiopulmonary exams have been normal PCP evaluations.    PHYSICAL EXAMINATION:  Vitals:    05/06/20 1440   BP: 134/86   BP Location: Left arm   Patient Position: Sitting   Weight: 103 kg (226 lb)   Height: 160 cm (63\")     General Appearance:    Alert, cooperative, no distress, appears stated age   Head:    Normocephalic, without obvious abnormality, atraumatic   Eyes:    conjunctiva/corneas clear,       Ears:       Nose:   Nares normal, septum midline,    Throat:   Lips normal   Neck:   trachea midline        Back:      Lungs:      Chest Wall:      Heart:     Abdomen:      Extremities:   Extremities normal, atraumatic, no cyanosis or edema   Pulses:      Skin:   Skin color normal, no rashes or lesions   Lymph nodes:      Neurologic:         Diagnostic Data:  Procedures  Lab Results   Component Value Date    CHLPL 218 (H) 07/16/2014    TRIG 311 (H) 04/07/2020    HDL 35 (L) 04/07/2020     Lab Results   Component Value Date    GLUCOSE 102 (H) 04/07/2020    BUN 10 04/07/2020    CREATININE 0.74 04/07/2020     04/07/2020    K 3.8 04/07/2020    CL 99 04/07/2020    CO2 25.1 04/07/2020     Lab Results   Component Value Date    HGBA1C 6.10 (H) 04/07/2020     Lab Results   Component Value Date    WBC 10.35 04/07/2020    HGB 14.2 04/07/2020    HCT 41.2 04/07/2020     04/07/2020       PROBLEM LIST:  Patient Active Problem List   Diagnosis   • Organic mood disorder of depressed type   • HTN (hypertension)   • Anxiety, generalized   • Prediabetes   • Hyperlipidemia   • Fatigue   • Dyspepsia   • Dyspnea on exertion   • Morbid obesity (CMS/HCC)   • Sleep apnea with use of continuous positive airway pressure (CPAP)   • Anesthesia complication   • Heartburn   • Duodenal ulcer       PAST MEDICAL HX  Past Medical History:   Diagnosis Date   • Anesthesia complication     hard time waking   • Anxiety, generalized    • Duodenal " ulcer     dx during ERCP 3/2019, no bx, HPSA (-), continues on daily Protonix since   • Dyspepsia    • Dyspnea on exertion    • Fatigue    • Heartburn     controlled w/ daily Protonix   • HTN (hypertension)    • Hyperlipidemia     borderline, no meds   • Morbid obesity (CMS/HCC)    • Organic mood disorder of depressed type    • Prediabetes     A1C 6.1   • Sleep apnea with use of continuous positive airway pressure (CPAP)     compliant with machine    • Wears glasses        Allergies  Allergies   Allergen Reactions   • Sulfabenzamide Hives     generalized   • Sulfa Antibiotics Hives       Current Medications    Current Outpatient Medications:   •  Biotin 2.5 MG tablet, Take 2.5 mg by mouth Daily., Disp: , Rfl:   •  docusate sodium 100 MG capsule, Take 100 mg by mouth 2 (Two) Times a Day., Disp: 20 each, Rfl: 0  •  FLUoxetine (PROzac) 20 MG capsule, Take 1-2 capsules by mouth Daily. (Patient taking differently: Take 40 mg by mouth Daily.), Disp: 60 capsule, Rfl: 5  •  loratadine-pseudoephedrine (CLARITIN-D 24-hour)  MG per 24 hr tablet, Take 1 tablet by mouth Daily., Disp: 15 tablet, Rfl: 0  •  Multiple Vitamin (MULTI VITAMIN DAILY PO), Take 1 capsule by mouth Daily., Disp: , Rfl:   •  pantoprazole (PROTONIX) 40 MG EC tablet, Take 40 mg by mouth Daily., Disp: , Rfl:   •  triamterene-hydrochlorothiazide (MAXZIDE-25) 37.5-25 MG per tablet, Take 1 tablet by mouth Daily., Disp: 30 tablet, Rfl: 5         ROS  Review of Systems   Cardiovascular: Negative for chest pain, dyspnea on exertion, irregular heartbeat, leg swelling, near-syncope, orthopnea, palpitations, paroxysmal nocturnal dyspnea and syncope.   Respiratory: Positive for sleep disturbances due to breathing. Negative for cough, shortness of breath and wheezing.        All other body systems reviewed and are negative    SOCIAL HX  Social History     Socioeconomic History   • Marital status:      Spouse name: Not on file   • Number of children: Not on  file   • Years of education: Not on file   • Highest education level: Not on file   Tobacco Use   • Smoking status: Never Smoker   • Smokeless tobacco: Never Used   Substance and Sexual Activity   • Alcohol use: Yes     Comment: Occassional   • Drug use: No   • Sexual activity: Defer   Social History Narrative    Lives in [place], [place] but moving to [facility] w/ her boyfriend.  She has 3 children aged: 29, 27, and 26.  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         FAMILY HX  Family History   Problem Relation Age of Onset   • Hypertension Mother    • Obesity Mother    • Heart disease Father    • Heart attack Father    • Stroke Maternal Grandmother    • Hypertension Maternal Grandmother    • Diabetes Maternal Grandmother    • Diabetes Maternal Grandfather    • Hypertension Maternal Grandfather    • Stroke Maternal Grandfather    • Hypertension Paternal Grandmother    • Heart attack Paternal Grandmother    • Hypertension Paternal Grandfather    • Breast cancer Neg Hx    • Ovarian cancer Neg Hx              Jose Armenta III, MD, [facility]

## 2020-05-07 ENCOUNTER — OFFICE VISIT (OUTPATIENT)
Dept: FAMILY MEDICINE CLINIC | Facility: CLINIC | Age: 50
End: 2020-05-07

## 2020-05-07 VITALS
BODY MASS INDEX: 40.04 KG/M2 | WEIGHT: 226 LBS | HEIGHT: 63 IN | TEMPERATURE: 98.1 F | DIASTOLIC BLOOD PRESSURE: 82 MMHG | OXYGEN SATURATION: 99 % | SYSTOLIC BLOOD PRESSURE: 134 MMHG | HEART RATE: 71 BPM

## 2020-05-07 DIAGNOSIS — E66.01 MORBID OBESITY WITH BMI OF 40.0-44.9, ADULT (HCC): Primary | ICD-10-CM

## 2020-05-07 DIAGNOSIS — I10 ESSENTIAL HYPERTENSION: ICD-10-CM

## 2020-05-07 DIAGNOSIS — G47.30 SLEEP APNEA WITH USE OF CONTINUOUS POSITIVE AIRWAY PRESSURE (CPAP): ICD-10-CM

## 2020-05-07 PROCEDURE — 93000 ELECTROCARDIOGRAM COMPLETE: CPT | Performed by: PHYSICIAN ASSISTANT

## 2020-05-07 PROCEDURE — 99213 OFFICE O/P EST LOW 20 MIN: CPT | Performed by: PHYSICIAN ASSISTANT

## 2020-05-07 NOTE — PROGRESS NOTES
Subjective   Aniya Castro is a 49 y.o. female  Weight Check (Follow up on weight for Lapband Sx with Dr. Rao )      History of Present Illness  Patient is a pleasant 41-year-old white female who presents today for evaluation treatment of morbid obesity and for consultation regarding weight loss management.  She is interested in bariatric surgical intervention for treatment of her longstanding morbid obesity.  She is successfully lost weight in the past and low calorie diet for a short period of time but is never been able to successfully maintain her weight loss.  She states she struggles with trying to control her portions at times.  She is trying to stay active but has been having a difficult time lately during the pandemic.  She is also had difficult time with food choices during the pandemic.  She thinks that she has been eating more carbs lately.  Her weight is up about 40 pounds her last year.  She states that time she followed an extremely strict low calorie diet and lost a lot of weight and then she returned to a fairly normal diet no weight came back up.  Patient hypertension is currently stable on medication.  She does have obstructive sleep apnea which is followed by neurology she had follow-up through telemedicine with her cardiologist earlier this month  The following portions of the patient's history were reviewed and updated as appropriate: allergies, current medications, past social history and problem list    Review of Systems   Constitutional: Positive for activity change, appetite change and unexpected weight change. Negative for fatigue.   Respiratory: Negative for cough, chest tightness and shortness of breath.    Cardiovascular: Negative for chest pain, palpitations and leg swelling.   Gastrointestinal: Negative for abdominal distention, abdominal pain, diarrhea and nausea.   Skin: Negative for color change and rash.   Neurological: Negative for dizziness, syncope, weakness and  headaches.   Psychiatric/Behavioral: Negative for dysphoric mood. The patient is not nervous/anxious.      ECG 12 Lead  Date/Time: 5/7/2020 4:36 PM  Performed by: Temi Rangel PA-C  Authorized by: Temi Rangel PA-C   Comparison: not compared with previous ECG   Rhythm: sinus rhythm  Rate: normal  BPM: 72  Conduction: conduction normal  ST Segments: ST segments normal  T Waves: T waves normal  QRS axis: normal  Other: no other findings    Clinical impression: normal ECG            Objective     Vitals:    05/07/20 1610   BP: 134/82   Pulse: 71   Temp: 98.1 °F (36.7 °C)   SpO2: 99%     BMI 40    Physical Exam   Constitutional: She appears well-developed and well-nourished. No distress.   Obesity noted     Neck: No thyromegaly present.   Cardiovascular: Normal rate and regular rhythm.   Pulmonary/Chest: Effort normal and breath sounds normal. No respiratory distress. She has no wheezes. She has no rales.   Abdominal: Soft. There is no tenderness.   Musculoskeletal: She exhibits no edema.   Skin: She is not diaphoretic.   Psychiatric: She has a normal mood and affect. Her behavior is normal. Judgment and thought content normal.   Nursing note and vitals reviewed.      Assessment/Plan     Aniya was seen today for weight check.    Diagnoses and all orders for this visit:    Morbid obesity with BMI of 40.0-44.9, adult (CMS/HCC)    Essential hypertension    Sleep apnea with use of continuous positive airway pressure (CPAP)    Other orders  -     ECG 12 Lead       Answers for HPI/ROS submitted by the patient on 5/6/2020   What is the primary reason for your visit?: Other  Please describe your symptoms.: Weight check in preparation for weight loss surgery  Have you had these symptoms before?: Yes  How long have you been having these symptoms?: Other      I discussed my recommendations with patient that she start following a lower carbohydrate, higher protein diet while restricting her calories to 1200 natasha  daily.  Discussed importance of avoiding carbonated sodas, trying to avoid concentrated sugars and increase her water intake.  Discussed ways to increase her daily exercise as well.  Follow-up in 1 month for recheck

## 2020-06-08 ENCOUNTER — OFFICE VISIT (OUTPATIENT)
Dept: FAMILY MEDICINE CLINIC | Facility: CLINIC | Age: 50
End: 2020-06-08

## 2020-06-08 VITALS
WEIGHT: 224 LBS | OXYGEN SATURATION: 98 % | HEIGHT: 63 IN | DIASTOLIC BLOOD PRESSURE: 76 MMHG | TEMPERATURE: 98 F | HEART RATE: 79 BPM | SYSTOLIC BLOOD PRESSURE: 114 MMHG | BODY MASS INDEX: 39.69 KG/M2

## 2020-06-08 DIAGNOSIS — E66.01 MORBID OBESITY (HCC): ICD-10-CM

## 2020-06-08 DIAGNOSIS — I10 ESSENTIAL HYPERTENSION: Primary | ICD-10-CM

## 2020-06-08 PROCEDURE — 99213 OFFICE O/P EST LOW 20 MIN: CPT | Performed by: PHYSICIAN ASSISTANT

## 2020-06-08 RX ORDER — PHENTERMINE HYDROCHLORIDE 37.5 MG/1
TABLET ORAL
COMMUNITY
Start: 2020-05-17 | End: 2020-06-08

## 2020-06-08 NOTE — PROGRESS NOTES
Subjective   Aniya Castro is a 49 y.o. 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  She is following up today on hypertension and obesity.  She is scheduled to be evaluated by the bariatric department next week.  Her weight is down 2 pounds.  She has been trying to follow a diet low in carbohydrates and lowering calories.  She is found increase her exercise as well.  Blood pressures well controlled on medication  The following portions of the patient's history were reviewed and updated as appropriate: allergies, current medications, past social history and problem list    Review of Systems   Constitutional: Negative for activity change, appetite change and unexpected weight change.   Respiratory: Negative.    Cardiovascular: Negative.  Negative for chest pain.   Gastrointestinal: Negative for abdominal distention, abdominal pain, diarrhea and nausea.   Psychiatric/Behavioral: Negative for dysphoric mood. The patient is not nervous/anxious.        Objective     Vitals:    06/08/20 1616   BP: 114/76   Pulse: 79   Temp: 98 °F (36.7 °C)   SpO2: 98%       Physical Exam   Constitutional: She appears well-developed and well-nourished. No distress.   Obesity noted     Neck: No thyromegaly present.   Cardiovascular: Normal rate and regular rhythm.   Pulmonary/Chest: Effort normal and breath sounds normal.   Abdominal: Soft. There is no tenderness.   Skin: She is not diaphoretic.   Psychiatric: She has a normal mood and affect. Her behavior is normal. Judgment and thought content normal.   Nursing note and vitals reviewed.      Assessment/Plan     Aniya was seen today for weight check.    Diagnoses and all orders for this visit:    Essential hypertension    Morbid obesity (CMS/HCC)    Encouraged patient to continue following a low calorie  low-carb high-protein diet with regular daily exercise and adequate water intake, avoiding carbonated beverages and avoiding concentrated sugars and processed foods.  Follow-up in 1 month for recheck.  Answers for HPI/ROS submitted by the patient on 6/4/2020   What is the primary reason for your visit?: Other  Please describe your symptoms.: weight check  Have you had these symptoms before?: Yes  How long have you been having these symptoms?: Greater than 2 weeks

## 2020-06-11 ENCOUNTER — TELEMEDICINE (OUTPATIENT)
Dept: BARIATRICS/WEIGHT MGMT | Facility: CLINIC | Age: 50
End: 2020-06-11

## 2020-06-11 DIAGNOSIS — K27.9 PEPTIC ULCER: Primary | ICD-10-CM

## 2020-06-11 DIAGNOSIS — R73.03 PREDIABETES: ICD-10-CM

## 2020-06-11 DIAGNOSIS — E66.01 OBESITY, CLASS III, BMI 40-49.9 (MORBID OBESITY) (HCC): ICD-10-CM

## 2020-06-11 DIAGNOSIS — K26.9 DUODENAL ULCER: ICD-10-CM

## 2020-06-11 DIAGNOSIS — G47.30 SLEEP APNEA WITH USE OF CONTINUOUS POSITIVE AIRWAY PRESSURE (CPAP): ICD-10-CM

## 2020-06-11 DIAGNOSIS — I10 ESSENTIAL HYPERTENSION: ICD-10-CM

## 2020-06-11 PROCEDURE — 99214 OFFICE O/P EST MOD 30 MIN: CPT | Performed by: SURGERY

## 2020-06-11 NOTE — PROGRESS NOTES
[facility]  [address]  [address]  [facility] [address]  [phone]        Patient Name: Aniya Castro.  YOB: 1970      Date of Visit: 2020      Reason for Visit:  Ulcer disease    HPI:  Aniya Castro is a 49 y.o. female  Preparing for sleeve with Dr. Rao.    She was diagnosed with an ulcer last March when she had cholecystectomy followed by ERCP for CBD stone.  The ERCP showed a duodenal ulcer with slight narrowing of the duodenum and she was placed on Protonix. She has a hx of epigastric pain for years and the patient presumed she had an ulcer, although one was never diagnosed until the ERCP.  Her RUQ pain completely resolved after cholecystectomy.      She is having EGD on Monday.      Past Medical History:   Diagnosis Date   • Anesthesia complication     hard time waking   • Anxiety, generalized    • Duodenal ulcer     dx during ERCP 3/2019, no bx, HPSA (-), continues on daily Protonix since   • Dyspepsia    • Dyspnea on exertion    • Fatigue    • Heartburn     controlled w/ daily Protonix   • HTN (hypertension)    • Hyperlipidemia     borderline, no meds   • Morbid obesity (CMS/HCC)    • Organic mood disorder of depressed type    • Prediabetes     A1C 6.1   • Sleep apnea with use of continuous positive airway pressure (CPAP)     compliant with machine    • Wears glasses      Past Surgical History:   Procedure Laterality Date   • ADENOIDECTOMY     •  SECTION     • CHOLECYSTECTOMY WITH INTRAOPERATIVE CHOLANGIOGRAM N/A 3/5/2019    Procedure: CHOLECYSTECTOMY LAPAROSCOPIC WITH IOC;  Surgeon: Wali Nicole MD;  Location: [facility] OR;  Service: General   • EAR TUBES     • ERCP N/A 3/6/2019    Procedure: ENDOSCOPIC RETROGRADE CHOLANGIOPANCREATOGRAPHY;  Surgeon: Todd Campos MD;  Location:  [facility];  Service: Gastroenterology   • INTRAUTERINE DEVICE INSERTION      due for removal   • WISDOM  TOOTH EXTRACTION  1987     No outpatient medications have been marked as taking for the 6/11/20 encounter (Appointment) with Violette Hagan MD.     Allergies   Allergen Reactions   • Sulfabenzamide Hives     generalized   • Sulfa Antibiotics Hives       Social History     Socioeconomic History   • Marital status:      Spouse name: Not on file   • Number of children: Not on file   • Years of education: Not on file   • Highest education level: Not on file   Tobacco Use   • Smoking status: Never Smoker   • Smokeless tobacco: Never Used   Substance and Sexual Activity   • Alcohol use: Yes     Comment: Occassional   • Drug use: No   • Sexual activity: Defer   Social History Narrative    Lives in [place], [place] but moving to [facility] w/ her boyfriend.  She has 3 children aged: 29, 27, and 26.  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         There were no vitals filed for this visit.  Weight    There is no height or weight on file to calculate BMI.    Physical Exam   Constitutional: She is oriented to person, place, and time. She appears well-developed and well-nourished. No distress.   HENT:   Head: Normocephalic and atraumatic.   Mouth/Throat: No oropharyngeal exudate.   Eyes: Pupils are equal, round, and reactive to light. Conjunctivae and EOM are normal. No scleral icterus.   Pulmonary/Chest: Effort normal. No respiratory distress.   Neurological: She is oriented to person, place, and time. No cranial nerve deficit.   Skin: Skin is warm and dry. She is not diaphoretic. No erythema.   Psychiatric: She has a normal mood and affect. Her behavior is normal.         Assessment:      ICD-10-CM ICD-9-CM   1. Peptic ulcer K27.9 533.90   2. Duodenal ulcer K26.9 532.90   3. Prediabetes R73.03 790.29   4. Essential hypertension I10 401.9   5. Sleep apnea with use of continuous positive airway pressure (CPAP) G47.30 327.23   6. Obesity, Class III, BMI 40-49.9 (morbid  obesity) (CMS/[facility]) E66.01 278.01       Plan: EGD with biopsy.  Pt instructed to adhere to NPO after midnight, full liquids for 24 hours before procedure.      The risks and benefits of the upper endoscopy were discussed with the patient in detail and all questions were answered.  Possibility of perforation, bleeding, aspiration, and anesthesia reaction were reviewed.  Patient agrees to proceed.

## 2020-06-12 ENCOUNTER — APPOINTMENT (OUTPATIENT)
Dept: PREADMISSION TESTING | Facility: HOSPITAL | Age: 50
End: 2020-06-12

## 2020-06-17 ENCOUNTER — PREP FOR SURGERY (OUTPATIENT)
Dept: OTHER | Facility: HOSPITAL | Age: 50
End: 2020-06-17

## 2020-06-17 DIAGNOSIS — K27.9 PEPTIC ULCER: Primary | ICD-10-CM

## 2020-06-17 RX ORDER — SODIUM CHLORIDE 9 MG/ML
150 INJECTION, SOLUTION INTRAVENOUS CONTINUOUS
Status: CANCELLED | OUTPATIENT
Start: 2020-06-17

## 2020-06-17 RX ORDER — SODIUM CHLORIDE 0.9 % (FLUSH) 0.9 %
3 SYRINGE (ML) INJECTION EVERY 12 HOURS SCHEDULED
Status: CANCELLED | OUTPATIENT
Start: 2020-06-17

## 2020-06-17 RX ORDER — SODIUM CHLORIDE 0.9 % (FLUSH) 0.9 %
3-10 SYRINGE (ML) INJECTION AS NEEDED
Status: CANCELLED | OUTPATIENT
Start: 2020-06-17

## 2020-06-19 ENCOUNTER — APPOINTMENT (OUTPATIENT)
Dept: PREADMISSION TESTING | Facility: HOSPITAL | Age: 50
End: 2020-06-19

## 2020-07-03 ENCOUNTER — APPOINTMENT (OUTPATIENT)
Dept: PREADMISSION TESTING | Facility: HOSPITAL | Age: 50
End: 2020-07-03

## 2020-07-03 PROCEDURE — C9803 HOPD COVID-19 SPEC COLLECT: HCPCS

## 2020-07-03 PROCEDURE — U0002 COVID-19 LAB TEST NON-CDC: HCPCS

## 2020-07-03 PROCEDURE — U0004 COV-19 TEST NON-CDC HGH THRU: HCPCS

## 2020-07-04 LAB
REF LAB TEST METHOD: NORMAL
SARS-COV-2 RNA RESP QL NAA+PROBE: NOT DETECTED

## 2020-07-06 ENCOUNTER — LAB REQUISITION (OUTPATIENT)
Dept: LAB | Facility: HOSPITAL | Age: 50
End: 2020-07-06

## 2020-07-06 ENCOUNTER — OFFICE VISIT (OUTPATIENT)
Dept: FAMILY MEDICINE CLINIC | Facility: CLINIC | Age: 50
End: 2020-07-06

## 2020-07-06 ENCOUNTER — OUTSIDE FACILITY SERVICE (OUTPATIENT)
Dept: BARIATRICS/WEIGHT MGMT | Facility: CLINIC | Age: 50
End: 2020-07-06

## 2020-07-06 VITALS
HEIGHT: 63 IN | OXYGEN SATURATION: 98 % | TEMPERATURE: 98 F | BODY MASS INDEX: 39.16 KG/M2 | WEIGHT: 221 LBS | HEART RATE: 83 BPM | SYSTOLIC BLOOD PRESSURE: 102 MMHG | DIASTOLIC BLOOD PRESSURE: 72 MMHG

## 2020-07-06 DIAGNOSIS — K27.9 PEPTIC ULCER, SITE UNSPECIFIED, UNSPECIFIED AS ACUTE OR CHRONIC, WITHOUT HEMORRHAGE OR PERFORATION: ICD-10-CM

## 2020-07-06 DIAGNOSIS — M65.311 TRIGGER THUMB OF RIGHT HAND: Primary | ICD-10-CM

## 2020-07-06 DIAGNOSIS — E66.01 MORBID OBESITY (HCC): ICD-10-CM

## 2020-07-06 PROCEDURE — 43239 EGD BIOPSY SINGLE/MULTIPLE: CPT | Performed by: SURGERY

## 2020-07-06 PROCEDURE — 88305 TISSUE EXAM BY PATHOLOGIST: CPT | Performed by: SURGERY

## 2020-07-06 PROCEDURE — 99214 OFFICE O/P EST MOD 30 MIN: CPT | Performed by: PHYSICIAN ASSISTANT

## 2020-07-06 NOTE — PROGRESS NOTES
Subjective   Aniya Castro is a 50 y.o. female  Weight Gain (weight check for bariatric surgery) and Hand Pain (d2hssfob, ROM is affected)      History of Present Illness  Patient is a pleasant 50-year-old white female presents today for 2 separate medical conditions.  Is here for follow-up on weight loss management secondary to obesity.  Her weight is down 5 pounds.  She is working hard to completely limit her diet Cokes, she has been more successful than last month but is still consuming 1 a day.  She is following a low calorie low-carb high-protein diet.  She is walking daily each afternoon.  Weight is down 5 pounds.  Her second issue today is of progressively worsening pain in her right thumb for approximately 6 months.  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  She states it is okay for now she cannot even put her hair up in a ponytail.  She states it is severely painful to flex her right thumb.  No history of direct trauma  The following portions of the patient's history were reviewed and updated as appropriate: allergies, current medications, past social history and problem list    Review of Systems   Constitutional: Positive for activity change and appetite change. Negative for unexpected weight change.   Respiratory: Negative.    Cardiovascular: Negative.  Negative for chest pain.   Gastrointestinal: Negative.  Negative for abdominal distention, abdominal pain, diarrhea and nausea.   Musculoskeletal: Positive for arthralgias and myalgias.   Skin: Negative.    Neurological: Negative.    Psychiatric/Behavioral: Negative.  Negative for dysphoric mood. The patient is not nervous/anxious.        Objective     Vitals:    07/06/20 0847   BP: 102/72   Pulse: 83   Temp: 98 °F (36.7 °C)   SpO2: 98%       Physical Exam   Constitutional: She appears well-developed and well-nourished. No distress.   Obesity noted     Neck: No thyromegaly present.   Cardiovascular: Normal rate and regular  rhythm.   Pulmonary/Chest: Effort normal and breath sounds normal.   Abdominal: Soft. There is no tenderness.   Musculoskeletal: She exhibits tenderness ( Markedly tender at base of first digit right hand palm surface.  Thumb has evidence of hyperflexion with clicking consistent with trigger thumb.). She exhibits no edema or deformity.   Neurological: She exhibits normal muscle tone. Coordination normal.   Skin: Skin is warm and dry. No rash noted. She is not diaphoretic. No erythema. No pallor.   Psychiatric: She has a normal mood and affect. Her behavior is normal. Judgment and thought content normal.   Nursing note and vitals reviewed.      Assessment/Plan     Aniya was seen today for weight gain and hand pain.    Diagnoses and all orders for this visit:    Trigger thumb of right hand  -     Ambulatory Referral to Orthopedic Surgery    Morbid obesity (CMS/[facility])    Encourage patient to continue with low calorie, low-carb, high-protein diet and daily exercise and follow-up in 1 month for recheck of weight.  Answers for HPI/ROS submitted by the patient on 7/4/2020   What is the primary reason for your visit?: Other  Please describe your symptoms.: Weight check, thumb pain  Have you had these symptoms before?: Yes  How long have you been having these symptoms?: Greater than 2 weeks

## 2020-07-07 LAB
CYTO UR: NORMAL
LAB AP CASE REPORT: NORMAL
LAB AP CLINICAL INFORMATION: NORMAL
PATH REPORT.FINAL DX SPEC: NORMAL
PATH REPORT.GROSS SPEC: NORMAL

## 2020-07-16 ENCOUNTER — OFFICE VISIT (OUTPATIENT)
Dept: ORTHOPEDIC SURGERY | Facility: CLINIC | Age: 50
End: 2020-07-16

## 2020-07-16 VITALS — BODY MASS INDEX: 39.06 KG/M2 | HEART RATE: 70 BPM | OXYGEN SATURATION: 100 % | WEIGHT: 220.46 LBS | HEIGHT: 63 IN

## 2020-07-16 DIAGNOSIS — M18.11 OSTEOARTHRITIS OF RIGHT THUMB: ICD-10-CM

## 2020-07-16 DIAGNOSIS — M79.644 THUMB PAIN, RIGHT: Primary | ICD-10-CM

## 2020-07-16 PROCEDURE — 99204 OFFICE O/P NEW MOD 45 MIN: CPT | Performed by: PHYSICIAN ASSISTANT

## 2020-07-16 PROCEDURE — 20550 NJX 1 TENDON SHEATH/LIGAMENT: CPT | Performed by: PHYSICIAN ASSISTANT

## 2020-07-16 RX ADMIN — LIDOCAINE HYDROCHLORIDE 0.5 ML: 10 INJECTION, SOLUTION EPIDURAL; INFILTRATION; INTRACAUDAL; PERINEURAL at 16:07

## 2020-07-16 RX ADMIN — TRIAMCINOLONE ACETONIDE 20 MG: 40 INJECTION, SUSPENSION INTRA-ARTICULAR; INTRAMUSCULAR at 16:07

## 2020-07-16 NOTE — PROGRESS NOTES
Procedure   Small Joint Arthrocentesis  Consent given by: patient  Site marked: site marked  Timeout: Immediately prior to procedure a time out was called to verify the correct patient, procedure, equipment, support staff and site/side marked as required   Supporting Documentation  Indications: pain   Procedure Details  Location: thumb - Thumb joint: Thumb   Preparation: Patient was prepped and draped in the usual sterile fashion  Needle size: 25 G  Approach: medial  Medications administered: 20 mg triamcinolone acetonide 40 MG/ML; 0.5 mL lidocaine PF 1% 1 %  Patient tolerance: patient tolerated the procedure well with no immediate complications

## 2020-07-16 NOTE — PROGRESS NOTES
[facility] Note    Subjective     Chief Complaint   Patient presents with   • Right Thumb - Pain        HPI  Aniya Roopa Castro is a 50 y.o. female.  Right-hand-dominant.  Patient presents for evaluation of her right thumb.  For the last 6 months she is noted pain at the level the A1 pulley, no specific triggering or locking noted.  No history of injury or trauma.  She works as a phlebotomist here at the hospital.    Currently she endorses a pain scale 3/10 but when she is using her thumb and it increases to 8-9/10.  Severity the pain mild to moderate.  Quality pain aching, throbbing.  Associated symptoms stiffness noted to the thumb.  No reported numbness or tingling.    Patient denies any fever, chills, night sweats or other constitutional symptoms.    Past Medical History:   Diagnosis Date   • Anesthesia complication     hard time waking   • Anxiety, generalized    • Duodenal ulcer     dx during ERCP 3/2019, no bx, HPSA (-), continues on daily Protonix since   • Dyspepsia    • Dyspnea on exertion    • Fatigue    • Heartburn     controlled w/ daily Protonix   • HTN (hypertension)    • Hyperlipidemia     borderline, no meds   • Morbid obesity (CMS/HCC)    • Organic mood disorder of depressed type    • Prediabetes     A1C 6.1   • Sleep apnea with use of continuous positive airway pressure (CPAP)     compliant with machine    • Wears glasses       Past Surgical History:   Procedure Laterality Date   • ADENOIDECTOMY     •  SECTION     • CHOLECYSTECTOMY WITH INTRAOPERATIVE CHOLANGIOGRAM N/A 3/5/2019    Procedure: CHOLECYSTECTOMY LAPAROSCOPIC WITH IOC;  Surgeon: Wali Nicole MD;  Location:  [facility];  Service: General   • EAR TUBES     • ERCP N/A 3/6/2019    Procedure: ENDOSCOPIC RETROGRADE CHOLANGIOPANCREATOGRAPHY;  Surgeon: Todd Campos MD;  Location:  [facility];  Service: Gastroenterology   • INTRAUTERINE DEVICE INSERTION      due for removal   •  WISDOM TOOTH EXTRACTION  1987      Family History   Problem Relation Age of Onset   • Hypertension Mother    • Obesity Mother    • Heart disease Father    • Heart attack Father    • Stroke Maternal Grandmother    • Hypertension Maternal Grandmother    • Diabetes Maternal Grandmother    • Diabetes Maternal Grandfather    • Hypertension Maternal Grandfather    • Stroke Maternal Grandfather    • Hypertension Paternal Grandmother    • Heart attack Paternal Grandmother    • Hypertension Paternal Grandfather    • Breast cancer Neg Hx    • Ovarian cancer Neg Hx      Social History     Socioeconomic History   • Marital status:      Spouse name: Not on file   • Number of children: Not on file   • Years of education: Not on file   • Highest education level: Not on file   Tobacco Use   • Smoking status: Never Smoker   • Smokeless tobacco: Never Used   Substance and Sexual Activity   • Alcohol use: Yes     Comment: Occassional   • Drug use: No   • Sexual activity: Defer   Social History Narrative    Lives in [place], [place] but moving to [facility] w/ her boyfriend.  She has 3 children aged: 29, 27, and 26.  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        Current Outpatient Medications on File Prior to Visit   Medication Sig Dispense Refill   • Biotin 2.5 MG tablet Take 2.5 mg by mouth Daily.     • docusate sodium 100 MG capsule Take 100 mg by mouth 2 (Two) Times a Day. 20 each 0   • FLUoxetine (PROzac) 20 MG capsule Take 1-2 capsules by mouth Daily. (Patient taking differently: Take 40 mg by mouth Daily.) 60 capsule 5   • loratadine-pseudoephedrine (CLARITIN-D 24-hour)  MG per 24 hr tablet Take 1 tablet by mouth Daily. 15 tablet 0   • Multiple Vitamin (MULTI VITAMIN DAILY PO) Take 1 capsule by mouth Daily.     • pantoprazole (PROTONIX) 40 MG EC tablet Take 40 mg by mouth Daily.     • triamterene-hydrochlorothiazide (MAXZIDE-25) 37.5-25 MG per tablet Take 1 tablet by mouth  "Daily. 30 tablet 5     No current facility-administered medications on file prior to visit.       Allergies   Allergen Reactions   • Sulfabenzamide Hives     generalized   • Sulfa Antibiotics Hives        The following portions of the patient's history were reviewed and updated as appropriate: allergies, current medications, past family history, past medical history, past social history, past surgical history and problem list.    Review of Systems   Constitutional: Negative.    HENT: Negative.    Eyes: Negative.    Respiratory: Negative.    Cardiovascular: Negative.    Gastrointestinal: Negative.    Endocrine: Negative.    Genitourinary: Negative.    Musculoskeletal: Positive for arthralgias.   Skin: Negative.    Allergic/Immunologic: Negative.    Neurological: Negative.    Hematological: Negative.    Psychiatric/Behavioral: Negative.         Objective      Physical Exam  Pulse 70   Ht 160 cm (62.99\")   Wt 100 kg (220 lb 7.4 oz)   SpO2 100%   BMI 39.06 kg/m²     Body mass index is 39.06 kg/m².    GENERAL APPEARANCE: awake, alert & oriented x 3, in no acute distress and well developed, well nourished  PSYCH: normal mood and affect  LUNGS:  breathing nonlabored, no wheezing  EYES: sclera anicteric, pupils equal  CARDIOVASCULAR: palpable radial pulses bilaterally. Capillary refill less than 2 seconds  INTEGUMENTARY: skin intact, no clubbing, cyanosis  NEUROLOGIC:  Normal Sensation        Ortho Exam  Peripheral Vascular   Bilateral Upper Extremity    No cyanotic nail beds    Pink nail beds and rapid capillary refill   Palpation    Radial Pulse - Bilaterally normal    Neurologic   Sensory: Light touch intact- Right hand      Right Upper Extremity    Right wrist extensors: 5/5    Right wrist flexors: 5/5    Right intrinsics: 5/5    Musculoskeletal     Inspection and Palpation   Right Wrist      Tenderness - none    Swelling - none    Crepitus - none    Muscle tone - no atrophy     Functional Testing:     Right Wrist " and Thumb    Finklestein's: Negative    CMC shuck: Negative    CMC grind: Negative    CMC tenderness: Very slight discomfort    A1 pulley: Positive for pain and thickness noted.  No reproducible triggering.       Strength and Tone    Right  strength: good    Left  strength: good     Hand Exam/Etc: Patient is able to make a composite fist.  Decreased mobility noted to the thumb.  Opposition remains intact.      Imaging/Studies  Ordered right thumb plain films.  Imaging read by Dr. Escobar.    Imaging Results (Last 7 Days)     Procedure Component Value Units Date/Time    XR Finger 2+ View Right [[id number]] Resulted:  07/16/20 1559     Updated:  07/16/20 1607    Narrative:       Indication: Right thumb pain    Comparison: Todays xrays were compared to previous xrays from 6/26/2018      Impression:            Right thumb 2 views: Radiographs demonstrate mild first CMC joint   arthritis.  Moderate IP joint arthritis especially on the palmar aspect.    No acute bony injury or fracture.          Assessment/Plan        ICD-10-CM ICD-9-CM   1. Thumb pain, right M79.644 729.5   2. Osteoarthritis of right thumb M18.11 715.34       Orders Placed This Encounter   Procedures   • Small Joint Arthrocentesis   • XR Finger 2+ View Right        -Right thumb pain with evidence of arthritis especially at the IPJ.  -No discrete locking or triggering consistent with a trigger digit; however, she does have thickening at the level of the A1 pulley.  -Offered and accepted right thumb A1 pulley trigger digit corticosteroid injection.  Injection was given today.  -Recommend over-the-counter pain medication as needed.  -Follow-up 6 weeks for repeat evaluation, sooner if issues arise or symptoms worsen/change.    -Questions and concerns answered.      After discussing the risks, benefits, indications of injection, the patient gave consent to proceed.  Right thumb A1 pulley was confirmed as the correct site to be injected with a timeout.   It was then prepped using Hibiclens and injected with a mixture of 1/2 cc of 1% plain lidocaine and 1/2 cc of Kenalog (40 mg per mL), without any resistance through the volar approach, patient in seated position.  Area was cleaned, hemostasis was achieved and a Band-Aid was applied over the injection site.  The patient tolerated procedure well.  I instructed the patient on signs and symptoms of infection.  They should report to the emergency department or return to clinic if any of these develop, for further evaluation and treatment.  Recommended modifying activity for the next 48 hours to include rest, ice, elevation and oral pain medication as needed.        Medical Decision Making  Management Options : prescription/IM medicine  Data/Risk: radiology tests       Caitlin Lundberg PA-C  07/17/20  12:14         EMR Dragon/Transcription disclaimer:  Much of this encounter note is an electronic transcription of spoken language to printed text. Electronic transcription of spoken language may permit erroneous, or at times, nonsensical words or phrases to be inadvertently transcribed. Although I have reviewed the note for such errors, some may still exist.      Answers for HPI/ROS submitted by the patient on 7/13/2020   What is the primary reason for your visit?: Other  Please describe your symptoms.: Pain in right thumb for last six months. Unable to straighten thumb out or use it.  Have you had these symptoms before?: No  How long have you been having these symptoms?: Greater than 2 weeks  Please describe any probable cause for these symptoms. : Repetitive motion work related. 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

## 2020-07-17 RX ORDER — LIDOCAINE HYDROCHLORIDE 10 MG/ML
0.5 INJECTION, SOLUTION EPIDURAL; INFILTRATION; INTRACAUDAL; PERINEURAL
Status: COMPLETED | OUTPATIENT
Start: 2020-07-16 | End: 2020-07-16

## 2020-07-17 RX ORDER — TRIAMCINOLONE ACETONIDE 40 MG/ML
20 INJECTION, SUSPENSION INTRA-ARTICULAR; INTRAMUSCULAR
Status: COMPLETED | OUTPATIENT
Start: 2020-07-16 | End: 2020-07-16

## 2020-08-04 ENCOUNTER — LAB (OUTPATIENT)
Dept: LAB | Facility: HOSPITAL | Age: 50
End: 2020-08-04

## 2020-08-04 ENCOUNTER — TRANSCRIBE ORDERS (OUTPATIENT)
Dept: LAB | Facility: HOSPITAL | Age: 50
End: 2020-08-04

## 2020-08-04 DIAGNOSIS — Z01.419 ROUTINE GYNECOLOGICAL EXAMINATION: Primary | ICD-10-CM

## 2020-08-04 DIAGNOSIS — Z01.419 ROUTINE GYNECOLOGICAL EXAMINATION: ICD-10-CM

## 2020-08-04 LAB — FSH SERPL-ACNC: 8.6 MIU/ML

## 2020-08-04 PROCEDURE — 83001 ASSAY OF GONADOTROPIN (FSH): CPT

## 2020-08-04 PROCEDURE — 36415 COLL VENOUS BLD VENIPUNCTURE: CPT

## 2020-08-07 ENCOUNTER — OFFICE VISIT (OUTPATIENT)
Dept: FAMILY MEDICINE CLINIC | Facility: CLINIC | Age: 50
End: 2020-08-07

## 2020-08-07 VITALS
WEIGHT: 220.2 LBS | HEART RATE: 79 BPM | BODY MASS INDEX: 39.02 KG/M2 | HEIGHT: 63 IN | SYSTOLIC BLOOD PRESSURE: 140 MMHG | TEMPERATURE: 97.3 F | DIASTOLIC BLOOD PRESSURE: 82 MMHG | RESPIRATION RATE: 15 BRPM | OXYGEN SATURATION: 99 %

## 2020-08-07 DIAGNOSIS — E66.01 MORBID OBESITY (HCC): Primary | ICD-10-CM

## 2020-08-07 PROCEDURE — 99213 OFFICE O/P EST LOW 20 MIN: CPT | Performed by: FAMILY MEDICINE

## 2020-08-16 NOTE — PROGRESS NOTES
Subjective   Aniya Castro is a 50 y.o. female    Chief Complaint    Obesity    History of Present Illness  Patient is here for recheck related to her chronic obesity.  She continues to work on diet and exercise at home.  She reports that she is fairly compliant with her diet but does not do any significant amount of exercise.  This visit is #4 of 6 that are required prior to further consideration for bariatric procedures.  She has no acute complaints today.  She has been counseled regarding diet and exercise and is also had a psychological evaluation.  Generally there is a form that has been completed in the past but the patient states that she was never informed that there were any forms to complete that we simply needed to document this in her electronic medical record which we are doing.    The following portions of the patient's history were reviewed and updated as appropriate: allergies, current medications, past social history and problem list    Review of Systems   Constitutional: Positive for activity change, appetite change and unexpected weight change. Negative for fatigue.   Respiratory: Negative for cough, chest tightness and shortness of breath.    Cardiovascular: Negative for chest pain, palpitations and leg swelling.   Gastrointestinal: Negative for abdominal distention, abdominal pain, diarrhea and nausea.   Skin: Negative for color change and rash.   Neurological: Negative for dizziness, syncope, weakness and headaches.   Psychiatric/Behavioral: Negative for dysphoric mood. The patient is not nervous/anxious.        Objective     Vitals:    08/07/20 1544   BP: 140/82   Pulse: 79   Resp: 15   Temp: 97.3 °F (36.3 °C)   SpO2: 99%       Physical Exam   Constitutional: She appears well-developed and well-nourished. No distress.   Obesity noted     Neck: No thyromegaly present.   Cardiovascular: Normal rate and regular rhythm.   Pulmonary/Chest: Effort normal and breath sounds normal. No  respiratory distress. She has no wheezes. She has no rales.   Abdominal: Soft. There is no tenderness.   Musculoskeletal: She exhibits no edema.   Skin: She is not diaphoretic.   Psychiatric: She has a normal mood and affect. Her behavior is normal. Judgment and thought content normal.   Nursing note and vitals reviewed.      Assessment/Plan   Problem List Items Addressed This Visit        Digestive    Morbid obesity (CMS/HCC) - Primary        Continue efforts at diet and exercise particularly those diet habits that will be helpful post surgery.  Continue follow-up per plan of bariatric surgery department.       Answers for HPI/ROS submitted by the patient on 8/4/2020   What is the primary reason for your visit?: Other  Please describe your symptoms.: weight check for bariatric surgery  Have you had these symptoms before?: No  How long have you been having these symptoms?: Greater than 2 weeks  Please list any medications you are currently taking for this condition.: none  Please describe any probable cause for these symptoms. : overeating

## 2020-08-24 ENCOUNTER — OFFICE VISIT (OUTPATIENT)
Dept: BARIATRICS/WEIGHT MGMT | Facility: CLINIC | Age: 50
End: 2020-08-24

## 2020-08-24 VITALS
WEIGHT: 219.5 LBS | DIASTOLIC BLOOD PRESSURE: 86 MMHG | TEMPERATURE: 97.8 F | SYSTOLIC BLOOD PRESSURE: 148 MMHG | HEART RATE: 93 BPM | RESPIRATION RATE: 18 BRPM | OXYGEN SATURATION: 99 % | BODY MASS INDEX: 38.89 KG/M2 | HEIGHT: 63 IN

## 2020-08-24 DIAGNOSIS — E66.01 MORBID OBESITY (HCC): Primary | ICD-10-CM

## 2020-08-24 PROCEDURE — 99214 OFFICE O/P EST MOD 30 MIN: CPT | Performed by: PHYSICIAN ASSISTANT

## 2020-08-24 NOTE — PROGRESS NOTES
[facility]  [address]  [address]  [facility] [id number] [phone]      Patient  Name:  Aniya Castro  :  1970      Date of Visit: 2020      Chief Complaint:  weight gain; unable to maintain weight loss    History of Present Illness:  Aniya Castro is a 50 y.o. female pursuing LSG w/ Dr. Rao.     Aniya has been overweight for at least 35 years, has been 35 pounds or more overweight for at least 20 years, and started dieting at age 11.  Previous diet attempts include: Calorie Counting.  The most weight Aniya lost was 35 pounds but was unable to maintain that weight loss.  Her maximum lifetime weight is 230 pounds.    As above, patient has been overweight for many years, with numerous unsuccessful dietary/weight loss attempts.  She now has obesity related comorbidities and as such has decided to pursue metabolic and bariatric surgery.  All past medical, surgical, social and family history have been obtained and discussed today, as pertinent to bariatric surgery.     Hx significant for duodenal ulcer dx during ERCP 3/2019, no bx, HPSA (-), has continued on daily Protonix since.  EGD 20 w/ Dr. Rao revealed benign fundic gland polyp, no gastritis, no ulcer, small asymmetric hiatal hernia, bx c/w reflux.      Past Medical History:   Diagnosis Date   • Anesthesia complication     hard time waking   • Anxiety, generalized    • Duodenal ulcer     dx during ERCP 3/2019, no bx, HPSA (-), continues on daily Protonix since   • Dyspepsia    • Dyspnea on exertion    • Fatigue    • Heartburn     controlled w/ daily Protonix, EGD 20 w/ [facility] revealed small asymmetric HH   • HTN (hypertension)    • Hyperlipidemia     borderline, no meds   • Morbid obesity (CMS/HCC)    • Organic mood disorder of depressed type    • Prediabetes     A1C 6.1   • Sleep apnea with use of continuous positive airway pressure (CPAP)     compliant with machine    •  Wears glasses      Past Surgical History:   Procedure Laterality Date   • ADENOIDECTOMY     •  SECTION     • CHOLECYSTECTOMY WITH INTRAOPERATIVE CHOLANGIOGRAM N/A 3/5/2019    Procedure: CHOLECYSTECTOMY LAPAROSCOPIC WITH IOC;  Surgeon: Wali Nicole MD;  Location:  [facility];  Service: General   • EAR TUBES     • ERCP N/A 3/6/2019    Procedure: ENDOSCOPIC RETROGRADE CHOLANGIOPANCREATOGRAPHY;  Surgeon: Todd Campos MD;  Location:  [facility];  Service: Gastroenterology   • INTRAUTERINE DEVICE INSERTION      due for removal   • WISDOM TOOTH EXTRACTION         Allergies   Allergen Reactions   • Sulfabenzamide Hives     generalized   • Sulfa Antibiotics Hives       Current Outpatient Medications:   •  Biotin 2.5 MG tablet, Take 2.5 mg by mouth Daily., Disp: , Rfl:   •  docusate sodium 100 MG capsule, Take 100 mg by mouth 2 (Two) Times a Day., Disp: 20 each, Rfl: 0  •  FLUoxetine (PROzac) 20 MG capsule, Take 1-2 capsules by mouth Daily. (Patient taking differently: Take 40 mg by mouth Daily.), Disp: 60 capsule, Rfl: 5  •  loratadine-pseudoephedrine (CLARITIN-D 24-hour)  MG per 24 hr tablet, Take 1 tablet by mouth Daily., Disp: 15 tablet, Rfl: 0  •  Multiple Vitamin (MULTI VITAMIN DAILY PO), Take 1 capsule by mouth Daily., Disp: , Rfl:   •  pantoprazole (PROTONIX) 40 MG EC tablet, Take 40 mg by mouth Daily., Disp: , Rfl:   •  triamterene-hydrochlorothiazide (MAXZIDE-25) 37.5-25 MG per tablet, Take 1 tablet by mouth Daily., Disp: 30 tablet, Rfl: 5    Social History     Socioeconomic History   • Marital status:      Spouse name: Not on file   • Number of children: Not on file   • Years of education: Not on file   • Highest education level: Not on file   Tobacco Use   • Smoking status: Never Smoker   • Smokeless tobacco: Never Used   Substance and Sexual Activity   • Alcohol use: Yes     Comment: Occassional   • Drug use: No   • Sexual activity: Defer   Social History  Narrative    Lives in [place], [place] but moving to [facility] w/ her boyfriend.  She has 3 children aged: 29, 27, and 26.  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       Family History   Problem Relation Age of Onset   • Hypertension Mother    • Obesity Mother    • Heart disease Father    • Heart attack Father    • Stroke Maternal Grandmother    • Hypertension Maternal Grandmother    • Diabetes Maternal Grandmother    • Diabetes Maternal Grandfather    • Hypertension Maternal Grandfather    • Stroke Maternal Grandfather    • Hypertension Paternal Grandmother    • Heart attack Paternal Grandmother    • Hypertension Paternal Grandfather    • Breast cancer Neg Hx    • Ovarian cancer Neg Hx        Review of Systems:  Constitutional:  reports fatigue, weight gain and denies fevers, chills.  HEENT:  denies headache, ear pain or loss of hearing, blurred or double vision, nasal discharge or sore throat.  Cardiovascular:  reports HTN and denies hx heart disease, chest pain, palpitations, hx DVT.  Respiratory:  reports sleep apnea and denies cough , wheezing, asthma, hx PE.  Gastrointestinal:  reports heartburn and denies dysphagia, nausea, vomiting, abdominal pain, IBS, liver disease.  Genitourinary:  denies history of  frequent UTI, incontinence, hematuria, dysuria, polyuria, renal insufficiency.    Musculoskeletal: denies joint pain, fibromyalgia, arthritis and autoimmune disease.  Neurological:  denies migraines, numbness /tingling, dizziness, confusion, seizure.  Psychiatric:  reports depressed mood, hx depression, feeling anxious, hx anxiety and denies bipolar disorder.  Endocrine:  reports glucose intolerance and denies diabetes, thyroid disease.  Hematologic:  denies bruising, bleeding disorder, hx anemia, hx blood transfusion.  Skin: denies rashes, hx MRSA.\    ROS reviewed today - accurate.     Physical Exam:  Vital Signs:  Weight: 99.6 kg (219 lb 8 oz)   Body mass index is  39.51 kg/m².  Temp: 97.8 °F (36.6 °C)   Heart Rate: 93   BP: 148/86   Physical Exam   Constitutional: She is oriented to person, place, and time. She appears well-developed and well-nourished. She is cooperative.   HENT:   Head: Normocephalic and atraumatic.   wearing a mask   Eyes: Conjunctivae are normal. No scleral icterus.   Neck: Neck supple. No thyromegaly present.   Cardiovascular: Normal rate and regular rhythm.   No murmur heard.  Pulmonary/Chest: Effort normal and breath sounds normal. No respiratory distress. She has no wheezes. She has no rales.   Abdominal: Soft. Bowel sounds are normal. She exhibits no distension and no mass. There is no tenderness. No hernia.   scars: lap shahid, lower transverse   Musculoskeletal: Normal range of motion. She exhibits no edema.   Neurological: She is alert and oriented to person, place, and time. Gait normal.   Skin: Skin is warm and dry. No rash noted.   Psychiatric: She has a normal mood and affect. Judgment normal.   Vitals reviewed.        Assessment:    Aniya Castro is a 50 y.o. female with medically complicated obesity pursuing sleeve gastrectomy.    Patient Active Problem List   Diagnosis   • Organic mood disorder of depressed type   • HTN (hypertension)   • Anxiety, generalized   • Prediabetes   • Hyperlipidemia   • Fatigue   • Dyspepsia   • Dyspnea on exertion   • Morbid obesity (CMS/HCC)   • Sleep apnea with use of continuous positive airway pressure (CPAP)   • Anesthesia complication   • Heartburn   • Duodenal ulcer     Patient's Body mass index is 39.51 kg/m². BMI is above normal parameters. Recommendations include: MBS.  Metabolic and bariatric surgery is deemed medically necessary given the following obesity related comorbidities including sleep apnea, hypertension and prediabetes.      Plan:  Patient understands that bariatric surgery is not cosmetic surgery but rather a tool to help make a lifelong commitment to lifestyle changes including  diet, exercise, behavior modifications, and healthy habits.  The patient has been educated today on those expected postoperative lifestyle changes.  Psychological and Nutritional consultations will be arranged prior to surgery.  Instructions on how to access [employer] (an internet based site w/ educational surgical videos) were given to the patient.  Recommended vitamin supplementation was reviewed.  The importance of avoiding ASA/ NSAIDS/ steroids/ tobacco/ hormones/ immunomodulators perioperatively was discussed in detail.  All questions/concerns have been addressed.      Preop evaluation will include: CBC, CMP, Lipids, TSH, HgA1C, H.Pylori Stool, EKG, CXR and EGD.    Clearances needed prior to surgery will include: Cardiology.       Further input to follow pending surgical consultation w/ Dr. Rao.        SD Kunz

## 2020-08-25 ENCOUNTER — TELEMEDICINE (OUTPATIENT)
Dept: GASTROENTEROLOGY | Facility: CLINIC | Age: 50
End: 2020-08-25

## 2020-08-25 DIAGNOSIS — K27.9 PEPTIC ULCER DISEASE: Primary | ICD-10-CM

## 2020-08-25 PROCEDURE — 99441 PR PHYS/QHP TELEPHONE EVALUATION 5-10 MIN: CPT | Performed by: INTERNAL MEDICINE

## 2020-08-25 RX ORDER — PANTOPRAZOLE SODIUM 40 MG/1
40 TABLET, DELAYED RELEASE ORAL DAILY
Qty: 90 TABLET | Refills: 3 | Status: SHIPPED | OUTPATIENT
Start: 2020-08-25 | End: 2021-08-30 | Stop reason: SDUPTHER

## 2020-08-25 NOTE — PROGRESS NOTES
PCP: Jose Manuel Duggan MD    No chief complaint on file.  cc: f/u  You have chosen to receive care through a telehealth visit.  Do you consent to use a video/audio connection for your medical care today? Yes      History of Present Illness:   Aniya Castro is a 50 y.o. female who presents to GI clinic as a follow up for history of pud. Plans to undergo gastric sleeve. No nausea or dyspepsia. No gerd.     Past Medical History:   Diagnosis Date   • Anesthesia complication     hard time waking   • Anxiety, generalized    • Duodenal ulcer     dx during ERCP 3/2019, no bx, HPSA (-), continues on daily Protonix since   • Dyspepsia    • Dyspnea on exertion    • Fatigue    • Heartburn     controlled w/ daily Protonix, EGD 20 w/ GDW revealed small asymmetric HH   • HTN (hypertension)    • Hyperlipidemia     borderline, no meds   • Morbid obesity (CMS/HCC)    • Organic mood disorder of depressed type    • Prediabetes     A1C 6.1   • Sleep apnea with use of continuous positive airway pressure (CPAP)     compliant with machine    • Wears glasses        Past Surgical History:   Procedure Laterality Date   • ADENOIDECTOMY     •  SECTION     • CHOLECYSTECTOMY WITH INTRAOPERATIVE CHOLANGIOGRAM N/A 3/5/2019    Procedure: CHOLECYSTECTOMY LAPAROSCOPIC WITH IOC;  Surgeon: Wali Nicole MD;  Location: [facility] OR;  Service: General   • EAR TUBES     • ERCP N/A 3/6/2019    Procedure: ENDOSCOPIC RETROGRADE CHOLANGIOPANCREATOGRAPHY;  Surgeon: Todd Campos MD;  Location: [facility] ENDOSCOPY;  Service: Gastroenterology   • INTRAUTERINE DEVICE INSERTION      due for removal   • WISDOM TOOTH EXTRACTION           Current Outpatient Medications:   •  Biotin 2.5 MG tablet, Take 2.5 mg by mouth Daily., Disp: , Rfl:   •  docusate sodium 100 MG capsule, Take 100 mg by mouth 2 (Two) Times a Day., Disp: 20 each, Rfl: 0  •  FLUoxetine (PROzac) 20 MG capsule, Take 1-2 capsules by mouth Daily.  (Patient taking differently: Take 40 mg by mouth Daily.), Disp: 60 capsule, Rfl: 5  •  loratadine-pseudoephedrine (CLARITIN-D 24-hour)  MG per 24 hr tablet, Take 1 tablet by mouth Daily., Disp: 15 tablet, Rfl: 0  •  Multiple Vitamin (MULTI VITAMIN DAILY PO), Take 1 capsule by mouth Daily., Disp: , Rfl:   •  pantoprazole (PROTONIX) 40 MG EC tablet, Take 40 mg by mouth Daily., Disp: , Rfl:   •  triamterene-hydrochlorothiazide (MAXZIDE-25) 37.5-25 MG per tablet, Take 1 tablet by mouth Daily., Disp: 30 tablet, Rfl: 5    Allergies   Allergen Reactions   • Sulfabenzamide Hives     generalized   • Sulfa Antibiotics Hives       Family History   Problem Relation Age of Onset   • Hypertension Mother    • Obesity Mother    • Heart disease Father    • Heart attack Father    • Stroke Maternal Grandmother    • Hypertension Maternal Grandmother    • Diabetes Maternal Grandmother    • Diabetes Maternal Grandfather    • Hypertension Maternal Grandfather    • Stroke Maternal Grandfather    • Hypertension Paternal Grandmother    • Heart attack Paternal Grandmother    • Hypertension Paternal Grandfather    • Breast cancer Neg Hx    • Ovarian cancer Neg Hx        Social History     Socioeconomic History   • Marital status:      Spouse name: Not on file   • Number of children: Not on file   • Years of education: Not on file   • Highest education level: Not on file   Tobacco Use   • Smoking status: Never Smoker   • Smokeless tobacco: Never Used   Substance and Sexual Activity   • Alcohol use: Yes     Comment: Occassional   • Drug use: No   • Sexual activity: Defer   Social History Narrative    Lives in [place], [place] but moving to [facility] w/ her boyfriend.  She has 3 children aged: 29, 27, and 26.  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         Review of Systems  A complete 12 point ros was asked and is negative except for that mentioned above.  In particular:  No fever  No  rash  No increased arthralgias  No worsening edema  No cough  No dyspnea  No chest pain      There were no vitals filed for this visit.    Physical Exam  Unable to perform    Assessment/Plan  1.) History of PUD not due to h. Pylori  2.) Obesity with plans for gastric sleeve  3.) Duodenal narrowing as demonstrated by duodenoscope   4.) history of choledocholithiasis  Diagnostic gastroscope not demonstrating overt d1 narrowing. I would recommend daily ppi use especially in light of pursuing gastric sleeve.  rtc 1 year  7 minutes devoted to telephone visit  Todd Campos MD  8/25/2020

## 2020-09-08 ENCOUNTER — OFFICE VISIT (OUTPATIENT)
Dept: FAMILY MEDICINE CLINIC | Facility: CLINIC | Age: 50
End: 2020-09-08

## 2020-09-08 VITALS
HEIGHT: 63 IN | WEIGHT: 223 LBS | DIASTOLIC BLOOD PRESSURE: 68 MMHG | HEART RATE: 90 BPM | TEMPERATURE: 97.8 F | SYSTOLIC BLOOD PRESSURE: 122 MMHG | BODY MASS INDEX: 39.51 KG/M2 | OXYGEN SATURATION: 99 %

## 2020-09-08 DIAGNOSIS — F41.1 ANXIETY, GENERALIZED: Primary | ICD-10-CM

## 2020-09-08 DIAGNOSIS — E66.01 MORBID OBESITY (HCC): ICD-10-CM

## 2020-09-08 DIAGNOSIS — I10 ESSENTIAL HYPERTENSION: ICD-10-CM

## 2020-09-08 PROCEDURE — 99213 OFFICE O/P EST LOW 20 MIN: CPT | Performed by: PHYSICIAN ASSISTANT

## 2020-09-08 RX ORDER — FLUOXETINE HYDROCHLORIDE 20 MG/1
40 CAPSULE ORAL DAILY
Qty: 60 CAPSULE | Refills: 11 | Status: SHIPPED | OUTPATIENT
Start: 2020-09-08 | End: 2021-02-04

## 2020-09-08 RX ORDER — TRIAMTERENE AND HYDROCHLOROTHIAZIDE 37.5; 25 MG/1; MG/1
1 TABLET ORAL DAILY
Qty: 30 TABLET | Refills: 5 | Status: SHIPPED | OUTPATIENT
Start: 2020-09-08 | End: 2021-01-29 | Stop reason: HOSPADM

## 2020-09-09 ENCOUNTER — DOCUMENTATION (OUTPATIENT)
Dept: BARIATRICS/WEIGHT MGMT | Facility: CLINIC | Age: 50
End: 2020-09-09

## 2020-09-09 NOTE — PROGRESS NOTES
"Metabolic and Bariatric Surgery  Presurgical Nutrition Assessment     Aniya Castro  2020  [id number]  [id number]  1970  female    Surgery desired: Sleeve Gastrectomy     Ht 158.8 cm (62.5\")    Wt 101 kg (223 lb)   Past Medical History:   Diagnosis Date   • Anesthesia complication     hard time waking   • Anxiety, generalized    • Duodenal ulcer     dx during ERCP 3/2019, no bx, HPSA (-), continues on daily Protonix since   • Dyspepsia    • Dyspnea on exertion    • Fatigue    • Heartburn     controlled w/ daily Protonix, EGD 20 w/ GDW revealed small asymmetric HH   • HTN (hypertension)    • Hyperlipidemia     borderline, no meds   • Morbid obesity (CMS/HCC)    • Organic mood disorder of depressed type    • Prediabetes     A1C 6.1   • Sleep apnea with use of continuous positive airway pressure (CPAP)     compliant with machine    • Wears glasses      Past Surgical History:   Procedure Laterality Date   • ADENOIDECTOMY     •  SECTION     • CHOLECYSTECTOMY WITH INTRAOPERATIVE CHOLANGIOGRAM N/A 3/5/2019    Procedure: CHOLECYSTECTOMY LAPAROSCOPIC WITH IOC;  Surgeon: Wali Nicole MD;  Location:  [facility] OR;  Service: General   • EAR TUBES     • ERCP N/A 3/6/2019    Procedure: ENDOSCOPIC RETROGRADE CHOLANGIOPANCREATOGRAPHY;  Surgeon: Todd Campos MD;  Location:  [facility]COPY;  Service: Gastroenterology   • INTRAUTERINE DEVICE INSERTION      due for removal   • WISDOM TOOTH EXTRACTION       Allergies   Allergen Reactions   • Sulfabenzamide Hives     generalized   • Sulfa Antibiotics Hives       Current Outpatient Medications:   •  Biotin 2.5 MG tablet, Take 2.5 mg by mouth Daily., Disp: , Rfl:   •  docusate sodium 100 MG capsule, Take 100 mg by mouth 2 (Two) Times a Day., Disp: 20 each, Rfl: 0  •  FLUoxetine (PROzac) 20 MG capsule, Take 2 capsules by mouth Daily., Disp: 60 capsule, Rfl: 11  •  loratadine-pseudoephedrine (CLARITIN-D 24-hour)  MG per 24 " "hr tablet, Take 1 tablet by mouth Daily., Disp: 15 tablet, Rfl: 0  •  Multiple Vitamin (MULTI VITAMIN DAILY PO), Take 1 capsule by mouth Daily., Disp: , Rfl:   •  pantoprazole (PROTONIX) 40 MG EC tablet, Take 1 tablet by mouth Daily., Disp: 90 tablet, Rfl: 3  •  triamterene-hydrochlorothiazide (MAXZIDE-25) 37.5-25 MG per tablet, Take 1 tablet by mouth Daily., Disp: 30 tablet, Rfl: 5      Nutrition Assessment    Estimated energy needs: 1800    Estimated calories for weight loss: 1300    IBW (Pounds):  150      Excess body weight (Pounds): 73       Nutrition Recall  24 Hour recall: (B) (L) (D) -  Reviewed and discussed with patient  Breakfast:  \"Just Crack and Egg\" scrambled eggs  Lunch:  Baked chicken, pasta, sweet tea  Snack:  Peanut butter and crackers  Dinner:  Steak, baked potato, salad, rolls    Exercise  rarely      Education    Provided manual for Sleeve Gastrectomy and reviewed necessary vitamin and protein supplementation for surgery.     Provided follow-up options for support, including contact information for dietitians here, if desired.  Web-based support information and apps for smart phones and computers given.  Noted that support group is offered at this clinic, and that support is associated with weight loss.    Recommend that team follow per protocol.      Nutrition Goals   Dietary Guidelines per manual  Protein goal:  grams per day     Exercise Goals  Add 15-30 minutes of activity per day as tolerated        Leona Wan, TOMMY  09/09/2020  1:44 PM  "

## 2020-09-09 NOTE — PROGRESS NOTES
Subjective   Aniya Castro is a 50 y.o. female  Weight Check (follow up on weight for bariatric ) and Med Refill (req refills on Prozac and Maxzide )      History of Present Illness  Patient is a pleasant 50-year-old white female who presents today for follow-up on weight loss management association with morbid obesity.  This is the fifth month in a row that she has presented for follow-up on weight loss management as she is preparing for gastric sleeve surgery.  Unfortunately her weight is up this month.  She states she is trying to focus on a daily basis on reducing her overall daily calories, is avoiding snacking, has greatly reduced any processed foods and concentrated sugars in her diet and drinks mainly water.  She tries to eat some regular exercise and daily basis as well.  She has not been counting overall daily calories.  She is also following today of generalized anxiety disorder which is stable on fluoxetine 40 mg daily and following on hypertension which is currently stable on Maxide 25.  The following portions of the patient's history were reviewed and updated as appropriate: allergies, current medications, past social history and problem list    Review of Systems   Constitutional: Positive for unexpected weight change. Negative for activity change, appetite change and fatigue.   Respiratory: Negative.  Negative for chest tightness and shortness of breath.    Cardiovascular: Negative.  Negative for chest pain.   Gastrointestinal: Negative for abdominal distention, abdominal pain, diarrhea and nausea.   Musculoskeletal: Negative.    Neurological: Negative for dizziness, tremors, weakness, light-headedness and headaches.   Psychiatric/Behavioral: Negative for agitation, behavioral problems, confusion, decreased concentration, dysphoric mood, sleep disturbance and suicidal ideas. The patient is not nervous/anxious.         Anxiety stable on current dosage of Prozac       Objective     Vitals:     09/08/20 1643   BP: 122/68   Pulse: 90   Temp: 97.8 °F (36.6 °C)   SpO2: 99%     BMI 40.1  Physical Exam   Constitutional: She appears well-developed and well-nourished. No distress.   Obesity noted     Neck: No JVD present. No thyromegaly present.   Cardiovascular: Normal rate, regular rhythm, normal heart sounds and intact distal pulses.   No murmur heard.  Pulmonary/Chest: Effort normal and breath sounds normal. No respiratory distress. She exhibits no tenderness.   Abdominal: Soft. She exhibits no distension. There is no tenderness.   Musculoskeletal: She exhibits no edema.   Skin: Skin is warm and dry. She is not diaphoretic. No erythema. No pallor.   Psychiatric: She has a normal mood and affect. Her behavior is normal. Judgment and thought content normal.   Nursing note and vitals reviewed.      Assessment/Plan     Aniya was seen today for weight check and med refill.    Diagnoses and all orders for this visit:    Anxiety, generalized    Morbid obesity (CMS/HCC)    Essential hypertension    Other orders  -     FLUoxetine (PROzac) 20 MG capsule; Take 2 capsules by mouth Daily.  -     triamterene-hydrochlorothiazide (MAXZIDE-25) 37.5-25 MG per tablet; Take 1 tablet by mouth Daily.    I encouraged patient to focus on reducing overall portion size with meals and calorie counting to make sure she is lowering her overall daily caloric intake will continue to focus on a high-protein, low-carb, low sugar diet.  Continue to increase water intake and increase activity.  Follow-up in 1 month for recheck of weight.  Answers for HPI/ROS submitted by the patient on 9/8/2020   What is the primary reason for your visit?: Other  Please describe your symptoms.: weight check for upcoming bariatric surgery  Have you had these symptoms before?: Yes  How long have you been having these symptoms?: Greater than 2 weeks

## 2020-09-11 DIAGNOSIS — Z00.00 WELLNESS EXAMINATION: Primary | ICD-10-CM

## 2020-10-12 ENCOUNTER — OFFICE VISIT (OUTPATIENT)
Dept: FAMILY MEDICINE CLINIC | Facility: CLINIC | Age: 50
End: 2020-10-12

## 2020-10-12 VITALS
BODY MASS INDEX: 39.34 KG/M2 | HEART RATE: 82 BPM | TEMPERATURE: 98.3 F | OXYGEN SATURATION: 99 % | DIASTOLIC BLOOD PRESSURE: 66 MMHG | HEIGHT: 63 IN | SYSTOLIC BLOOD PRESSURE: 128 MMHG | WEIGHT: 222 LBS

## 2020-10-12 DIAGNOSIS — E66.01 MORBID OBESITY (HCC): Primary | ICD-10-CM

## 2020-10-12 DIAGNOSIS — I10 ESSENTIAL HYPERTENSION: ICD-10-CM

## 2020-10-12 PROCEDURE — 99213 OFFICE O/P EST LOW 20 MIN: CPT | Performed by: PHYSICIAN ASSISTANT

## 2020-10-12 NOTE — PROGRESS NOTES
Subjective   Aniya Castro is a 50 y.o. female  Weight Check (Follow up on weight for bariatrics )      History of Present Illness  Patient is a pleasant 50-year-old white female who presents today for follow-up on weight loss management association with morbid obesity.  This is her six-month neurologist present for follow-up on weight loss management as she is preparing for gastric sleeve surgery.  Her weight is down 1 pound this month.  She has alternated also resume her diet routinely water, focusing on a healthy high-protein low-carb diet and working on portion control.  Patient also has a comorbidity of hypertension which is currently stable on Maxide 25.  The following portions of the patient's history were reviewed and updated as appropriate: allergies, current medications, past social history and problem list    Review of Systems   Constitutional: Positive for activity change and appetite change. Negative for fatigue and unexpected weight change.   Respiratory: Negative for cough, chest tightness and shortness of breath.    Cardiovascular: Negative for chest pain, palpitations and leg swelling.   Gastrointestinal: Negative for abdominal distention, abdominal pain, diarrhea and nausea.   Skin: Negative for color change and rash.   Neurological: Negative for dizziness, syncope, weakness and headaches.   Psychiatric/Behavioral: Negative for dysphoric mood. The patient is not nervous/anxious.        Objective     Vitals:    10/12/20 1605   BP: 128/66   Pulse: 82   Temp: 98.3 °F (36.8 °C)   SpO2: 99%     BMI 39  Physical Exam  Vitals signs and nursing note reviewed.   Constitutional:       General: She is not in acute distress.     Appearance: Normal appearance. She is well-developed. She is obese. She is not ill-appearing, toxic-appearing or diaphoretic.      Comments: Obesity noted     Neck:      Thyroid: No thyromegaly.      Vascular: No JVD.   Cardiovascular:      Rate and Rhythm: Normal rate and  regular rhythm.      Heart sounds: Normal heart sounds. No murmur.   Pulmonary:      Effort: Pulmonary effort is normal. No respiratory distress.      Breath sounds: Normal breath sounds.   Chest:      Chest wall: No tenderness.   Abdominal:      General: There is no distension.      Palpations: Abdomen is soft.      Tenderness: There is no abdominal tenderness.   Skin:     General: Skin is warm and dry.      Coloration: Skin is not pale.      Findings: No erythema.   Neurological:      Mental Status: She is alert.   Psychiatric:         Mood and Affect: Mood normal.         Behavior: Behavior normal.         Thought Content: Thought content normal.         Judgment: Judgment normal.         Assessment/Plan     Aniya was seen today for weight check.    Diagnoses and all orders for this visit:    Morbid obesity (CMS/HCC)    Essential hypertension    Continue her medications, encourage patient continue focusing on following a low-carb diet, high-protein, low calorie portion control, drinking adequate amounts of water and exercising daily.  Due to her continued struggles with morbid obesity with elevated BMI despite following a low calorie, low-carb high-protein diet I do feel that patient would be a good candidate for gastric sleeve surgery due to her comorbidity of hypertension.  Answers for HPI/ROS submitted by the patient on 10/5/2020   What is the primary reason for your visit?: Other  Please describe your symptoms.: weight check for upcoming bariatric surgery  Have you had these symptoms before?: Yes  How long have you been having these symptoms?: Greater than 2 weeks

## 2020-10-22 ENCOUNTER — EPISODE CHANGES (OUTPATIENT)
Dept: CASE MANAGEMENT | Facility: OTHER | Age: 50
End: 2020-10-22

## 2020-11-04 ENCOUNTER — HOSPITAL ENCOUNTER (OUTPATIENT)
Dept: MAMMOGRAPHY | Facility: HOSPITAL | Age: 50
Discharge: HOME | End: 2020-11-04
Admitting: OBSTETRICS & GYNECOLOGY

## 2020-11-04 DIAGNOSIS — Z12.31 VISIT FOR SCREENING MAMMOGRAM: ICD-10-CM

## 2020-11-04 PROCEDURE — 77063 BREAST TOMOSYNTHESIS BI: CPT

## 2020-11-04 PROCEDURE — 77063 BREAST TOMOSYNTHESIS BI: CPT | Performed by: RADIOLOGY

## 2020-11-04 PROCEDURE — 77067 SCR MAMMO BI INCL CAD: CPT

## 2020-11-04 PROCEDURE — 77067 SCR MAMMO BI INCL CAD: CPT | Performed by: RADIOLOGY

## 2020-12-04 PROCEDURE — U0004 COV-19 TEST NON-CDC HGH THRU: HCPCS | Performed by: FAMILY MEDICINE

## 2020-12-17 DIAGNOSIS — R53.83 FATIGUE, UNSPECIFIED TYPE: Primary | ICD-10-CM

## 2020-12-17 DIAGNOSIS — R06.00 DYSPNEA, UNSPECIFIED TYPE: ICD-10-CM

## 2020-12-29 ENCOUNTER — LAB (OUTPATIENT)
Dept: LAB | Facility: HOSPITAL | Age: 50
End: 2020-12-29

## 2020-12-29 DIAGNOSIS — R53.83 FATIGUE, UNSPECIFIED TYPE: ICD-10-CM

## 2020-12-29 DIAGNOSIS — R06.00 DYSPNEA, UNSPECIFIED TYPE: ICD-10-CM

## 2020-12-29 LAB
ALBUMIN SERPL-MCNC: 4.3 G/DL (ref 3.5–5.2)
ALBUMIN/GLOB SERPL: 2 G/DL
ALP SERPL-CCNC: 77 U/L (ref 39–117)
ALT SERPL W P-5'-P-CCNC: 25 U/L (ref 1–33)
ANION GAP SERPL CALCULATED.3IONS-SCNC: 8.6 MMOL/L (ref 5–15)
AST SERPL-CCNC: 22 U/L (ref 1–32)
BILIRUB SERPL-MCNC: 0.2 MG/DL (ref 0–1.2)
BUN SERPL-MCNC: 12 MG/DL (ref 6–20)
BUN/CREAT SERPL: 17.4 (ref 7–25)
CALCIUM SPEC-SCNC: 9.1 MG/DL (ref 8.6–10.5)
CHLORIDE SERPL-SCNC: 103 MMOL/L (ref 98–107)
CO2 SERPL-SCNC: 27.4 MMOL/L (ref 22–29)
CREAT SERPL-MCNC: 0.69 MG/DL (ref 0.57–1)
DEPRECATED RDW RBC AUTO: 40.2 FL (ref 37–54)
ERYTHROCYTE [DISTWIDTH] IN BLOOD BY AUTOMATED COUNT: 13 % (ref 12.3–15.4)
GFR SERPL CREATININE-BSD FRML MDRD: 90 ML/MIN/1.73
GLOBULIN UR ELPH-MCNC: 2.1 GM/DL
GLUCOSE SERPL-MCNC: 93 MG/DL (ref 65–99)
HCT VFR BLD AUTO: 41.1 % (ref 34–46.6)
HGB BLD-MCNC: 13.8 G/DL (ref 12–15.9)
MCH RBC QN AUTO: 28.6 PG (ref 26.6–33)
MCHC RBC AUTO-ENTMCNC: 33.6 G/DL (ref 31.5–35.7)
MCV RBC AUTO: 85.3 FL (ref 79–97)
PLATELET # BLD AUTO: 414 10*3/MM3 (ref 140–450)
PMV BLD AUTO: 10.8 FL (ref 6–12)
POTASSIUM SERPL-SCNC: 3.9 MMOL/L (ref 3.5–5.2)
PROT SERPL-MCNC: 6.4 G/DL (ref 6–8.5)
RBC # BLD AUTO: 4.82 10*6/MM3 (ref 3.77–5.28)
SODIUM SERPL-SCNC: 139 MMOL/L (ref 136–145)
WBC # BLD AUTO: 10.03 10*3/MM3 (ref 3.4–10.8)

## 2020-12-29 PROCEDURE — 36415 COLL VENOUS BLD VENIPUNCTURE: CPT

## 2020-12-29 PROCEDURE — 80053 COMPREHEN METABOLIC PANEL: CPT

## 2020-12-29 PROCEDURE — 85027 COMPLETE CBC AUTOMATED: CPT

## 2020-12-30 ENCOUNTER — HOSPITAL ENCOUNTER (OUTPATIENT)
Dept: GENERAL RADIOLOGY | Facility: HOSPITAL | Age: 50
Discharge: HOME | End: 2020-12-30
Admitting: SURGERY

## 2020-12-30 DIAGNOSIS — R06.00 DYSPNEA, UNSPECIFIED TYPE: ICD-10-CM

## 2020-12-30 DIAGNOSIS — R53.83 FATIGUE, UNSPECIFIED TYPE: ICD-10-CM

## 2020-12-30 PROCEDURE — 71046 X-RAY EXAM CHEST 2 VIEWS: CPT

## 2021-01-05 ENCOUNTER — CONSULT (OUTPATIENT)
Dept: BARIATRICS/WEIGHT MGMT | Facility: CLINIC | Age: 51
End: 2021-01-05

## 2021-01-05 VITALS
SYSTOLIC BLOOD PRESSURE: 116 MMHG | RESPIRATION RATE: 18 BRPM | TEMPERATURE: 97.8 F | DIASTOLIC BLOOD PRESSURE: 64 MMHG | HEART RATE: 77 BPM | OXYGEN SATURATION: 99 % | BODY MASS INDEX: 40.13 KG/M2 | WEIGHT: 226.5 LBS | HEIGHT: 63 IN

## 2021-01-05 DIAGNOSIS — K21.9 HIATAL HERNIA WITH GASTROESOPHAGEAL REFLUX: ICD-10-CM

## 2021-01-05 DIAGNOSIS — E66.01 MORBID OBESITY WITH BODY MASS INDEX (BMI) OF 40.0 TO 44.9 IN ADULT (HCC): Primary | ICD-10-CM

## 2021-01-05 DIAGNOSIS — K44.9 HIATAL HERNIA WITH GASTROESOPHAGEAL REFLUX: ICD-10-CM

## 2021-01-05 PROCEDURE — 99214 OFFICE O/P EST MOD 30 MIN: CPT | Performed by: SURGERY

## 2021-01-05 RX ORDER — GABAPENTIN 100 MG/1
600 CAPSULE ORAL ONCE
Status: CANCELLED | OUTPATIENT
Start: 2021-01-05 | End: 2021-01-05

## 2021-01-05 RX ORDER — ACETAMINOPHEN 500 MG
1000 TABLET ORAL ONCE
Status: CANCELLED | OUTPATIENT
Start: 2021-01-05 | End: 2021-01-05

## 2021-01-05 RX ORDER — SODIUM CHLORIDE 0.9 % (FLUSH) 0.9 %
3 SYRINGE (ML) INJECTION EVERY 12 HOURS SCHEDULED
Status: CANCELLED | OUTPATIENT
Start: 2021-01-05

## 2021-01-05 RX ORDER — SCOLOPAMINE TRANSDERMAL SYSTEM 1 MG/1
1 PATCH, EXTENDED RELEASE TRANSDERMAL ONCE
Status: CANCELLED | OUTPATIENT
Start: 2021-01-05 | End: 2021-01-05

## 2021-01-05 RX ORDER — SODIUM CHLORIDE, SODIUM LACTATE, POTASSIUM CHLORIDE, CALCIUM CHLORIDE 600; 310; 30; 20 MG/100ML; MG/100ML; MG/100ML; MG/100ML
150 INJECTION, SOLUTION INTRAVENOUS CONTINUOUS
Status: CANCELLED | OUTPATIENT
Start: 2021-01-05

## 2021-01-05 RX ORDER — SODIUM CHLORIDE 0.9 % (FLUSH) 0.9 %
3-10 SYRINGE (ML) INJECTION AS NEEDED
Status: CANCELLED | OUTPATIENT
Start: 2021-01-05

## 2021-01-05 RX ORDER — CHLORHEXIDINE GLUCONATE 0.12 MG/ML
30 RINSE ORAL
Status: CANCELLED | OUTPATIENT
Start: 2021-01-05 | End: 2021-01-05

## 2021-01-05 RX ORDER — PANTOPRAZOLE SODIUM 40 MG/10ML
40 INJECTION, POWDER, LYOPHILIZED, FOR SOLUTION INTRAVENOUS ONCE
Status: CANCELLED | OUTPATIENT
Start: 2021-01-05 | End: 2021-01-05

## 2021-01-06 PROBLEM — K21.9 HIATAL HERNIA WITH GASTROESOPHAGEAL REFLUX: Status: ACTIVE | Noted: 2021-01-06

## 2021-01-06 PROBLEM — K44.9 HIATAL HERNIA WITH GASTROESOPHAGEAL REFLUX: Status: ACTIVE | Noted: 2021-01-06

## 2021-01-09 ENCOUNTER — IMMUNIZATION (OUTPATIENT)
Dept: VACCINE CLINIC | Facility: HOSPITAL | Age: 51
End: 2021-01-09

## 2021-01-09 PROCEDURE — 0001A: CPT | Performed by: PHYSICIAN ASSISTANT

## 2021-01-09 PROCEDURE — 91300 HC SARSCOV02 VAC 30MCG/0.3ML IM: CPT | Performed by: PHYSICIAN ASSISTANT

## 2021-01-17 NOTE — PROGRESS NOTES
[facility] GROUP BARIATRIC SURGERY  [address]  [address]  [facility] [id number] [phone]      Patient  Name:  Aniya Castro  :  1970      Date of Visit: 21    Chief Complaint:  weight gain; unable to maintain weight loss.   Evaluate for possible metabolic and bariatric surgery    History of Present Illness:  Aniya Castro is a 50 y.o. female who presents today for evaluation, education and consultation regarding metabolic and bariatric surgery (MBS).  Since last seen 2020 she has gained 7-1/2 pounds.The patient returns for final visit prior to metabolic and bariatric surgery specifically the sleeve gastrectomy.  Original intake evaluation Amanda Torres PA-C dated 2020 reviewed.    She noted at that time the patient's maximum lifetime weight was 230 pounds.  She also noted that the patient has a history significant for duodenal ulcer diagnosed during ERCP in 2019, no biopsies taken and that H. pylori stool antigen was negative and has continued on daily Protonix since.  She notes that my EGD in July revealed a benign fundic gland polyp no gastritis no ulcer and a small asymmetric hiatal hernia biopsies consistent with reflux.      The patient has had issues with morbid obesity for years and only temporary success with non-surgical methods of weight loss.  The patient is seeking LSG to help with the morbid obesity related conditions of anxiety, dyspnea exertion, fatigue, hiatal hernia with GE reflux disease, hypertension, hyperlipidemia, organic mood disorder of depressed type, prediabetes with elevated hemoglobin A1c, obstructive sleep apnea.    50-year-old morbidly obese female from [place].  I did her boyfriend sleeve gastrectomy this past summer.      Past Medical History:   Diagnosis Date   • Anesthesia complication     hard time waking   • Anxiety, generalized    • Duodenal ulcer     dx during ERCP 3/2019, no bx, HPSA (-), continues on daily  Protonix since   • Dyspepsia    • Dyspnea on exertion    • Fatigue    • Hiatal hernia with gastroesophageal reflux     controlled w/ daily Protonix, EGD 20 w/ GDW revealed small asymmetric HH   • HTN (hypertension)    • Hyperlipidemia     borderline, no meds   • Morbid obesity (CMS/HCC)    •  (normal spontaneous vaginal delivery)     x 2 without complics   • Organic mood disorder of depressed type    • Prediabetes     A1C 6.1   • Sleep apnea with use of continuous positive airway pressure (CPAP)     compliant with machine    • Wears glasses      Past Surgical History:   Procedure Laterality Date   • ADENOIDECTOMY     •  SECTION     • CHOLECYSTECTOMY WITH INTRAOPERATIVE CHOLANGIOGRAM N/A 3/5/2019    Procedure: CHOLECYSTECTOMY LAPAROSCOPIC WITH IOC;  Surgeon: Wali Nicole MD;  Location:  [facility];  Service: General   • EAR TUBES     • ERCP N/A 3/6/2019    Procedure: ENDOSCOPIC RETROGRADE CHOLANGIOPANCREATOGRAPHY;  Surgeon: Todd Campos MD;  Location:  [facility];  Service: Gastroenterology   • INTRAUTERINE DEVICE INSERTION      due for removal   • WISDOM TOOTH EXTRACTION         Allergies   Allergen Reactions   • Sulfabenzamide Hives     generalized   • Sulfa Antibiotics Hives       Current Outpatient Medications:   •  Biotin 2.5 MG tablet, Take 2.5 mg by mouth Daily., Disp: , Rfl:   •  docusate sodium 100 MG capsule, Take 100 mg by mouth 2 (Two) Times a Day., Disp: 20 each, Rfl: 0  •  FLUoxetine (PROzac) 20 MG capsule, Take 2 capsules by mouth Daily., Disp: 60 capsule, Rfl: 11  •  loratadine-pseudoephedrine (CLARITIN-D 24-hour)  MG per 24 hr tablet, Take 1 tablet by mouth Daily., Disp: 15 tablet, Rfl: 0  •  Multiple Vitamin (MULTI VITAMIN DAILY PO), Take 1 capsule by mouth Daily., Disp: , Rfl:   •  pantoprazole (PROTONIX) 40 MG EC tablet, Take 1 tablet by mouth Daily., Disp: 90 tablet, Rfl: 3  •  triamterene-hydrochlorothiazide (MAXZIDE-25) 37.5-25 MG per  tablet, Take 1 tablet by mouth Daily., Disp: 30 tablet, Rfl: 5    Social History     Socioeconomic History   • Marital status:      Spouse name: Not on file   • Number of children: Not on file   • Years of education: Not on file   • Highest education level: Not on file   Tobacco Use   • Smoking status: Never Smoker   • Smokeless tobacco: Never Used   Substance and Sexual Activity   • Alcohol use: Yes     Comment: Occassional   • Drug use: No   • Sexual activity: Defer   Social History Narrative    Lives in [place], [place] but moving to [facility] w/ her boyfriend.  She has 3 children aged: 29, 27, and 26.  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       Family History   Problem Relation Age of Onset   • Hypertension Mother    • Obesity Mother    • Heart disease Father    • Heart attack Father    • Stroke Maternal Grandmother    • Hypertension Maternal Grandmother    • Diabetes Maternal Grandmother    • Diabetes Maternal Grandfather    • Hypertension Maternal Grandfather    • Stroke Maternal Grandfather    • Hypertension Paternal Grandmother    • Heart attack Paternal Grandmother    • Hypertension Paternal Grandfather    • Breast cancer Neg Hx    • Ovarian cancer Neg Hx        Review of Systems   Constitutional: Positive for fatigue and unexpected weight gain. Negative for chills, diaphoresis, fever and unexpected weight loss.   HENT: Negative for congestion and facial swelling.    Eyes: Negative for blurred vision, double vision and discharge.   Respiratory: Negative for chest tightness, shortness of breath and stridor.    Cardiovascular: Negative for chest pain, palpitations and leg swelling.   Gastrointestinal: Positive for GERD. Negative for blood in stool.   Endocrine: Negative for polydipsia.   Genitourinary: Negative for hematuria.   Musculoskeletal: Positive for arthralgias.   Skin: Negative for color change.   Allergic/Immunologic: Negative for immunocompromised  state.   Neurological: Negative for confusion.   Psychiatric/Behavioral: Negative for self-injury.       I have reviewed the ROS and confirm that it's accurate today.    Physical Exam:  Vital Signs:  Weight: 103 kg (226 lb 8 oz)   Body mass index is 40.77 kg/m².  Temp: 97.8 °F (36.6 °C)   Heart Rate: 77   BP: 116/64     Physical Exam  Vitals signs reviewed.   Constitutional:       Appearance: She is well-developed.   HENT:      Head: Normocephalic and atraumatic.      Nose:      Comments: mask  Eyes:      Conjunctiva/sclera: Conjunctivae normal.      Pupils: Pupils are equal, round, and reactive to light.   Neck:      Musculoskeletal: Normal range of motion and neck supple.      Thyroid: No thyromegaly.      Vascular: No carotid bruit.      Trachea: No tracheal deviation.   Cardiovascular:      Rate and Rhythm: Normal rate and regular rhythm.      Heart sounds: Normal heart sounds.   Pulmonary:      Effort: Pulmonary effort is normal. No respiratory distress.      Breath sounds: Normal breath sounds.   Abdominal:      General: There is no distension.      Palpations: Abdomen is soft.      Tenderness: There is no abdominal tenderness.      Comments: Laparoscopy scars with a supraumbilical transverse scar, low transverse scar   Musculoskeletal: Normal range of motion.         General: No deformity.   Skin:     General: Skin is warm and dry.      Findings: No rash.   Neurological:      Mental Status: She is alert and oriented to person, place, and time.      Cranial Nerves: No cranial nerve deficit.      Coordination: Coordination normal.   Psychiatric:         Behavior: Behavior normal.         Thought Content: Thought content normal.         Judgment: Judgment normal.         Patient Active Problem List   Diagnosis   • Organic mood disorder of depressed type   • HTN (hypertension)   • Anxiety, generalized   • Prediabetes   • Hyperlipidemia   • Fatigue   • Dyspepsia   • Dyspnea on exertion   • Morbid obesity (CMS/HCC)    • Sleep apnea with use of continuous positive airway pressure (CPAP)   • Anesthesia complication   • Heartburn   • Duodenal ulcer   • Morbid obesity with body mass index (BMI) of 40.0 to 44.9 in adult (CMS/[facility])   • Hiatal hernia with gastroesophageal reflux       Assessment:    Aniya Castro is a 50 y.o. year old female with medically complicated obesity.    Metabolic and bariatric surgery is deemed medically necessary given the following obesity related comorbidities including anxiety, dyspnea exertion, fatigue, hiatal hernia with GE reflux disease, hypertension, hyperlipidemia, organic mood disorder of depressed type, prediabetes with elevated hemoglobin A1c, obstructive sleep apnea with current Weight: 103 kg (226 lb 8 oz) and Body mass index is 40.77 kg/m²..    Encounter Diagnoses   Name Primary?   • Morbid obesity with body mass index (BMI) of 40.0 to 44.9 in adult (CMS/[facility]) Yes   • Hiatal hernia with gastroesophageal reflux       Patient is aware that surgery is a tool, and that weight loss and improvement in comorbidities is not guaranteed but only seen in the context of appropriate use, follow up and physical activity.    The patient was present for an approximately a 2.5 hour discussion of the purpose of MBS, how MBS is a tool to assist in achieving weight loss goals, the most common complications and how best to avoid them, and the strategies for short and long term weight loss and improvement in comorbidities.  Ample opportunity to discuss questions was available both in group and during the time of individual examination.    I reviewed her Eduard report showing phentermine x1.  EKG dated 5/7/2020 normal sinus rhythm psychosocial evaluation dated 5/19/2020 and 6/1/2020 Jay Baumann, PhD appropriate candidate.  Dietitian evaluation dated 9/9/2020 Leona Wan RD.  EGD dated 7/6/2020 showing a benign-appearing fundic gland polyp on a stalk probably 7 mm in roundish a few centimeters from the GE  "junction.  Thickened mucosal folds without gastritis.  No visible ulcers or deformity of the pylorus.  Small asymmetric hiatal hernia Z-line 39 cm.  I noted at that time that I did her boyfriend sleeve gastrectomy a couple of weeks ago.  I noted that she is asymptomatic on proton pump inhibitors no dysphagia.  Pathology of the antrum showed reactive gastropathy negative for H. pylori.  The fundic polyp biopsies were consistent with benign fundic gland polyp negative for H. pylori metaplasia adenomatous change or neoplasia.  Distal esophageal biopsies were compatible with reflux no Watson's.  ERCP Dr. Martin Campos dated 3/6/2019.  It showed a duodenal ulcer with a clean base and minor outlet stricture.  Negative H. pylori stool antigen dated 4/9/2019.  Labs dated 4/7/2020 showing a normal TSH elevated hemoglobin A1c 6.10, elevated cholesterol 204 elevated triglycerides 311 low HDL 35 high  high VLDL 62.2.  Normal CMP except for a glucose of 102, normal CBC with an H&H of 14.2 and 41.2.  Cardiology clearance Jose Armenta MD dated 5/6/2020 please see scanned records that I have reviewed and signed off on today.  All of this in addition to the patient's unique history and exam has been taken into consideration in determining their appropriate candidacy for MBS.    Complications  of laparoscopic/possible robotic gastric sleeve were discussed. The patient is well aware of the potential complications of surgery that include but not limited to bleeding, infections, deep venous thrombosis, pulmonary embolism, pulmonary complications such as pneumonia, cardiac events, hernias, small bowel obstruction, damage to the spleen or other organs, bowel injury, disfiguring scars, failure to lose weight, need for additional surgery, conversion to an open procedure, and death. Patient is also aware of complications which apply in this particular procedure that can include but are not limited to a \"leak\" at the staple line which in " some instances may require conversion to gastric bypass.    The patient is aware if a hiatal hernia is encountered, it likely will be repaired.  R/B/A Rx to hiatal hernia repair were discussed as outlined in our long consent form.  Briefly risks in addition to those for LSG include recurrent hernia, DWAYNE, dysphagia, esophageal injury, pneumothorax, injury to the vagus nerves, injury to the thoracic duct, aorta or vena cava.    I discussed avoiding all tobacco products and second hand smoke at least 2 weeks pre-operatively and 6 weeks post-operatively to minimize the risk of sleeve leak.  This included discussing the importance of avoiding even secondhand smoke as the risk of leak is increased.  Examples discussed:  I made it very clear that the patient understands they should avoid even riding in a car where someone has previously smoked in the last 2 weeks, living in a house where someone smokes (even if it's in a separate room/patio/attached garage, etc.) we discussed that they should not have a conversation with a group of people who are smoking even if it's outside.  They can be around wood burning fires and barbecue.  I told them I do not know if marijuana has a same effects but my overall recommendation is to avoid it for 2 weeks prior in 6 weeks after surgery.  They also are aware that nicotine may also increase the risk of leak and I strongly encouraged him to avoid that as well for 2 weeks prior in 6 weeks after surgery.    Discussed the risks, benefits and alternative therapies at great length as outlined in our extensive consent forms, consent videos, and educational teaching process under the direction of the center's .    A copy of the patient's signed informed consent is on file.    R/B/A Rx discussed to postop anticoagulation incl but not limited to bleeding, drug reaction, venothromboembolic events, etc. and the patient declined.        Plan: After evaluation today I think the  patient is a reasonable candidate for laparoscopic sleeve gastrectomy, possible hiatal hernia repair and EGD.  Type and screen.  Other issues include anxiety, dyspnea exertion, fatigue, hypertension, hyperlipidemia, organic mood disorder of depressed type, prediabetes with elevated hemoglobin A1c, obstructive sleep apnea.  Thank you Temi Rangel and Dr. Duggan for the opportunity evaluate Ms. Castro.        Bandar Rao MD

## 2021-01-26 ENCOUNTER — APPOINTMENT (OUTPATIENT)
Dept: PREADMISSION TESTING | Facility: HOSPITAL | Age: 51
End: 2021-01-26

## 2021-01-26 DIAGNOSIS — Z00.00 WELLNESS EXAMINATION: ICD-10-CM

## 2021-01-26 DIAGNOSIS — K21.9 HIATAL HERNIA WITH GASTROESOPHAGEAL REFLUX: ICD-10-CM

## 2021-01-26 DIAGNOSIS — E66.01 MORBID OBESITY WITH BODY MASS INDEX (BMI) OF 40.0 TO 44.9 IN ADULT (HCC): ICD-10-CM

## 2021-01-26 DIAGNOSIS — K44.9 HIATAL HERNIA WITH GASTROESOPHAGEAL REFLUX: ICD-10-CM

## 2021-01-26 LAB
ABO GROUP BLD: NORMAL
BASOPHILS # BLD AUTO: 0.08 10*3/MM3 (ref 0–0.2)
BASOPHILS NFR BLD AUTO: 0.8 % (ref 0–1.5)
BLD GP AB SCN SERPL QL: NEGATIVE
DEPRECATED RDW RBC AUTO: 43.7 FL (ref 37–54)
EOSINOPHIL # BLD AUTO: 0.42 10*3/MM3 (ref 0–0.4)
EOSINOPHIL NFR BLD AUTO: 4 % (ref 0.3–6.2)
ERYTHROCYTE [DISTWIDTH] IN BLOOD BY AUTOMATED COUNT: 13.4 % (ref 12.3–15.4)
HBA1C MFR BLD: 6.2 % (ref 4.8–5.6)
HCT VFR BLD AUTO: 41.2 % (ref 34–46.6)
HGB BLD-MCNC: 13.7 G/DL (ref 12–15.9)
IMM GRANULOCYTES # BLD AUTO: 0.03 10*3/MM3 (ref 0–0.05)
IMM GRANULOCYTES NFR BLD AUTO: 0.3 % (ref 0–0.5)
LYMPHOCYTES # BLD AUTO: 3.15 10*3/MM3 (ref 0.7–3.1)
LYMPHOCYTES NFR BLD AUTO: 29.9 % (ref 19.6–45.3)
MCH RBC QN AUTO: 29.6 PG (ref 26.6–33)
MCHC RBC AUTO-ENTMCNC: 33.3 G/DL (ref 31.5–35.7)
MCV RBC AUTO: 89 FL (ref 79–97)
MONOCYTES # BLD AUTO: 0.82 10*3/MM3 (ref 0.1–0.9)
MONOCYTES NFR BLD AUTO: 7.8 % (ref 5–12)
NEUTROPHILS NFR BLD AUTO: 57.2 % (ref 42.7–76)
NEUTROPHILS NFR BLD AUTO: 6.02 10*3/MM3 (ref 1.7–7)
NRBC BLD AUTO-RTO: 0 /100 WBC (ref 0–0.2)
PLATELET # BLD AUTO: 392 10*3/MM3 (ref 140–450)
PMV BLD AUTO: 10.5 FL (ref 6–12)
POTASSIUM SERPL-SCNC: 3.8 MMOL/L (ref 3.5–5.2)
QT INTERVAL: 390 MS
QTC INTERVAL: 449 MS
RBC # BLD AUTO: 4.63 10*6/MM3 (ref 3.77–5.28)
RH BLD: POSITIVE
SARS-COV-2 RNA RESP QL NAA+PROBE: NOT DETECTED
T&S EXPIRATION DATE: NORMAL
WBC # BLD AUTO: 10.52 10*3/MM3 (ref 3.4–10.8)

## 2021-01-26 PROCEDURE — 93005 ELECTROCARDIOGRAM TRACING: CPT

## 2021-01-26 PROCEDURE — 36415 COLL VENOUS BLD VENIPUNCTURE: CPT

## 2021-01-26 PROCEDURE — U0004 COV-19 TEST NON-CDC HGH THRU: HCPCS

## 2021-01-26 PROCEDURE — 83036 HEMOGLOBIN GLYCOSYLATED A1C: CPT

## 2021-01-26 PROCEDURE — 85025 COMPLETE CBC W/AUTO DIFF WBC: CPT

## 2021-01-26 PROCEDURE — 86901 BLOOD TYPING SEROLOGIC RH(D): CPT

## 2021-01-26 PROCEDURE — 86900 BLOOD TYPING SEROLOGIC ABO: CPT

## 2021-01-26 PROCEDURE — 86850 RBC ANTIBODY SCREEN: CPT

## 2021-01-26 PROCEDURE — 84132 ASSAY OF SERUM POTASSIUM: CPT

## 2021-01-26 PROCEDURE — 93010 ELECTROCARDIOGRAM REPORT: CPT | Performed by: INTERNAL MEDICINE

## 2021-01-26 PROCEDURE — C9803 HOPD COVID-19 SPEC COLLECT: HCPCS

## 2021-01-27 ENCOUNTER — ANESTHESIA EVENT (OUTPATIENT)
Dept: PERIOP | Facility: HOSPITAL | Age: 51
End: 2021-01-27

## 2021-01-27 RX ORDER — FAMOTIDINE 20 MG/1
20 TABLET, FILM COATED ORAL ONCE
Status: CANCELLED | OUTPATIENT
Start: 2021-01-27 | End: 2021-01-27

## 2021-01-27 RX ORDER — FAMOTIDINE 10 MG/ML
20 INJECTION, SOLUTION INTRAVENOUS ONCE
Status: CANCELLED | OUTPATIENT
Start: 2021-01-27 | End: 2021-01-27

## 2021-01-27 RX ORDER — SODIUM CHLORIDE 0.9 % (FLUSH) 0.9 %
10 SYRINGE (ML) INJECTION EVERY 12 HOURS SCHEDULED
Status: CANCELLED | OUTPATIENT
Start: 2021-01-27

## 2021-01-27 RX ORDER — SODIUM CHLORIDE, SODIUM LACTATE, POTASSIUM CHLORIDE, CALCIUM CHLORIDE 600; 310; 30; 20 MG/100ML; MG/100ML; MG/100ML; MG/100ML
9 INJECTION, SOLUTION INTRAVENOUS CONTINUOUS
Status: CANCELLED | OUTPATIENT
Start: 2021-01-27

## 2021-01-27 RX ORDER — SODIUM CHLORIDE 0.9 % (FLUSH) 0.9 %
10 SYRINGE (ML) INJECTION AS NEEDED
Status: CANCELLED | OUTPATIENT
Start: 2021-01-27

## 2021-01-28 ENCOUNTER — ANESTHESIA (OUTPATIENT)
Dept: PERIOP | Facility: HOSPITAL | Age: 51
End: 2021-01-28

## 2021-01-28 ENCOUNTER — HOSPITAL ENCOUNTER (INPATIENT)
Facility: HOSPITAL | Age: 51
LOS: 1 days | Discharge: HOME | End: 2021-01-29
Attending: SURGERY | Admitting: SURGERY

## 2021-01-28 DIAGNOSIS — K21.9 HIATAL HERNIA WITH GASTROESOPHAGEAL REFLUX: ICD-10-CM

## 2021-01-28 DIAGNOSIS — K44.9 HIATAL HERNIA WITH GASTROESOPHAGEAL REFLUX: ICD-10-CM

## 2021-01-28 DIAGNOSIS — E66.01 MORBID OBESITY WITH BODY MASS INDEX (BMI) OF 40.0 TO 44.9 IN ADULT (HCC): ICD-10-CM

## 2021-01-28 LAB
ABO GROUP BLD: NORMAL
B-HCG UR QL: NEGATIVE
GLUCOSE BLDC GLUCOMTR-MCNC: 107 MG/DL (ref 70–130)
INTERNAL NEGATIVE CONTROL: NEGATIVE
INTERNAL POSITIVE CONTROL: POSITIVE
Lab: NORMAL
RH BLD: POSITIVE

## 2021-01-28 PROCEDURE — 25010000003 LIDOCAINE 1 % SOLUTION: Performed by: NURSE ANESTHETIST, CERTIFIED REGISTERED

## 2021-01-28 PROCEDURE — 0DB64Z3 EXCISION OF STOMACH, PERCUTANEOUS ENDOSCOPIC APPROACH, VERTICAL: ICD-10-PCS | Performed by: SURGERY

## 2021-01-28 PROCEDURE — 25010000002 FENTANYL CITRATE (PF) 100 MCG/2ML SOLUTION: Performed by: NURSE ANESTHETIST, CERTIFIED REGISTERED

## 2021-01-28 PROCEDURE — 25010000002 NEOSTIGMINE 10 MG/10ML SOLUTION: Performed by: NURSE ANESTHETIST, CERTIFIED REGISTERED

## 2021-01-28 PROCEDURE — 43775 LAP SLEEVE GASTRECTOMY: CPT | Performed by: SURGERY

## 2021-01-28 PROCEDURE — 25010000002 ONDANSETRON PER 1 MG: Performed by: NURSE ANESTHETIST, CERTIFIED REGISTERED

## 2021-01-28 PROCEDURE — 0DJ08ZZ INSPECTION OF UPPER INTESTINAL TRACT, VIA NATURAL OR ARTIFICIAL OPENING ENDOSCOPIC: ICD-10-PCS | Performed by: SURGERY

## 2021-01-28 PROCEDURE — 82962 GLUCOSE BLOOD TEST: CPT

## 2021-01-28 PROCEDURE — 86900 BLOOD TYPING SEROLOGIC ABO: CPT

## 2021-01-28 PROCEDURE — 86901 BLOOD TYPING SEROLOGIC RH(D): CPT

## 2021-01-28 PROCEDURE — 25010000003 CEFAZOLIN IN DEXTROSE 2-4 GM/100ML-% SOLUTION: Performed by: SURGERY

## 2021-01-28 PROCEDURE — 0BQT4ZZ REPAIR DIAPHRAGM, PERCUTANEOUS ENDOSCOPIC APPROACH: ICD-10-PCS | Performed by: SURGERY

## 2021-01-28 PROCEDURE — 25010000002 ENOXAPARIN PER 10 MG: Performed by: SURGERY

## 2021-01-28 PROCEDURE — 81025 URINE PREGNANCY TEST: CPT | Performed by: ANESTHESIOLOGY

## 2021-01-28 PROCEDURE — 25010000002 MIDAZOLAM PER 1 MG: Performed by: ANESTHESIOLOGY

## 2021-01-28 PROCEDURE — 25010000002 PROPOFOL 10 MG/ML EMULSION: Performed by: NURSE ANESTHETIST, CERTIFIED REGISTERED

## 2021-01-28 PROCEDURE — 25010000002 CEFAZOLIN PER 500 MG: Performed by: SURGERY

## 2021-01-28 PROCEDURE — 88307 TISSUE EXAM BY PATHOLOGIST: CPT | Performed by: SURGERY

## 2021-01-28 PROCEDURE — 25010000002 BUPRENORPHINE PER 0.1 MG: Performed by: ANESTHESIOLOGY

## 2021-01-28 PROCEDURE — 25010000002 DEXAMETHASONE SODIUM PHOSPHATE 10 MG/ML SOLUTION: Performed by: ANESTHESIOLOGY

## 2021-01-28 PROCEDURE — 25010000002 DEXAMETHASONE SODIUM PHOSPHATE 10 MG/ML SOLUTION: Performed by: NURSE ANESTHETIST, CERTIFIED REGISTERED

## 2021-01-28 PROCEDURE — 25010000002 ONDANSETRON PER 1 MG: Performed by: SURGERY

## 2021-01-28 DEVICE — ABSORBABLE WOUND CLOSURE DEVICE
Type: IMPLANTABLE DEVICE | Site: STOMACH | Status: FUNCTIONAL
Brand: SYNETURE

## 2021-01-28 DEVICE — BLACK REINFORCED INTELLIGENT RELOAD, FOR USE WITH SIGNIA STAPLING SYSTEM
Type: IMPLANTABLE DEVICE | Site: STOMACH | Status: FUNCTIONAL
Brand: TRI-STAPLE 2.0

## 2021-01-28 DEVICE — REINFORCED INTELLIGENT RELOAD, FOR USE WITH SIGNIA STAPLING SYSTEM
Type: IMPLANTABLE DEVICE | Site: STOMACH | Status: FUNCTIONAL
Brand: TRI-STAPLE 2.0

## 2021-01-28 RX ORDER — NALOXONE HCL 0.4 MG/ML
0.1 VIAL (ML) INJECTION
Status: DISCONTINUED | OUTPATIENT
Start: 2021-01-28 | End: 2021-01-29 | Stop reason: HOSPADM

## 2021-01-28 RX ORDER — SODIUM CHLORIDE, SODIUM LACTATE, POTASSIUM CHLORIDE, CALCIUM CHLORIDE 600; 310; 30; 20 MG/100ML; MG/100ML; MG/100ML; MG/100ML
150 INJECTION, SOLUTION INTRAVENOUS CONTINUOUS
Status: DISCONTINUED | OUTPATIENT
Start: 2021-01-28 | End: 2021-01-28 | Stop reason: HOSPADM

## 2021-01-28 RX ORDER — SIMETHICONE 80 MG
80 TABLET,CHEWABLE ORAL 4 TIMES DAILY PRN
Status: DISCONTINUED | OUTPATIENT
Start: 2021-01-28 | End: 2021-01-29 | Stop reason: HOSPADM

## 2021-01-28 RX ORDER — LIDOCAINE HYDROCHLORIDE 10 MG/ML
0.5 INJECTION, SOLUTION EPIDURAL; INFILTRATION; INTRACAUDAL; PERINEURAL ONCE AS NEEDED
Status: DISCONTINUED | OUTPATIENT
Start: 2021-01-28 | End: 2021-01-28 | Stop reason: HOSPADM

## 2021-01-28 RX ORDER — BUPIVACAINE HYDROCHLORIDE 2.5 MG/ML
INJECTION, SOLUTION EPIDURAL; INFILTRATION; INTRACAUDAL
Status: COMPLETED | OUTPATIENT
Start: 2021-01-28 | End: 2021-01-28

## 2021-01-28 RX ORDER — SODIUM CHLORIDE, SODIUM LACTATE, POTASSIUM CHLORIDE, CALCIUM CHLORIDE 600; 310; 30; 20 MG/100ML; MG/100ML; MG/100ML; MG/100ML
150 INJECTION, SOLUTION INTRAVENOUS CONTINUOUS
Status: DISCONTINUED | OUTPATIENT
Start: 2021-01-28 | End: 2021-01-29 | Stop reason: HOSPADM

## 2021-01-28 RX ORDER — HYDRALAZINE HYDROCHLORIDE 20 MG/ML
10 INJECTION INTRAMUSCULAR; INTRAVENOUS
Status: DISCONTINUED | OUTPATIENT
Start: 2021-01-28 | End: 2021-01-29 | Stop reason: HOSPADM

## 2021-01-28 RX ORDER — DEXAMETHASONE SODIUM PHOSPHATE 10 MG/ML
INJECTION, SOLUTION INTRAMUSCULAR; INTRAVENOUS
Status: COMPLETED | OUTPATIENT
Start: 2021-01-28 | End: 2021-01-28

## 2021-01-28 RX ORDER — MIDAZOLAM HYDROCHLORIDE 1 MG/ML
1 INJECTION INTRAMUSCULAR; INTRAVENOUS
Status: DISCONTINUED | OUTPATIENT
Start: 2021-01-28 | End: 2021-01-28 | Stop reason: HOSPADM

## 2021-01-28 RX ORDER — METOCLOPRAMIDE HYDROCHLORIDE 5 MG/ML
10 INJECTION INTRAMUSCULAR; INTRAVENOUS EVERY 6 HOURS PRN
Status: DISCONTINUED | OUTPATIENT
Start: 2021-01-28 | End: 2021-01-29 | Stop reason: HOSPADM

## 2021-01-28 RX ORDER — PANTOPRAZOLE SODIUM 40 MG/10ML
40 INJECTION, POWDER, LYOPHILIZED, FOR SOLUTION INTRAVENOUS
Status: DISCONTINUED | OUTPATIENT
Start: 2021-01-29 | End: 2021-01-29 | Stop reason: HOSPADM

## 2021-01-28 RX ORDER — ACETAMINOPHEN 500 MG
1000 TABLET ORAL ONCE
Status: COMPLETED | OUTPATIENT
Start: 2021-01-28 | End: 2021-01-28

## 2021-01-28 RX ORDER — ALPRAZOLAM 0.25 MG/1
0.25 TABLET ORAL ONCE AS NEEDED
Status: DISCONTINUED | OUTPATIENT
Start: 2021-01-28 | End: 2021-01-29 | Stop reason: HOSPADM

## 2021-01-28 RX ORDER — LORAZEPAM 1 MG/1
1 TABLET ORAL EVERY 12 HOURS PRN
Status: DISCONTINUED | OUTPATIENT
Start: 2021-01-28 | End: 2021-01-29 | Stop reason: HOSPADM

## 2021-01-28 RX ORDER — LIDOCAINE HYDROCHLORIDE 20 MG/ML
JELLY TOPICAL AS NEEDED
Status: DISCONTINUED | OUTPATIENT
Start: 2021-01-28 | End: 2021-01-28 | Stop reason: SURG

## 2021-01-28 RX ORDER — PROMETHAZINE HYDROCHLORIDE 25 MG/1
25 TABLET ORAL ONCE AS NEEDED
Status: DISCONTINUED | OUTPATIENT
Start: 2021-01-28 | End: 2021-01-28 | Stop reason: HOSPADM

## 2021-01-28 RX ORDER — MAGNESIUM HYDROXIDE 1200 MG/15ML
LIQUID ORAL AS NEEDED
Status: DISCONTINUED | OUTPATIENT
Start: 2021-01-28 | End: 2021-01-28 | Stop reason: HOSPADM

## 2021-01-28 RX ORDER — CHLORHEXIDINE GLUCONATE 0.12 MG/ML
30 RINSE ORAL
Status: DISCONTINUED | OUTPATIENT
Start: 2021-01-28 | End: 2021-01-28

## 2021-01-28 RX ORDER — NEOSTIGMINE METHYLSULFATE 1 MG/ML
INJECTION, SOLUTION INTRAVENOUS AS NEEDED
Status: DISCONTINUED | OUTPATIENT
Start: 2021-01-28 | End: 2021-01-28 | Stop reason: SURG

## 2021-01-28 RX ORDER — GABAPENTIN 300 MG/1
600 CAPSULE ORAL ONCE
Status: COMPLETED | OUTPATIENT
Start: 2021-01-28 | End: 2021-01-28

## 2021-01-28 RX ORDER — MIDAZOLAM HYDROCHLORIDE 1 MG/ML
2 INJECTION INTRAMUSCULAR; INTRAVENOUS
Status: DISCONTINUED | OUTPATIENT
Start: 2021-01-28 | End: 2021-01-28 | Stop reason: HOSPADM

## 2021-01-28 RX ORDER — ONDANSETRON 2 MG/ML
INJECTION INTRAMUSCULAR; INTRAVENOUS AS NEEDED
Status: DISCONTINUED | OUTPATIENT
Start: 2021-01-28 | End: 2021-01-28 | Stop reason: SURG

## 2021-01-28 RX ORDER — ONDANSETRON 2 MG/ML
4 INJECTION INTRAMUSCULAR; INTRAVENOUS EVERY 6 HOURS
Status: COMPLETED | OUTPATIENT
Start: 2021-01-28 | End: 2021-01-29

## 2021-01-28 RX ORDER — FENTANYL CITRATE 50 UG/ML
INJECTION, SOLUTION INTRAMUSCULAR; INTRAVENOUS AS NEEDED
Status: DISCONTINUED | OUTPATIENT
Start: 2021-01-28 | End: 2021-01-28 | Stop reason: SURG

## 2021-01-28 RX ORDER — HYDROMORPHONE HYDROCHLORIDE 2 MG/1
2 TABLET ORAL EVERY 4 HOURS PRN
Status: DISCONTINUED | OUTPATIENT
Start: 2021-01-28 | End: 2021-01-29 | Stop reason: HOSPADM

## 2021-01-28 RX ORDER — ROCURONIUM BROMIDE 10 MG/ML
INJECTION, SOLUTION INTRAVENOUS AS NEEDED
Status: DISCONTINUED | OUTPATIENT
Start: 2021-01-28 | End: 2021-01-28 | Stop reason: SURG

## 2021-01-28 RX ORDER — LORAZEPAM 2 MG/ML
0.5 INJECTION INTRAMUSCULAR EVERY 12 HOURS PRN
Status: DISCONTINUED | OUTPATIENT
Start: 2021-01-28 | End: 2021-01-29 | Stop reason: HOSPADM

## 2021-01-28 RX ORDER — LIDOCAINE HYDROCHLORIDE 10 MG/ML
INJECTION, SOLUTION INFILTRATION; PERINEURAL AS NEEDED
Status: DISCONTINUED | OUTPATIENT
Start: 2021-01-28 | End: 2021-01-28 | Stop reason: SURG

## 2021-01-28 RX ORDER — PROCHLORPERAZINE MALEATE 10 MG
10 TABLET ORAL EVERY 6 HOURS PRN
Status: DISCONTINUED | OUTPATIENT
Start: 2021-01-28 | End: 2021-01-29 | Stop reason: HOSPADM

## 2021-01-28 RX ORDER — GLYCOPYRROLATE 0.2 MG/ML
INJECTION INTRAMUSCULAR; INTRAVENOUS AS NEEDED
Status: DISCONTINUED | OUTPATIENT
Start: 2021-01-28 | End: 2021-01-28 | Stop reason: SURG

## 2021-01-28 RX ORDER — ALBUTEROL SULFATE 2.5 MG/3ML
2.5 SOLUTION RESPIRATORY (INHALATION) EVERY 4 HOURS PRN
Status: DISCONTINUED | OUTPATIENT
Start: 2021-01-28 | End: 2021-01-29 | Stop reason: HOSPADM

## 2021-01-28 RX ORDER — OXYCODONE HYDROCHLORIDE 5 MG/1
5 TABLET ORAL EVERY 6 HOURS PRN
Status: DISCONTINUED | OUTPATIENT
Start: 2021-01-28 | End: 2021-01-29 | Stop reason: HOSPADM

## 2021-01-28 RX ORDER — PROPOFOL 10 MG/ML
VIAL (ML) INTRAVENOUS AS NEEDED
Status: DISCONTINUED | OUTPATIENT
Start: 2021-01-28 | End: 2021-01-28 | Stop reason: SURG

## 2021-01-28 RX ORDER — DEXAMETHASONE SODIUM PHOSPHATE 10 MG/ML
INJECTION, SOLUTION INTRAMUSCULAR; INTRAVENOUS AS NEEDED
Status: DISCONTINUED | OUTPATIENT
Start: 2021-01-28 | End: 2021-01-28 | Stop reason: SURG

## 2021-01-28 RX ORDER — FENTANYL CITRATE 50 UG/ML
50 INJECTION, SOLUTION INTRAMUSCULAR; INTRAVENOUS
Status: DISCONTINUED | OUTPATIENT
Start: 2021-01-28 | End: 2021-01-28 | Stop reason: HOSPADM

## 2021-01-28 RX ORDER — MORPHINE SULFATE 4 MG/ML
4 INJECTION, SOLUTION INTRAMUSCULAR; INTRAVENOUS
Status: DISCONTINUED | OUTPATIENT
Start: 2021-01-28 | End: 2021-01-29 | Stop reason: HOSPADM

## 2021-01-28 RX ORDER — SODIUM CHLORIDE AND POTASSIUM CHLORIDE 150; 450 MG/100ML; MG/100ML
125 INJECTION, SOLUTION INTRAVENOUS CONTINUOUS
Status: DISCONTINUED | OUTPATIENT
Start: 2021-01-29 | End: 2021-01-29 | Stop reason: HOSPADM

## 2021-01-28 RX ORDER — CYANOCOBALAMIN 1000 UG/ML
1000 INJECTION, SOLUTION INTRAMUSCULAR; SUBCUTANEOUS ONCE
Status: COMPLETED | OUTPATIENT
Start: 2021-01-29 | End: 2021-01-29

## 2021-01-28 RX ORDER — HYDROMORPHONE HYDROCHLORIDE 1 MG/ML
0.5 INJECTION, SOLUTION INTRAMUSCULAR; INTRAVENOUS; SUBCUTANEOUS
Status: DISCONTINUED | OUTPATIENT
Start: 2021-01-28 | End: 2021-01-28 | Stop reason: HOSPADM

## 2021-01-28 RX ORDER — BUPRENORPHINE HYDROCHLORIDE 0.32 MG/ML
INJECTION INTRAMUSCULAR; INTRAVENOUS
Status: COMPLETED | OUTPATIENT
Start: 2021-01-28 | End: 2021-01-28

## 2021-01-28 RX ORDER — ONDANSETRON 4 MG/1
4 TABLET, FILM COATED ORAL EVERY 6 HOURS PRN
Status: DISCONTINUED | OUTPATIENT
Start: 2021-01-29 | End: 2021-01-29 | Stop reason: HOSPADM

## 2021-01-28 RX ORDER — CEFAZOLIN SODIUM 2 G/100ML
2 INJECTION, SOLUTION INTRAVENOUS ONCE
Status: COMPLETED | OUTPATIENT
Start: 2021-01-28 | End: 2021-01-28

## 2021-01-28 RX ORDER — SCOLOPAMINE TRANSDERMAL SYSTEM 1 MG/1
1 PATCH, EXTENDED RELEASE TRANSDERMAL ONCE
Status: DISCONTINUED | OUTPATIENT
Start: 2021-01-28 | End: 2021-01-28

## 2021-01-28 RX ORDER — ONDANSETRON 2 MG/ML
4 INJECTION INTRAMUSCULAR; INTRAVENOUS ONCE AS NEEDED
Status: DISCONTINUED | OUTPATIENT
Start: 2021-01-28 | End: 2021-01-28 | Stop reason: HOSPADM

## 2021-01-28 RX ORDER — FLUOXETINE HYDROCHLORIDE 20 MG/1
40 CAPSULE ORAL DAILY
Status: DISCONTINUED | OUTPATIENT
Start: 2021-01-28 | End: 2021-01-29 | Stop reason: HOSPADM

## 2021-01-28 RX ORDER — EPHEDRINE SULFATE 50 MG/ML
INJECTION, SOLUTION INTRAVENOUS AS NEEDED
Status: DISCONTINUED | OUTPATIENT
Start: 2021-01-28 | End: 2021-01-28 | Stop reason: SURG

## 2021-01-28 RX ORDER — DIPHENHYDRAMINE HYDROCHLORIDE 50 MG/ML
25 INJECTION INTRAMUSCULAR; INTRAVENOUS EVERY 4 HOURS PRN
Status: DISCONTINUED | OUTPATIENT
Start: 2021-01-28 | End: 2021-01-29 | Stop reason: HOSPADM

## 2021-01-28 RX ORDER — CEFAZOLIN SODIUM 2 G/100ML
2 INJECTION, SOLUTION INTRAVENOUS EVERY 8 HOURS
Status: COMPLETED | OUTPATIENT
Start: 2021-01-28 | End: 2021-01-29

## 2021-01-28 RX ORDER — CETIRIZINE HYDROCHLORIDE, PSEUDOEPHEDRINE HYDROCHLORIDE 5; 120 MG/1; MG/1
1 TABLET, FILM COATED, EXTENDED RELEASE ORAL DAILY
Status: DISCONTINUED | OUTPATIENT
Start: 2021-01-29 | End: 2021-01-29 | Stop reason: HOSPADM

## 2021-01-28 RX ORDER — ACETAMINOPHEN 500 MG
1000 TABLET ORAL EVERY 8 HOURS SCHEDULED
Status: DISCONTINUED | OUTPATIENT
Start: 2021-01-28 | End: 2021-01-29 | Stop reason: HOSPADM

## 2021-01-28 RX ORDER — PANTOPRAZOLE SODIUM 40 MG/10ML
40 INJECTION, POWDER, LYOPHILIZED, FOR SOLUTION INTRAVENOUS ONCE
Status: COMPLETED | OUTPATIENT
Start: 2021-01-28 | End: 2021-01-28

## 2021-01-28 RX ORDER — NALOXONE HCL 0.4 MG/ML
0.4 VIAL (ML) INJECTION
Status: DISCONTINUED | OUTPATIENT
Start: 2021-01-28 | End: 2021-01-29 | Stop reason: HOSPADM

## 2021-01-28 RX ORDER — PROMETHAZINE HYDROCHLORIDE 12.5 MG/1
12.5 TABLET ORAL EVERY 6 HOURS PRN
Status: DISCONTINUED | OUTPATIENT
Start: 2021-01-28 | End: 2021-01-29 | Stop reason: HOSPADM

## 2021-01-28 RX ORDER — PROMETHAZINE HYDROCHLORIDE 25 MG/1
25 SUPPOSITORY RECTAL ONCE AS NEEDED
Status: DISCONTINUED | OUTPATIENT
Start: 2021-01-28 | End: 2021-01-28 | Stop reason: HOSPADM

## 2021-01-28 RX ORDER — GABAPENTIN 100 MG/1
100 CAPSULE ORAL 3 TIMES DAILY
Status: DISCONTINUED | OUTPATIENT
Start: 2021-01-28 | End: 2021-01-29 | Stop reason: HOSPADM

## 2021-01-28 RX ADMIN — ONDANSETRON 4 MG: 2 INJECTION INTRAMUSCULAR; INTRAVENOUS at 11:07

## 2021-01-28 RX ADMIN — ROCURONIUM BROMIDE 10 MG: 10 INJECTION INTRAVENOUS at 10:39

## 2021-01-28 RX ADMIN — ACETAMINOPHEN 1000 MG: 500 TABLET ORAL at 16:18

## 2021-01-28 RX ADMIN — FENTANYL CITRATE 50 MCG: 50 INJECTION, SOLUTION INTRAMUSCULAR; INTRAVENOUS at 10:52

## 2021-01-28 RX ADMIN — CEFAZOLIN SODIUM 2 G: 2 INJECTION, SOLUTION INTRAVENOUS at 09:59

## 2021-01-28 RX ADMIN — GABAPENTIN 100 MG: 100 CAPSULE ORAL at 16:18

## 2021-01-28 RX ADMIN — EPHEDRINE SULFATE 10 MG: 50 INJECTION, SOLUTION INTRAVENOUS at 10:39

## 2021-01-28 RX ADMIN — PANTOPRAZOLE SODIUM 40 MG: 40 INJECTION, POWDER, FOR SOLUTION INTRAVENOUS at 08:54

## 2021-01-28 RX ADMIN — GABAPENTIN 600 MG: 300 CAPSULE ORAL at 08:55

## 2021-01-28 RX ADMIN — SODIUM CHLORIDE, POTASSIUM CHLORIDE, SODIUM LACTATE AND CALCIUM CHLORIDE 150 ML/HR: 600; 310; 30; 20 INJECTION, SOLUTION INTRAVENOUS at 09:34

## 2021-01-28 RX ADMIN — SODIUM CHLORIDE, POTASSIUM CHLORIDE, SODIUM LACTATE AND CALCIUM CHLORIDE 1000 ML: 600; 310; 30; 20 INJECTION, SOLUTION INTRAVENOUS at 08:30

## 2021-01-28 RX ADMIN — BUPRENORPHINE HYDROCHLORIDE 0.3 MG: 0.32 INJECTION INTRAMUSCULAR; INTRAVENOUS at 10:09

## 2021-01-28 RX ADMIN — CHLORHEXIDINE GLUCONATE 0.12% ORAL RINSE 30 ML: 1.2 LIQUID ORAL at 08:58

## 2021-01-28 RX ADMIN — LIDOCAINE HYDROCHLORIDE 50 MG: 10 INJECTION, SOLUTION INFILTRATION; PERINEURAL at 10:03

## 2021-01-28 RX ADMIN — ACETAMINOPHEN 1000 MG: 500 TABLET ORAL at 08:54

## 2021-01-28 RX ADMIN — DEXAMETHASONE SODIUM PHOSPHATE 6 MG: 10 INJECTION, SOLUTION INTRAMUSCULAR; INTRAVENOUS at 10:12

## 2021-01-28 RX ADMIN — PROPOFOL 25 MCG/KG/MIN: 10 INJECTION, EMULSION INTRAVENOUS at 10:09

## 2021-01-28 RX ADMIN — NEOSTIGMINE 5 MG: 1 INJECTION INTRAVENOUS at 11:13

## 2021-01-28 RX ADMIN — ONDANSETRON 4 MG: 2 INJECTION INTRAMUSCULAR; INTRAVENOUS at 16:18

## 2021-01-28 RX ADMIN — MIDAZOLAM 2 MG: 1 INJECTION INTRAMUSCULAR; INTRAVENOUS at 09:33

## 2021-01-28 RX ADMIN — ONDANSETRON 4 MG: 2 INJECTION INTRAMUSCULAR; INTRAVENOUS at 21:52

## 2021-01-28 RX ADMIN — SODIUM CHLORIDE, POTASSIUM CHLORIDE, SODIUM LACTATE AND CALCIUM CHLORIDE 150 ML/HR: 600; 310; 30; 20 INJECTION, SOLUTION INTRAVENOUS at 13:46

## 2021-01-28 RX ADMIN — CEFAZOLIN 2 G: 10 INJECTION, POWDER, FOR SOLUTION INTRAVENOUS at 17:59

## 2021-01-28 RX ADMIN — DEXAMETHASONE SODIUM PHOSPHATE 4 MG: 10 INJECTION, SOLUTION INTRAMUSCULAR; INTRAVENOUS at 10:09

## 2021-01-28 RX ADMIN — PROPOFOL 250 MG: 10 INJECTION, EMULSION INTRAVENOUS at 10:03

## 2021-01-28 RX ADMIN — ROCURONIUM BROMIDE 10 MG: 10 INJECTION INTRAVENOUS at 10:52

## 2021-01-28 RX ADMIN — SCOPALAMINE 1 PATCH: 1 PATCH, EXTENDED RELEASE TRANSDERMAL at 08:54

## 2021-01-28 RX ADMIN — FENTANYL CITRATE 50 MCG: 50 INJECTION, SOLUTION INTRAMUSCULAR; INTRAVENOUS at 10:03

## 2021-01-28 RX ADMIN — LIDOCAINE HYDROCHLORIDE 3 ML: 20 JELLY TOPICAL at 10:07

## 2021-01-28 RX ADMIN — GABAPENTIN 100 MG: 100 CAPSULE ORAL at 20:55

## 2021-01-28 RX ADMIN — GLYCOPYRROLATE 0.2 MG: 0.4 INJECTION INTRAMUSCULAR; INTRAVENOUS at 10:20

## 2021-01-28 RX ADMIN — BUPIVACAINE HYDROCHLORIDE 60 ML: 2.5 INJECTION, SOLUTION EPIDURAL; INFILTRATION; INTRACAUDAL; PERINEURAL at 10:09

## 2021-01-28 RX ADMIN — GLYCOPYRROLATE 0.6 MG: 0.4 INJECTION INTRAMUSCULAR; INTRAVENOUS at 11:13

## 2021-01-28 RX ADMIN — ROCURONIUM BROMIDE 40 MG: 10 INJECTION INTRAVENOUS at 10:03

## 2021-01-28 NOTE — ANESTHESIA PREPROCEDURE EVALUATION
Anesthesia Evaluation     Patient summary reviewed and Nursing notes reviewed                Airway   Mallampati: III  TM distance: >3 FB  Neck ROM: full  Possible difficult intubation  Dental - normal exam     Pulmonary - normal exam   (+) sleep apnea on CPAP,   Cardiovascular - normal exam    (+) hypertension, hyperlipidemia,       Neuro/Psych- negative ROS  GI/Hepatic/Renal/Endo    (+) morbid obesity, GERD,      Musculoskeletal (-) negative ROS    Abdominal  - normal exam    Bowel sounds: normal.   Substance History - negative use     OB/GYN negative ob/gyn ROS         Other                        Anesthesia Plan    ASA 3     general with block   (Glidescope/zayas available)  intravenous induction     Anesthetic plan, all risks, benefits, and alternatives have been provided, discussed and informed consent has been obtained with: patient.    Plan discussed with CRNA.

## 2021-01-28 NOTE — ANESTHESIA PROCEDURE NOTES
Peripheral Block      Patient reassessed immediately prior to procedure    Patient location during procedure: OR  Reason for block: at surgeon's request and post-op pain management  Performed by  Anesthesiologist: Rayshawn Diana MD  Preanesthetic Checklist  Completed: patient identified, site marked, surgical consent, pre-op evaluation, timeout performed, IV checked, risks and benefits discussed and monitors and equipment checked  Prep:  Pt Position: supine  Sterile barriers:cap, gloves, sterile barriers and mask  Prep: ChloraPrep  Patient monitoring: blood pressure monitoring, continuous pulse oximetry and EKG  Procedure  Sedation:yes  Performed under: general  Guidance:ultrasound guided  Images:still images not obtained    Laterality:Bilateral  Block Type:TAP  Injection Technique:single-shot  Needle Type:short-bevel and echogenic  Needle Gauge:20 G  Resistance on Injection: none    Medications Used: buprenorphine (BUPRENEX) injection, 0.3 mg  dexamethasone sodium phosphate injection, 4 mg  bupivacaine PF (MARCAINE) 0.25 % injection, 60 mL  Med admintered at 1/28/2021 10:09 AM      Medications  Comment:Block Injection:  LA dose divided between Right and Left block        Post Assessment  Injection Assessment: negative aspiration for heme, incremental injection and no paresthesia on injection  Patient Tolerance:comfortable throughout block  Complications:no  Additional Notes      Under Ultrasound guidance, a BBraun 4inch 360 degree needle was advanced with Normal Saline hydro dissection of tissue.  The Internal Oblique and Transversus Abdominus muscles where visualized.  At or before the aponeurosis of Internal Oblique, local anesthetic spread was visualized in the Transversus Abdominus Plane. Injection was made incrementally with aspiration every 5 mls.  There was no  intravascular injection,  injection pressure was normal, there was no neural injection, and the procedure was completed without difficulty.  Thank  You.

## 2021-01-28 NOTE — ANESTHESIA POSTPROCEDURE EVALUATION
Patient: Aniya Castro    Procedure Summary     Date: 01/28/21 [facility] / [facility]    Anesthesia Start: 0959 Anesthesia Stop: 1130    Procedures:       GASTRIC SLEEVE LAPAROSCOPIC (N/A Abdomen)      HIATAL HERNIA REPAIR LAPAROSCOPIC (N/A Abdomen)      ESOPHAGOGASTRODUODENOSCOPY (N/A Esophagus) Diagnosis:       Morbid obesity with body mass index (BMI) of 40.0 to 44.9 in adult (CMS/[facility])      Hiatal hernia with gastroesophageal reflux      (Morbid obesity with body mass index (BMI) of 40.0 to 44.9 in adult (CMS/[facility]) [E66.01, Z68.41])      (Hiatal hernia with gastroesophageal reflux [K21.9, K44.9])    Surgeon: Bandar Rao MD Provider: Rayshawn Diana MD    Anesthesia Type: general with block ASA Status: 3          Anesthesia Type: general with block    Vitals  Vitals Value Taken Time   BP     Temp     Pulse 83 01/28/21 1128   Resp     SpO2 82 % 01/28/21 1128   Vitals shown include unvalidated device data.        Post Anesthesia Care and Evaluation    Patient location during evaluation: PACU  Patient participation: complete - patient participated  Level of consciousness: awake and alert  Pain management: adequate  Airway patency: patent  Anesthetic complications: No anesthetic complications  PONV Status: none  Cardiovascular status: hemodynamically stable and acceptable  Respiratory status: nonlabored ventilation, acceptable and nasal cannula  Hydration status: acceptable

## 2021-01-28 NOTE — ANESTHESIA PROCEDURE NOTES
Airway  Urgency: elective    Date/Time: 1/28/2021 10:07 AM  Airway not difficult    General Information and Staff    Patient location during procedure: OR  CRNA: Radha Christy CRNA    Indications and Patient Condition  Indications for airway management: airway protection    Preoxygenated: yes  MILS not maintained throughout  Mask difficulty assessment: 1 - vent by mask    Final Airway Details  Final airway type: endotracheal airway      Successful airway: ETT  Cuffed: yes   Successful intubation technique: direct laryngoscopy  Facilitating devices/methods: intubating stylet  Endotracheal tube insertion site: oral  Blade: Terrie  Blade size: 3  ETT size (mm): 7.0  Cormack-Lehane Classification: grade I - full view of glottis  Placement verified by: chest auscultation and capnometry   Measured from: lips  ETT/EBT  to lips (cm): 20  Number of attempts at approach: 1  Assessment: lips, teeth, and gum same as pre-op and atraumatic intubation    Additional Comments  Symmetric chest rise and fall. +ETCO2 +BBS.

## 2021-01-29 ENCOUNTER — READMISSION MANAGEMENT (OUTPATIENT)
Dept: CALL CENTER | Facility: HOSPITAL | Age: 51
End: 2021-01-29

## 2021-01-29 ENCOUNTER — APPOINTMENT (OUTPATIENT)
Dept: GENERAL RADIOLOGY | Facility: HOSPITAL | Age: 51
End: 2021-01-29

## 2021-01-29 VITALS
HEART RATE: 78 BPM | RESPIRATION RATE: 18 BRPM | TEMPERATURE: 98.3 F | OXYGEN SATURATION: 99 % | WEIGHT: 226.5 LBS | BODY MASS INDEX: 40.13 KG/M2 | HEIGHT: 63 IN | DIASTOLIC BLOOD PRESSURE: 71 MMHG | SYSTOLIC BLOOD PRESSURE: 124 MMHG

## 2021-01-29 LAB
ALBUMIN SERPL-MCNC: 3.7 G/DL (ref 3.5–5.2)
ALBUMIN/GLOB SERPL: 1.3 G/DL
ALP SERPL-CCNC: 61 U/L (ref 39–117)
ALT SERPL W P-5'-P-CCNC: 25 U/L (ref 1–33)
ANION GAP SERPL CALCULATED.3IONS-SCNC: 10 MMOL/L (ref 5–15)
AST SERPL-CCNC: 21 U/L (ref 1–32)
BASOPHILS # BLD AUTO: 0.03 10*3/MM3 (ref 0–0.2)
BASOPHILS NFR BLD AUTO: 0.3 % (ref 0–1.5)
BILIRUB SERPL-MCNC: 0.3 MG/DL (ref 0–1.2)
BUN SERPL-MCNC: 9 MG/DL (ref 6–20)
BUN/CREAT SERPL: 13.2 (ref 7–25)
CALCIUM SPEC-SCNC: 8.5 MG/DL (ref 8.6–10.5)
CHLORIDE SERPL-SCNC: 104 MMOL/L (ref 98–107)
CO2 SERPL-SCNC: 27 MMOL/L (ref 22–29)
CREAT SERPL-MCNC: 0.68 MG/DL (ref 0.57–1)
CYTO UR: NORMAL
DEPRECATED RDW RBC AUTO: 43.9 FL (ref 37–54)
EOSINOPHIL # BLD AUTO: 0.02 10*3/MM3 (ref 0–0.4)
EOSINOPHIL NFR BLD AUTO: 0.2 % (ref 0.3–6.2)
ERYTHROCYTE [DISTWIDTH] IN BLOOD BY AUTOMATED COUNT: 13.5 % (ref 12.3–15.4)
GFR SERPL CREATININE-BSD FRML MDRD: 92 ML/MIN/1.73
GLOBULIN UR ELPH-MCNC: 2.9 GM/DL
GLUCOSE SERPL-MCNC: 103 MG/DL (ref 65–99)
HCT VFR BLD AUTO: 38.1 % (ref 34–46.6)
HGB BLD-MCNC: 11.9 G/DL (ref 12–15.9)
IMM GRANULOCYTES # BLD AUTO: 0.02 10*3/MM3 (ref 0–0.05)
IMM GRANULOCYTES NFR BLD AUTO: 0.2 % (ref 0–0.5)
IRON 24H UR-MRATE: 77 MCG/DL (ref 37–145)
LAB AP CASE REPORT: NORMAL
LAB AP CLINICAL INFORMATION: NORMAL
LYMPHOCYTES # BLD AUTO: 1.94 10*3/MM3 (ref 0.7–3.1)
LYMPHOCYTES NFR BLD AUTO: 18.8 % (ref 19.6–45.3)
MCH RBC QN AUTO: 27.9 PG (ref 26.6–33)
MCHC RBC AUTO-ENTMCNC: 31.2 G/DL (ref 31.5–35.7)
MCV RBC AUTO: 89.4 FL (ref 79–97)
MONOCYTES # BLD AUTO: 1.05 10*3/MM3 (ref 0.1–0.9)
MONOCYTES NFR BLD AUTO: 10.2 % (ref 5–12)
NEUTROPHILS NFR BLD AUTO: 7.24 10*3/MM3 (ref 1.7–7)
NEUTROPHILS NFR BLD AUTO: 70.3 % (ref 42.7–76)
NRBC BLD AUTO-RTO: 0 /100 WBC (ref 0–0.2)
PATH REPORT.FINAL DX SPEC: NORMAL
PATH REPORT.GROSS SPEC: NORMAL
PLATELET # BLD AUTO: 346 10*3/MM3 (ref 140–450)
PMV BLD AUTO: 10.9 FL (ref 6–12)
POTASSIUM SERPL-SCNC: 3.9 MMOL/L (ref 3.5–5.2)
PROT SERPL-MCNC: 6.6 G/DL (ref 6–8.5)
RBC # BLD AUTO: 4.26 10*6/MM3 (ref 3.77–5.28)
SODIUM SERPL-SCNC: 141 MMOL/L (ref 136–145)
WBC # BLD AUTO: 10.3 10*3/MM3 (ref 3.4–10.8)

## 2021-01-29 PROCEDURE — 99024 POSTOP FOLLOW-UP VISIT: CPT | Performed by: SURGERY

## 2021-01-29 PROCEDURE — 83540 ASSAY OF IRON: CPT | Performed by: SURGERY

## 2021-01-29 PROCEDURE — 74240 X-RAY XM UPR GI TRC 1CNTRST: CPT

## 2021-01-29 PROCEDURE — 85025 COMPLETE CBC W/AUTO DIFF WBC: CPT | Performed by: SURGERY

## 2021-01-29 PROCEDURE — 25010000002 CYANOCOBALAMIN PER 1000 MCG: Performed by: SURGERY

## 2021-01-29 PROCEDURE — 25010000002 THIAMINE PER 100 MG: Performed by: SURGERY

## 2021-01-29 PROCEDURE — 0 DIATRIZOATE MEGLUMINE & SODIUM PER 1 ML: Performed by: SURGERY

## 2021-01-29 PROCEDURE — 80053 COMPREHEN METABOLIC PANEL: CPT | Performed by: SURGERY

## 2021-01-29 PROCEDURE — 25010000002 ONDANSETRON PER 1 MG: Performed by: SURGERY

## 2021-01-29 PROCEDURE — 25010000002 ENOXAPARIN PER 10 MG: Performed by: SURGERY

## 2021-01-29 PROCEDURE — 94660 CPAP INITIATION&MGMT: CPT

## 2021-01-29 PROCEDURE — 25010000002 CEFAZOLIN PER 500 MG: Performed by: SURGERY

## 2021-01-29 PROCEDURE — 94799 UNLISTED PULMONARY SVC/PX: CPT

## 2021-01-29 RX ORDER — OXYCODONE HYDROCHLORIDE 5 MG/1
5 TABLET ORAL EVERY 6 HOURS PRN
Qty: 10 TABLET | Refills: 0 | Status: SHIPPED | OUTPATIENT
Start: 2021-01-29 | End: 2021-02-24

## 2021-01-29 RX ORDER — ONDANSETRON 4 MG/1
4 TABLET, ORALLY DISINTEGRATING ORAL EVERY 8 HOURS PRN
Qty: 10 TABLET | Refills: 0 | Status: SHIPPED | OUTPATIENT
Start: 2021-01-29

## 2021-01-29 RX ADMIN — FLUOXETINE HYDROCHLORIDE 40 MG: 20 CAPSULE ORAL at 08:13

## 2021-01-29 RX ADMIN — GABAPENTIN 100 MG: 100 CAPSULE ORAL at 15:37

## 2021-01-29 RX ADMIN — ACETAMINOPHEN 1000 MG: 500 TABLET ORAL at 05:50

## 2021-01-29 RX ADMIN — ONDANSETRON 4 MG: 2 INJECTION INTRAMUSCULAR; INTRAVENOUS at 05:52

## 2021-01-29 RX ADMIN — ACETAMINOPHEN 1000 MG: 500 TABLET ORAL at 15:37

## 2021-01-29 RX ADMIN — CYANOCOBALAMIN 1000 MCG: 1000 INJECTION, SOLUTION INTRAMUSCULAR; SUBCUTANEOUS at 08:14

## 2021-01-29 RX ADMIN — ONDANSETRON 4 MG: 2 INJECTION INTRAMUSCULAR; INTRAVENOUS at 11:44

## 2021-01-29 RX ADMIN — CEFAZOLIN 2 G: 10 INJECTION, POWDER, FOR SOLUTION INTRAVENOUS at 02:04

## 2021-01-29 RX ADMIN — ENOXAPARIN SODIUM 40 MG: 40 INJECTION SUBCUTANEOUS at 08:14

## 2021-01-29 RX ADMIN — Medication 30 ML: at 09:22

## 2021-01-29 RX ADMIN — PANTOPRAZOLE SODIUM 40 MG: 40 INJECTION, POWDER, FOR SOLUTION INTRAVENOUS at 05:52

## 2021-01-29 RX ADMIN — ALPRAZOLAM 0.25 MG: 0.25 TABLET ORAL at 08:42

## 2021-01-29 RX ADMIN — GABAPENTIN 100 MG: 100 CAPSULE ORAL at 08:14

## 2021-01-29 RX ADMIN — FOLIC ACID 250 ML/HR: 5 INJECTION, SOLUTION INTRAMUSCULAR; INTRAVENOUS; SUBCUTANEOUS at 10:49

## 2021-01-29 NOTE — OUTREACH NOTE
Prep Survey      Responses   [facility] patient discharged from?  [place]   Is LACE score < 7 ?  Yes   Emergency Room discharge w/ pulse ox?  No   [facility]   Date of Admission  01/28/21   Date of Discharge  01/29/21   Discharge Disposition  Home or Self Care   Discharge diagnosis  GASTRIC SLEEVE LAPAROSCOPIc,  hiatal hernia repair   Does the patient have one of the following disease processes/diagnoses(primary or secondary)?  General Surgery   Does the patient have Home health ordered?  No   Is there a DME ordered?  No   Prep survey completed?  Yes          Pushpa Lomeli RN

## 2021-02-01 ENCOUNTER — TRANSITIONAL CARE MANAGEMENT TELEPHONE ENCOUNTER (OUTPATIENT)
Dept: CALL CENTER | Facility: HOSPITAL | Age: 51
End: 2021-02-01

## 2021-02-01 ENCOUNTER — IMMUNIZATION (OUTPATIENT)
Dept: VACCINE CLINIC | Facility: HOSPITAL | Age: 51
End: 2021-02-01

## 2021-02-01 PROCEDURE — 0002A: CPT | Performed by: INTERNAL MEDICINE

## 2021-02-01 PROCEDURE — 91300 HC SARSCOV02 VAC 30MCG/0.3ML IM: CPT | Performed by: INTERNAL MEDICINE

## 2021-02-01 NOTE — OUTREACH NOTE
Call Center TCM Note      Responses   [facility] patient discharged from?  [place]   Does the patient have one of the following disease processes/diagnoses(primary or secondary)?  General Surgery   TCM attempt successful?  No   Unsuccessful attempts  Attempt 2          Fior Pennington MA    2/1/2021, 16:16 EST

## 2021-02-01 NOTE — OUTREACH NOTE
Call Center TCM Note      Responses   [facility] patient discharged from?  [place]   Does the patient have one of the following disease processes/diagnoses(primary or secondary)?  General Surgery   TCM attempt successful?  No   Unsuccessful attempts  Attempt 1   Call Status  Left message          Fior Pennington MA    2/1/2021, 13:50 EST

## 2021-02-02 ENCOUNTER — TRANSITIONAL CARE MANAGEMENT TELEPHONE ENCOUNTER (OUTPATIENT)
Dept: CALL CENTER | Facility: HOSPITAL | Age: 51
End: 2021-02-02

## 2021-02-02 NOTE — OUTREACH NOTE
Call Center TCM Note      Responses   [facility] patient discharged from?  [place]   Does the patient have one of the following disease processes/diagnoses(primary or secondary)?  General Surgery   TCM attempt successful?  Yes   Call start time  1430   Call end time  1431   Discharge diagnosis  GASTRIC SLEEVE LAPAROSCOPIc,  hiatal hernia repair   Does the patient have all medications related to this admission filled (includes all antibiotics, pain medications, etc.)  Yes   Is the patient taking all medications as directed (includes completed medication regime)?  Yes   Does the patient have a follow up appointment scheduled with their surgeon?  Yes   Has the patient kept scheduled appointments due by today?  N/A   Comments  f/u with surgeon and PCP on 2/4/21   Psychosocial issues?  No   Did the patient receive a copy of their discharge instructions?  Yes   Nursing interventions  Reviewed instructions with patient   What is the patient's perception of their health status since discharge?  Improving   Nursing interventions  Nurse provided patient education   Is the patient /caregiver able to teach back basic post-op care?  Continue use of incentive spirometry at least 1 week post discharge, Practice 'cough and deep breath', Drive as instructed by MD in discharge instructions, Take showers only when approved by MD-sponge bathe until then, No tub bath, swimming, or hot tub until instructed by MD, Keep incision areas clean,dry and protected, Do not remove steri-strips, Lifting as instructed by MD in discharge instructions   Is the patient/caregiver able to teach back signs and symptoms of incisional infection?  Fever   Is the patient/caregiver able to teach back the hierarchy of who to call/visit for symptoms/problems? PCP, Specialist, Home health nurse, Urgent Care, ED, 911  Yes   TCM call completed?  Yes   Wrap up additional comments  States she is doing well, no questions or concerns at this time.           Shannon Arguelles RN    2/2/2021, 14:31 EST

## 2021-02-04 ENCOUNTER — OFFICE VISIT (OUTPATIENT)
Dept: FAMILY MEDICINE CLINIC | Facility: CLINIC | Age: 51
End: 2021-02-04

## 2021-02-04 ENCOUNTER — OFFICE VISIT (OUTPATIENT)
Dept: BARIATRICS/WEIGHT MGMT | Facility: CLINIC | Age: 51
End: 2021-02-04

## 2021-02-04 VITALS
SYSTOLIC BLOOD PRESSURE: 122 MMHG | BODY MASS INDEX: 36.94 KG/M2 | WEIGHT: 208.5 LBS | DIASTOLIC BLOOD PRESSURE: 64 MMHG | HEIGHT: 63 IN | TEMPERATURE: 97.7 F | RESPIRATION RATE: 18 BRPM | OXYGEN SATURATION: 98 % | HEART RATE: 97 BPM

## 2021-02-04 VITALS
HEART RATE: 76 BPM | WEIGHT: 211.6 LBS | OXYGEN SATURATION: 99 % | HEIGHT: 63 IN | SYSTOLIC BLOOD PRESSURE: 126 MMHG | RESPIRATION RATE: 18 BRPM | DIASTOLIC BLOOD PRESSURE: 82 MMHG | BODY MASS INDEX: 37.49 KG/M2

## 2021-02-04 DIAGNOSIS — E66.01 MORBID OBESITY (HCC): Primary | ICD-10-CM

## 2021-02-04 DIAGNOSIS — I10 ESSENTIAL HYPERTENSION: ICD-10-CM

## 2021-02-04 DIAGNOSIS — E66.01 MORBID OBESITY WITH BODY MASS INDEX (BMI) OF 40.0 TO 44.9 IN ADULT (HCC): Primary | ICD-10-CM

## 2021-02-04 DIAGNOSIS — R12 HEARTBURN: ICD-10-CM

## 2021-02-04 DIAGNOSIS — K21.9 HIATAL HERNIA WITH GASTROESOPHAGEAL REFLUX: ICD-10-CM

## 2021-02-04 DIAGNOSIS — Z98.890 HISTORY OF REPAIR OF HIATAL HERNIA: ICD-10-CM

## 2021-02-04 DIAGNOSIS — Z98.84 STATUS POST BARIATRIC SURGERY: ICD-10-CM

## 2021-02-04 DIAGNOSIS — R73.03 PREDIABETES: ICD-10-CM

## 2021-02-04 DIAGNOSIS — Z87.19 HISTORY OF REPAIR OF HIATAL HERNIA: ICD-10-CM

## 2021-02-04 DIAGNOSIS — R53.83 FATIGUE, UNSPECIFIED TYPE: ICD-10-CM

## 2021-02-04 DIAGNOSIS — F41.1 ANXIETY, GENERALIZED: ICD-10-CM

## 2021-02-04 DIAGNOSIS — G47.30 SLEEP APNEA WITH USE OF CONTINUOUS POSITIVE AIRWAY PRESSURE (CPAP): ICD-10-CM

## 2021-02-04 DIAGNOSIS — K44.9 HIATAL HERNIA WITH GASTROESOPHAGEAL REFLUX: ICD-10-CM

## 2021-02-04 PROCEDURE — 99213 OFFICE O/P EST LOW 20 MIN: CPT | Performed by: FAMILY MEDICINE

## 2021-02-04 PROCEDURE — 99024 POSTOP FOLLOW-UP VISIT: CPT | Performed by: SURGERY

## 2021-02-04 RX ORDER — TRIAMTERENE AND HYDROCHLOROTHIAZIDE 37.5; 25 MG/1; MG/1
1 TABLET ORAL DAILY
Qty: 30 TABLET | Refills: 5
Start: 2021-02-04 | End: 2021-02-22 | Stop reason: SDUPTHER

## 2021-02-04 RX ORDER — FLUOXETINE HYDROCHLORIDE 40 MG/1
40 CAPSULE ORAL DAILY
Qty: 30 CAPSULE | Refills: 11 | Status: SHIPPED | OUTPATIENT
Start: 2021-02-04 | End: 2022-03-11 | Stop reason: SDUPTHER

## 2021-02-04 NOTE — PROGRESS NOTES
[facility] Bariatric Surgery  [address]  [address]  [facility] [id number] [phone]      Patient Name:  Aniya Castro.  :  1970      Date of Visit: 2021      Reason for Visit:  POD #7    HPI:  Aniya Castro is a 50 y.o. female s/p 21 LSG/HHR with Dr. Rao.      Has started menstruated with IUD, which is unusual for her.    Doing well.  No issues/concerns. Denies dysphagia, reflux, nausea, vomiting, abdominal pain, pulmonary issues and fevers.  Tolerating diet progression - on stage 1.  Getting  g prot/day.  Drinking 64 fluid oz/day.  Tried the oral vitamins yesterday but tolerated poorly, so is going to order patches..  Holding ASA , NSAIDs  and Steroids.  Taking Protonix and plans to stay on indefinitely due to hx of peptic ulcer disease.  Ambulating.     Presurgery weight: 226 pounds.  Today's weight is 208 pounds, today's  Body mass index is 38.09 kg/m²., and her weight loss since surgery is 18 pounds.       Path reviewed with patient:  Final Diagnosis   STOMACH, SUBTOTAL GASTRECTOMY:  Gastric fundic polyp and mixed gastric fundic-hyperplastic polyp.         Past Medical History:   Diagnosis Date   • Anxiety, generalized    • Duodenal ulcer     dx during ERCP 3/2019, no bx, HPSA (-), continues on daily Protonix since   • Hiatal hernia with gastroesophageal reflux     controlled w/ daily Protonix, EGD 20 w/ GDW revealed small asymmetric HH   • HTN (hypertension)    • Hyperlipidemia     borderline, no meds   • Morbid obesity (CMS/HCC)    • Organic mood disorder of depressed type    • Prediabetes     A1C 6.1   • Sleep apnea with use of continuous positive airway pressure (CPAP)     compliant with machine    • Wears glasses      Past Surgical History:   Procedure Laterality Date   • ADENOIDECTOMY     •  SECTION     • CHOLECYSTECTOMY WITH INTRAOPERATIVE CHOLANGIOGRAM N/A 3/5/2019    Procedure: CHOLECYSTECTOMY LAPAROSCOPIC WITH  [facility];  Surgeon: Wali Nicole MD;  Location:  [place] [facility];  Service: General   • EAR TUBES  1979   • ENDOSCOPY N/A 1/28/2021    Procedure: ESOPHAGOGASTRODUODENOSCOPY;  Surgeon: Bandar Rao MD;  Location:  [place] [facility];  Service: Bariatric;  Laterality: N/A;   • ERCP N/A 3/6/2019    Procedure: ENDOSCOPIC RETROGRADE CHOLANGIOPANCREATOGRAPHY;  Surgeon: Todd Campos MD;  Location:  [facility];  Service: Gastroenterology   • GASTRIC SLEEVE LAPAROSCOPIC N/A 1/28/2021    Procedure: GASTRIC SLEEVE LAPAROSCOPIC;  Surgeon: Bandar Rao MD;  Location:  [place] [facility];  Service: Bariatric;  Laterality: N/A;   • HIATAL HERNIA REPAIR N/A 1/28/2021    Procedure: HIATAL HERNIA REPAIR LAPAROSCOPIC;  Surgeon: Bandar Rao MD;  Location:  [place] [facility];  Service: Bariatric;  Laterality: N/A;   • INTRAUTERINE DEVICE INSERTION  2015    due for removal   • WISDOM TOOTH EXTRACTION  1987     Outpatient Medications Marked as Taking for the 2/4/21 encounter (Office Visit) with Violette Hagan MD   Medication Sig Dispense Refill   • Biotin 2.5 MG tablet Take 2.5 mg by mouth Daily.     • docusate sodium 100 MG capsule Take 100 mg by mouth 2 (Two) Times a Day. 20 each 0   • FLUoxetine (PROzac) 40 MG capsule Take 1 capsule by mouth Daily. 30 capsule 11   • loratadine-pseudoephedrine (CLARITIN-D 24-hour)  MG per 24 hr tablet Take 1 tablet by mouth Daily. 15 tablet 0   • Multiple Vitamin (MULTI VITAMIN DAILY PO) Take 1 capsule by mouth Daily.     • pantoprazole (PROTONIX) 40 MG EC tablet Take 1 tablet by mouth Daily. 90 tablet 3   • triamterene-hydrochlorothiazide (MAXZIDE-25) 37.5-25 MG per tablet Take 1 tablet by mouth Daily. 30 tablet 5     Allergies   Allergen Reactions   • Sulfa Antibiotics Hives       Social History     Socioeconomic History   • Marital status:      Spouse name: Not on file   • Number of children: Not on file   • Years of education: Not on file   • Highest education level: Not on  "file   Tobacco Use   • Smoking status: Never Smoker   • Smokeless tobacco: Never Used   Substance and Sexual Activity   • Alcohol use: Yes     Comment: Occassional   • Drug use: No   • Sexual activity: Defer   Social History Narrative    Lives in [place], [place] but moving to [facility] w/ her boyfriend.  She has 3 children aged: 29, 27, and 26.  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         /64 (BP Location: Left arm, Patient Position: Sitting, Cuff Size: Adult)   Pulse 97   Temp 97.7 °F (36.5 °C) (Temporal)   Resp 18   Ht 158.8 cm (62.5\")   SpO2 98%   BMI 38.09 kg/m²   Physical Exam  Constitutional:       General: She is not in acute distress.     Appearance: She is well-developed. She is not diaphoretic.   HENT:      Head: Normocephalic and atraumatic.      Mouth/Throat:      Pharynx: No oropharyngeal exudate.   Eyes:      Conjunctiva/sclera: Conjunctivae normal.      Pupils: Pupils are equal, round, and reactive to light.   Pulmonary:      Effort: Pulmonary effort is normal. No respiratory distress.   Abdominal:      General: There is no distension.      Palpations: Abdomen is soft.      Comments: Incisions are well-healing without induration, erythema, fluctuance, or drainage.       Skin:     General: Skin is warm and dry.      Coloration: Skin is not pale.   Neurological:      Mental Status: She is alert and oriented to person, place, and time.      Cranial Nerves: No cranial nerve deficit.   Psychiatric:         Behavior: Behavior normal.         Thought Content: Thought content normal.           Assessment:   POD # 7      Plan:  Doing well. Continue to advance diet per manual.  Increase protein intake to 100g/day.  Increase exercise/activity as tolerated.  Reviewed lifting restrictions, nothing >25 lbs x 2 more weeks.  Continue vitamins.  Continue PPI.  Continue to avoid ASA/NSAIDs/Steroids x 6 weeks postop.  Call w/ problems/concerns.    I think her " menstruation is not unexpected with the fat loss and attendant change in hormones.  Advised d/w gynecologist if needed.    The patient was instructed to follow up in 3 weeks, sooner if needed.     Violette Hagan MD

## 2021-02-04 NOTE — PROGRESS NOTES
Subjective   Aniya Castro is a 50 y.o. female    Chief Complaint    Hospital follow-up gastric sleeve  Anxiety and depression  Edema and hypertension    History of Present Illness  Patient presents today for follow-up visit after recent bariatric surgery procedure where she had hiatal hernia repair and a gastric sleeve procedure.  There were no perioperative complications.  She complains of some mild abdominal soreness.  According to her medical record she has already lost 15 pounds.  She is on protein shakes and otherwise liquid diet.  She reports that she feels full quickly.  She has advised to pay attention to that feeling in the future.  She is asking that Maxide be readded to her list of medications.  She has not restarted this as of yet.  She is also asking to switch back to a 40 mg Prozac so she does not have to take 2 of the 20 mg capsules.  She has a follow-up with bariatric surgery today with Dr. Hagan.  I told Randa I was very happy for her and expected this to change things for her.  She seems very pleased at this time.    The following portions of the patient's history were reviewed and updated as appropriate: allergies, current medications, past social history and problem list    Review of Systems   Constitutional: Negative for chills and fever.   HENT: Negative.  Negative for ear pain, sinus pain and sore throat.    Eyes: Negative for pain, redness and visual disturbance.   Respiratory: Negative for cough and shortness of breath.    Cardiovascular: Negative for chest pain, palpitations and leg swelling.   Gastrointestinal: Negative for abdominal pain, constipation, diarrhea, nausea and vomiting.   Genitourinary: Negative.    Allergic/Immunologic: Negative for immunocompromised state.   Neurological: Negative for dizziness, seizures, syncope, weakness, light-headedness and headaches.   Psychiatric/Behavioral: Negative for dysphoric mood. The patient is not nervous/anxious.         Objective     Vitals:    02/04/21 0938   BP: 126/82   Pulse: 76   Resp: 18   SpO2: 99%       Physical Exam  Constitutional:       Appearance: Normal appearance. She is obese.   HENT:      Head: Normocephalic and atraumatic.   Eyes:      Conjunctiva/sclera: Conjunctivae normal.      Pupils: Pupils are equal, round, and reactive to light.   Cardiovascular:      Rate and Rhythm: Normal rate and regular rhythm.   Pulmonary:      Effort: Pulmonary effort is normal.      Breath sounds: Normal breath sounds.   Abdominal:      Palpations: Abdomen is soft.      Tenderness: There is abdominal tenderness.      Comments: Incision sites healing well with no signs of infection.   Neurological:      Mental Status: She is alert.         Assessment/Plan   Problems Addressed this Visit        Cardiac and Vasculature    HTN (hypertension)    Relevant Medications    triamterene-hydrochlorothiazide (MAXZIDE-25) 37.5-25 MG per tablet       Endocrine and Metabolic    Morbid obesity (CMS/HCC) - Primary       Mental Health    Anxiety, generalized    Relevant Medications    FLUoxetine (PROzac) 40 MG capsule      Other Visit Diagnoses     Status post bariatric surgery        Gastric sleeve 1/28/2021    History of repair of hiatal hernia          Diagnoses       Codes Comments    Morbid obesity (CMS/HCC)    -  Primary ICD-10-CM: E66.01  ICD-9-CM: 278.01     Status post bariatric surgery     ICD-10-CM: Z98.84  ICD-9-CM: V45.86 Gastric sleeve 1/28/2021    Anxiety, generalized     ICD-10-CM: F41.1  ICD-9-CM: 300.02     Essential hypertension     ICD-10-CM: I10  ICD-9-CM: 401.9     History of repair of hiatal hernia     ICD-10-CM: Z98.890, Z87.19  ICD-9-CM: V15.29         Problem #1 continue to hold Maxide  Problem #2 continue diet per bariatric instruction  Problem #3 prescription changed  Problem #4 follow-up with bariatric surgery today as scheduled

## 2021-02-23 RX ORDER — TRIAMTERENE AND HYDROCHLOROTHIAZIDE 37.5; 25 MG/1; MG/1
1 TABLET ORAL DAILY
Qty: 30 TABLET | Refills: 5 | Status: SHIPPED | OUTPATIENT
Start: 2021-02-23 | End: 2022-03-11 | Stop reason: SDUPTHER

## 2021-02-23 NOTE — TELEPHONE ENCOUNTER
Last Office Visit: 2/4/2021  Next Office Visit:no future appt scheduled     Labs completed in past 6 months? no  Labs completed in past year? yes    Medication: Maxide   Last Refill Date: 2/4/2021  Quantity:30  Refills: 5    Pharmacy: Pharmacy:  [facility]

## 2021-02-24 ENCOUNTER — OFFICE VISIT (OUTPATIENT)
Dept: BARIATRICS/WEIGHT MGMT | Facility: CLINIC | Age: 51
End: 2021-02-24

## 2021-02-24 VITALS
BODY MASS INDEX: 35.61 KG/M2 | OXYGEN SATURATION: 99 % | TEMPERATURE: 97.4 F | RESPIRATION RATE: 18 BRPM | DIASTOLIC BLOOD PRESSURE: 64 MMHG | HEIGHT: 63 IN | SYSTOLIC BLOOD PRESSURE: 120 MMHG | WEIGHT: 201 LBS | HEART RATE: 74 BPM

## 2021-02-24 DIAGNOSIS — Z13.21 MALNUTRITION SCREEN: ICD-10-CM

## 2021-02-24 DIAGNOSIS — E66.9 OBESITY, CLASS II, BMI 35-39.9: ICD-10-CM

## 2021-02-24 DIAGNOSIS — Z13.0 SCREENING, IRON DEFICIENCY ANEMIA: ICD-10-CM

## 2021-02-24 DIAGNOSIS — E55.9 HYPOVITAMINOSIS D: ICD-10-CM

## 2021-02-24 DIAGNOSIS — Z90.3 POSTGASTRECTOMY MALABSORPTION: ICD-10-CM

## 2021-02-24 DIAGNOSIS — R53.83 FATIGUE, UNSPECIFIED TYPE: ICD-10-CM

## 2021-02-24 DIAGNOSIS — K91.2 POSTGASTRECTOMY MALABSORPTION: ICD-10-CM

## 2021-02-24 DIAGNOSIS — Z98.84 STATUS POST BARIATRIC SURGERY: Primary | ICD-10-CM

## 2021-02-24 PROCEDURE — 99024 POSTOP FOLLOW-UP VISIT: CPT | Performed by: PHYSICIAN ASSISTANT

## 2021-02-24 NOTE — PROGRESS NOTES
[facility] Bariatric Surgery  [address]  [address]  [facility] [id number] [phone]      Patient Name:  Aniya Castro.  :  1970      Reason for Visit:  1 month postop    HPI:  Aniya Castro is a 50 y.o. female s/p 21 LSG/HHR with Dr. Rao.      Doing ok, is having issues with constipation, BM once every 4 days or so. Using stool softeners, enema prn.  Also with Nausea after eating heavier/ solid foods. Only has 30 min for lunch break and feels she may be going too fast.  Doesn't use zofran or phenergan due to sedation.  Does better at dinner when she has more time. Does okay with liquids/ supplements.  Denies dysphagia, reflux, vomiting, abdominal pain, pulmonary issues and fevers.  Tolerating diet progression - on stage 4.  Getting 90-110g prot/day.  Drinking 64+ fluid oz/day.  Taking MVI, B12, B1, Calcium, Vit D, iron and Vit C.  On Pantoprazole.  Still holding ASA , NSAIDs , Tramadol, Hormones, Diuretics , Steroids and Immunologics.  Active, not exercising.     Presurgery weight:  226 pounds. Today's weight is 91.2 kg (201 lb) pounds, today's Body mass index is 36.18 kg/m²., and@ weight loss since surgery is 25 pounds.       Past Medical History:   Diagnosis Date   • Anxiety, generalized    • Duodenal ulcer     dx during ERCP 3/2019, no bx, HPSA (-), continues on daily Protonix since   • Hiatal hernia with gastroesophageal reflux     controlled w/ daily Protonix, EGD 20 w/ GDW revealed small asymmetric HH   • HTN (hypertension)    • Hyperlipidemia     borderline, no meds   • Morbid obesity (CMS/HCC)    • Organic mood disorder of depressed type    • Prediabetes     A1C 6.1   • Sleep apnea with use of continuous positive airway pressure (CPAP)     compliant with machine    • Wears glasses      Past Surgical History:   Procedure Laterality Date   • ADENOIDECTOMY     •  SECTION     • CHOLECYSTECTOMY WITH INTRAOPERATIVE CHOLANGIOGRAM N/A  3/5/2019    Procedure: CHOLECYSTECTOMY LAPAROSCOPIC WITH IOC;  Surgeon: Wali Nicole MD;  Location:  [facility];  Service: General   • EAR TUBES  1979   • ENDOSCOPY N/A 1/28/2021    Procedure: ESOPHAGOGASTRODUODENOSCOPY;  Surgeon: Bandar Rao MD;  Location:  [facility];  Service: Bariatric;  Laterality: N/A;   • ERCP N/A 3/6/2019    Procedure: ENDOSCOPIC RETROGRADE CHOLANGIOPANCREATOGRAPHY;  Surgeon: Todd Campos MD;  Location:  [facility];  Service: Gastroenterology   • GASTRIC SLEEVE LAPAROSCOPIC N/A 1/28/2021    Procedure: GASTRIC SLEEVE LAPAROSCOPIC;  Surgeon: Bandar Rao MD;  Location:  [facility];  Service: Bariatric;  Laterality: N/A;   • HIATAL HERNIA REPAIR N/A 1/28/2021    Procedure: HIATAL HERNIA REPAIR LAPAROSCOPIC;  Surgeon: Bandar Rao MD;  Location:  [facility];  Service: Bariatric;  Laterality: N/A;   • INTRAUTERINE DEVICE INSERTION  2015    due for removal   • WISDOM TOOTH EXTRACTION  1987     Outpatient Medications Marked as Taking for the 2/24/21 encounter (Office Visit) with Jigna John PA-C   Medication Sig Dispense Refill   • Biotin 2.5 MG tablet Take 2.5 mg by mouth Daily.     • docusate sodium 100 MG capsule Take 100 mg by mouth 2 (Two) Times a Day. 20 each 0   • FLUoxetine (PROzac) 40 MG capsule Take 1 capsule by mouth Daily. 30 capsule 11   • loratadine-pseudoephedrine (CLARITIN-D 24-hour)  MG per 24 hr tablet Take 1 tablet by mouth Daily. 15 tablet 0   • Multiple Vitamin (MULTI VITAMIN DAILY PO) Take 1 capsule by mouth Daily.     • ondansetron ODT (Zofran ODT) 4 MG disintegrating tablet Place 1 tablet on the tongue Every 8 (Eight) Hours As Needed for Nausea or Vomiting. 10 tablet 0   • pantoprazole (PROTONIX) 40 MG EC tablet Take 1 tablet by mouth Daily. 90 tablet 3   • triamterene-hydrochlorothiazide (MAXZIDE-25) 37.5-25 MG per tablet Take 1 tablet by mouth Daily. 30 tablet 5     Allergies   Allergen Reactions   • Sulfa Antibiotics Hives  "      Social History     Socioeconomic History   • Marital status:      Spouse name: Not on file   • Number of children: Not on file   • Years of education: Not on file   • Highest education level: Not on file   Tobacco Use   • Smoking status: Never Smoker   • Smokeless tobacco: Never Used   Substance and Sexual Activity   • Alcohol use: Yes     Comment: Occassional   • Drug use: No   • Sexual activity: Defer   Social History Narrative    Lives in [place], [place] but moving to [facility] w/ her boyfriend.  She has 3 children aged: 29, 27, and 26.  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         /64 (BP Location: Left arm, Patient Position: Sitting, Cuff Size: Large Adult)   Pulse 74   Temp 97.4 °F (36.3 °C) (Temporal)   Resp 18   Ht 158.8 cm (62.5\")   Wt 91.2 kg (201 lb)   SpO2 99%   BMI 36.18 kg/m²     Physical Exam  Constitutional:       Appearance: She is well-developed.   HENT:      Head: Normocephalic and atraumatic.   Cardiovascular:      Rate and Rhythm: Normal rate and regular rhythm.   Pulmonary:      Effort: Pulmonary effort is normal.      Breath sounds: Normal breath sounds.   Abdominal:      General: Bowel sounds are normal.      Palpations: Abdomen is soft.      Comments: Incisions healing well   Skin:     General: Skin is warm and dry.   Neurological:      Mental Status: She is alert.   Psychiatric:         Mood and Affect: Mood normal.         Behavior: Behavior normal.         Thought Content: Thought content normal.         Judgment: Judgment normal.           Assessment:  1 month s/p 1/28/21 LSG/HHR with Dr. Rao.      ICD-10-CM ICD-9-CM   1. Status post bariatric surgery  Z98.84 V45.86   2. Obesity, Class II, BMI 35-39.9  E66.9 278.00   3. Fatigue, unspecified type  R53.83 780.79   4. Screening, iron deficiency anemia  Z13.0 V78.0   5. Malnutrition screen  Z13.21 V77.2   6. Postgastrectomy malabsorption  K91.2 579.3    Z90.3    7. " Hypovitaminosis D  E55.9 268.9         Plan:  Advised BID miralax to improve BM.  Discussed behavioral modification, eating smaller portions, slower. Seems to do better when not rushing during work breaks. Okay to go slow with diet progression. Offered UGI for eval, will hold off for now and let us know if symptoms persist or worsen. Offered antiemetics, declined due to sedation.   Continue protein 70-100g/day.  Encouraged good food choices - high protein, low carb.  Continue fluids 64oz per day. Continue routine exercise, lifting restrictions lifted.  Continue PPI. Continue to avoid NSAIDS, ASA, tramadol, tobacco x 6 weeks postop, steroids x 8 weeks postop.  Routine bariatric labs ordered.  Continue vitamins w/ adjustments pending lab results.  Call w/ problems/concerns.    Patient's Body mass index is 36.18 kg/m². BMI is above normal parameters. Recommendations include: exercise counseling and nutrition counseling.        The patient was instructed to follow up in 2 months, sooner if needed.

## 2021-02-26 ENCOUNTER — LAB (OUTPATIENT)
Dept: LAB | Facility: HOSPITAL | Age: 51
End: 2021-02-26

## 2021-02-26 DIAGNOSIS — R53.83 FATIGUE, UNSPECIFIED TYPE: ICD-10-CM

## 2021-02-26 DIAGNOSIS — Z90.3 POSTGASTRECTOMY MALABSORPTION: ICD-10-CM

## 2021-02-26 DIAGNOSIS — Z13.21 MALNUTRITION SCREEN: ICD-10-CM

## 2021-02-26 DIAGNOSIS — E66.9 OBESITY, CLASS II, BMI 35-39.9: ICD-10-CM

## 2021-02-26 DIAGNOSIS — K91.2 POSTGASTRECTOMY MALABSORPTION: ICD-10-CM

## 2021-02-26 DIAGNOSIS — Z13.0 SCREENING, IRON DEFICIENCY ANEMIA: ICD-10-CM

## 2021-02-26 DIAGNOSIS — E55.9 HYPOVITAMINOSIS D: ICD-10-CM

## 2021-02-26 LAB
25(OH)D3 SERPL-MCNC: 68.4 NG/ML (ref 30–100)
ALBUMIN SERPL-MCNC: 4.5 G/DL (ref 3.5–5.2)
ALBUMIN/GLOB SERPL: 1.7 G/DL
ALP SERPL-CCNC: 72 U/L (ref 39–117)
ALT SERPL W P-5'-P-CCNC: 21 U/L (ref 1–33)
ANION GAP SERPL CALCULATED.3IONS-SCNC: 11.9 MMOL/L (ref 5–15)
AST SERPL-CCNC: 21 U/L (ref 1–32)
BASOPHILS # BLD AUTO: 0.06 10*3/MM3 (ref 0–0.2)
BASOPHILS NFR BLD AUTO: 0.9 % (ref 0–1.5)
BILIRUB SERPL-MCNC: 0.4 MG/DL (ref 0–1.2)
BUN SERPL-MCNC: 16 MG/DL (ref 6–20)
BUN/CREAT SERPL: 21.9 (ref 7–25)
CALCIUM SPEC-SCNC: 8.9 MG/DL (ref 8.6–10.5)
CHLORIDE SERPL-SCNC: 104 MMOL/L (ref 98–107)
CO2 SERPL-SCNC: 25.1 MMOL/L (ref 22–29)
CREAT SERPL-MCNC: 0.73 MG/DL (ref 0.57–1)
DEPRECATED RDW RBC AUTO: 42.7 FL (ref 37–54)
EOSINOPHIL # BLD AUTO: 0.32 10*3/MM3 (ref 0–0.4)
EOSINOPHIL NFR BLD AUTO: 4.9 % (ref 0.3–6.2)
ERYTHROCYTE [DISTWIDTH] IN BLOOD BY AUTOMATED COUNT: 13.1 % (ref 12.3–15.4)
FERRITIN SERPL-MCNC: 397 NG/ML (ref 13–150)
FOLATE SERPL-MCNC: 16.5 NG/ML (ref 4.78–24.2)
GFR SERPL CREATININE-BSD FRML MDRD: 85 ML/MIN/1.73
GLOBULIN UR ELPH-MCNC: 2.6 GM/DL
GLUCOSE SERPL-MCNC: 89 MG/DL (ref 65–99)
HCT VFR BLD AUTO: 42.3 % (ref 34–46.6)
HGB BLD-MCNC: 14.3 G/DL (ref 12–15.9)
IMM GRANULOCYTES # BLD AUTO: 0.02 10*3/MM3 (ref 0–0.05)
IMM GRANULOCYTES NFR BLD AUTO: 0.3 % (ref 0–0.5)
IRON 24H UR-MRATE: 57 MCG/DL (ref 37–145)
LYMPHOCYTES # BLD AUTO: 1.81 10*3/MM3 (ref 0.7–3.1)
LYMPHOCYTES NFR BLD AUTO: 27.7 % (ref 19.6–45.3)
MCH RBC QN AUTO: 29.9 PG (ref 26.6–33)
MCHC RBC AUTO-ENTMCNC: 33.8 G/DL (ref 31.5–35.7)
MCV RBC AUTO: 88.3 FL (ref 79–97)
MONOCYTES # BLD AUTO: 0.67 10*3/MM3 (ref 0.1–0.9)
MONOCYTES NFR BLD AUTO: 10.3 % (ref 5–12)
NEUTROPHILS NFR BLD AUTO: 3.65 10*3/MM3 (ref 1.7–7)
NEUTROPHILS NFR BLD AUTO: 55.9 % (ref 42.7–76)
NRBC BLD AUTO-RTO: 0 /100 WBC (ref 0–0.2)
PLATELET # BLD AUTO: 335 10*3/MM3 (ref 140–450)
PMV BLD AUTO: 11.8 FL (ref 6–12)
POTASSIUM SERPL-SCNC: 3.6 MMOL/L (ref 3.5–5.2)
PREALB SERPL-MCNC: 21.1 MG/DL (ref 20–40)
PROT SERPL-MCNC: 7.1 G/DL (ref 6–8.5)
RBC # BLD AUTO: 4.79 10*6/MM3 (ref 3.77–5.28)
SODIUM SERPL-SCNC: 141 MMOL/L (ref 136–145)
WBC # BLD AUTO: 6.53 10*3/MM3 (ref 3.4–10.8)

## 2021-02-26 PROCEDURE — 82728 ASSAY OF FERRITIN: CPT

## 2021-02-26 PROCEDURE — 83921 ORGANIC ACID SINGLE QUANT: CPT

## 2021-02-26 PROCEDURE — 82746 ASSAY OF FOLIC ACID SERUM: CPT

## 2021-02-26 PROCEDURE — 80053 COMPREHEN METABOLIC PANEL: CPT

## 2021-02-26 PROCEDURE — 83540 ASSAY OF IRON: CPT

## 2021-02-26 PROCEDURE — 36415 COLL VENOUS BLD VENIPUNCTURE: CPT

## 2021-02-26 PROCEDURE — 85025 COMPLETE CBC W/AUTO DIFF WBC: CPT

## 2021-02-26 PROCEDURE — 82306 VITAMIN D 25 HYDROXY: CPT

## 2021-02-26 PROCEDURE — 84134 ASSAY OF PREALBUMIN: CPT

## 2021-02-26 PROCEDURE — 84425 ASSAY OF VITAMIN B-1: CPT

## 2021-03-04 LAB
Lab: NORMAL
METHYLMALONATE SERPL-SCNC: 214 NMOL/L (ref 0–378)
VIT B1 BLD-SCNC: 169.1 NMOL/L (ref 66.5–200)

## 2021-04-13 ENCOUNTER — OFFICE VISIT (OUTPATIENT)
Dept: FAMILY MEDICINE CLINIC | Facility: CLINIC | Age: 51
End: 2021-04-13

## 2021-04-13 VITALS
TEMPERATURE: 97.1 F | SYSTOLIC BLOOD PRESSURE: 118 MMHG | DIASTOLIC BLOOD PRESSURE: 68 MMHG | OXYGEN SATURATION: 98 % | WEIGHT: 192.8 LBS | BODY MASS INDEX: 34.7 KG/M2 | HEART RATE: 93 BPM

## 2021-04-13 DIAGNOSIS — J01.90 ACUTE SINUSITIS, RECURRENCE NOT SPECIFIED, UNSPECIFIED LOCATION: Primary | ICD-10-CM

## 2021-04-13 PROCEDURE — 99213 OFFICE O/P EST LOW 20 MIN: CPT | Performed by: FAMILY MEDICINE

## 2021-04-13 RX ORDER — CEFDINIR 300 MG/1
300 CAPSULE ORAL 2 TIMES DAILY
Qty: 20 CAPSULE | Refills: 0 | Status: SHIPPED | OUTPATIENT
Start: 2021-04-13 | End: 2021-05-06

## 2021-04-13 RX ORDER — PREDNISONE 10 MG/1
TABLET ORAL
Qty: 30 TABLET | Refills: 0 | Status: SHIPPED | OUTPATIENT
Start: 2021-04-13 | End: 2021-05-06

## 2021-04-13 NOTE — PROGRESS NOTES
Subjective   Aniya Castro is a 50 y.o. female    Chief Complaint    Headache  Sinus pressure  Earache  Sore throat  Nasal congestion    History of Present Illness  The patient presents as above.  She complains her gums and teeth hurt as well. The patient said these symptoms have been going on for a week and are getting worse and worse.  She has been taking Sudafed and Claritin daily. She has pain over her forehead.     The patient has had bariatric surgery and feels she should be losing weight more quickly than she is. She said she is losing 2.5 pounds a week.     The patient is allergic to SULFA.     The following portions of the patient's history were reviewed and updated as appropriate: allergies, current medications, past social history and problem list    Review of Systems   Constitutional: Negative for chills, fatigue and fever.   HENT: Positive for congestion, ear pain, postnasal drip, rhinorrhea and sinus pressure. Negative for sore throat.    Eyes: Positive for pain.   Respiratory: Positive for cough. Negative for shortness of breath.    Neurological: Positive for headaches. Negative for dizziness.   Hematological: Negative for adenopathy.       Objective     Vitals:    04/13/21 0950   BP: 118/68   Pulse: 93   Temp: 97.1 °F (36.2 °C)   SpO2: 98%       Physical Exam  Vitals and nursing note reviewed.   Constitutional:       Appearance: She is well-developed.   HENT:      Head: Normocephalic and atraumatic.      Right Ear: Tympanic membrane and ear canal normal.      Left Ear: Tympanic membrane and ear canal normal.      Nose: Mucosal edema and rhinorrhea present.      Right Sinus: Maxillary sinus tenderness and frontal sinus tenderness present.      Left Sinus: Maxillary sinus tenderness and frontal sinus tenderness present.      Mouth/Throat:      Pharynx: No oropharyngeal exudate.   Eyes:      Pupils: Pupils are equal, round, and reactive to light.   Cardiovascular:      Rate and Rhythm: Normal  rate and regular rhythm.   Pulmonary:      Effort: Pulmonary effort is normal.      Breath sounds: Normal breath sounds.         Assessment/Plan   Problems Addressed this Visit     None      Visit Diagnoses     Acute sinusitis, recurrence not specified, unspecified location    -  Primary    Relevant Medications    cefdinir (OMNICEF) 300 MG capsule    predniSONE (DELTASONE) 10 MG tablet      Diagnoses       Codes Comments    Acute sinusitis, recurrence not specified, unspecified location    -  Primary ICD-10-CM: J01.90  ICD-9-CM: 461.9         Continue Claritin and Sudafed.       Scribed for FREDY Duggan MD by Kenzie Evangelista.  04/13/21   11:04 EDT    I have personally performed the services described in this document as scribed by the above individual, and it is both accurate and complete.  FREDY Duggan MD  4/13/2021  16:46 EDT

## 2021-04-20 ENCOUNTER — TELEPHONE (OUTPATIENT)
Dept: FAMILY MEDICINE CLINIC | Facility: CLINIC | Age: 51
End: 2021-04-20

## 2021-04-20 RX ORDER — FLUCONAZOLE 150 MG/1
150 TABLET ORAL ONCE
Qty: 1 TABLET | Refills: 0 | Status: SHIPPED | OUTPATIENT
Start: 2021-04-20 | End: 2021-04-21

## 2021-04-20 NOTE — TELEPHONE ENCOUNTER
Caller: Aniya Castro    Relationship: Self    Best call back number: [phone]     What medication are you requesting: SOMETHING TO TREAT A YEAST INFECTION    What are your current symptoms: DISCHARGE AND ITCHY    How long have you been experiencing symptoms: 3 DAYS    Have you had these symptoms before:    [x] Yes  [] No    Have you been treated for these symptoms before:   [x] Yes  [] No    If a prescription is needed, what is your preferred pharmacy and phone number:  [facility]        Additional notes:

## 2021-05-06 ENCOUNTER — OFFICE VISIT (OUTPATIENT)
Dept: BARIATRICS/WEIGHT MGMT | Facility: CLINIC | Age: 51
End: 2021-05-06

## 2021-05-06 ENCOUNTER — LAB (OUTPATIENT)
Dept: LAB | Facility: HOSPITAL | Age: 51
End: 2021-05-06

## 2021-05-06 VITALS
DIASTOLIC BLOOD PRESSURE: 68 MMHG | BODY MASS INDEX: 33.58 KG/M2 | WEIGHT: 189.5 LBS | TEMPERATURE: 98 F | HEIGHT: 63 IN | HEART RATE: 72 BPM | RESPIRATION RATE: 18 BRPM | SYSTOLIC BLOOD PRESSURE: 110 MMHG | OXYGEN SATURATION: 99 %

## 2021-05-06 DIAGNOSIS — R53.83 FATIGUE, UNSPECIFIED TYPE: Primary | ICD-10-CM

## 2021-05-06 DIAGNOSIS — R53.83 FATIGUE, UNSPECIFIED TYPE: ICD-10-CM

## 2021-05-06 DIAGNOSIS — R73.03 PREDIABETES: ICD-10-CM

## 2021-05-06 DIAGNOSIS — K91.2 POSTGASTRECTOMY MALABSORPTION: ICD-10-CM

## 2021-05-06 DIAGNOSIS — Z13.0 SCREENING, IRON DEFICIENCY ANEMIA: ICD-10-CM

## 2021-05-06 DIAGNOSIS — G47.30 SLEEP APNEA WITH USE OF CONTINUOUS POSITIVE AIRWAY PRESSURE (CPAP): ICD-10-CM

## 2021-05-06 DIAGNOSIS — Z13.21 MALNUTRITION SCREEN: ICD-10-CM

## 2021-05-06 DIAGNOSIS — Z90.3 POSTGASTRECTOMY MALABSORPTION: ICD-10-CM

## 2021-05-06 DIAGNOSIS — E55.9 HYPOVITAMINOSIS D: ICD-10-CM

## 2021-05-06 DIAGNOSIS — K27.9 PEPTIC ULCER: ICD-10-CM

## 2021-05-06 LAB
25(OH)D3 SERPL-MCNC: 67.6 NG/ML
ALBUMIN SERPL-MCNC: 4.1 G/DL (ref 3.5–5.2)
ALBUMIN/GLOB SERPL: 1.5 G/DL
ALP SERPL-CCNC: 74 U/L (ref 39–117)
ALT SERPL W P-5'-P-CCNC: 11 U/L (ref 1–33)
ANION GAP SERPL CALCULATED.3IONS-SCNC: 9 MMOL/L (ref 5–15)
AST SERPL-CCNC: 14 U/L (ref 1–32)
BILIRUB SERPL-MCNC: 0.4 MG/DL (ref 0–1.2)
BUN SERPL-MCNC: 11 MG/DL (ref 6–20)
BUN/CREAT SERPL: 14.5 (ref 7–25)
CALCIUM SPEC-SCNC: 9.3 MG/DL (ref 8.6–10.5)
CHLORIDE SERPL-SCNC: 102 MMOL/L (ref 98–107)
CO2 SERPL-SCNC: 26 MMOL/L (ref 22–29)
CREAT SERPL-MCNC: 0.76 MG/DL (ref 0.57–1)
ERYTHROCYTE [DISTWIDTH] IN BLOOD BY AUTOMATED COUNT: 13.7 % (ref 12.3–15.4)
FERRITIN SERPL-MCNC: 355 NG/ML (ref 13–150)
FOLATE SERPL-MCNC: 12.3 NG/ML (ref 4.78–24.2)
GFR SERPL CREATININE-BSD FRML MDRD: 81 ML/MIN/1.73
GLOBULIN UR ELPH-MCNC: 2.7 GM/DL
GLUCOSE SERPL-MCNC: 148 MG/DL (ref 65–99)
HCT VFR BLD AUTO: 39.1 % (ref 34–46.6)
HGB BLD-MCNC: 12.9 G/DL (ref 12–15.9)
IRON 24H UR-MRATE: 50 MCG/DL (ref 37–145)
LYMPHOCYTES # BLD AUTO: 2.7 10*3/MM3 (ref 0.7–3.1)
LYMPHOCYTES NFR BLD AUTO: 35.4 % (ref 19.6–45.3)
MCH RBC QN AUTO: 28.4 PG (ref 26.6–33)
MCHC RBC AUTO-ENTMCNC: 32.9 G/DL (ref 31.5–35.7)
MCV RBC AUTO: 86.3 FL (ref 79–97)
MONOCYTES # BLD AUTO: 0.5 10*3/MM3 (ref 0.1–0.9)
MONOCYTES NFR BLD AUTO: 6.1 % (ref 5–12)
NEUTROPHILS NFR BLD AUTO: 4.4 10*3/MM3 (ref 1.7–7)
NEUTROPHILS NFR BLD AUTO: 58.5 % (ref 42.7–76)
PLATELET # BLD AUTO: 291 10*3/MM3 (ref 140–450)
PMV BLD AUTO: 8.6 FL (ref 6–12)
POTASSIUM SERPL-SCNC: 3.5 MMOL/L (ref 3.5–5.2)
PREALB SERPL-MCNC: 21.6 MG/DL (ref 20–40)
PROT SERPL-MCNC: 6.8 G/DL (ref 6–8.5)
RBC # BLD AUTO: 4.53 10*6/MM3 (ref 3.77–5.28)
SODIUM SERPL-SCNC: 137 MMOL/L (ref 136–145)
WBC # BLD AUTO: 7.6 10*3/MM3 (ref 3.4–10.8)

## 2021-05-06 PROCEDURE — 36415 COLL VENOUS BLD VENIPUNCTURE: CPT

## 2021-05-06 PROCEDURE — 82306 VITAMIN D 25 HYDROXY: CPT

## 2021-05-06 PROCEDURE — 82728 ASSAY OF FERRITIN: CPT

## 2021-05-06 PROCEDURE — 82746 ASSAY OF FOLIC ACID SERUM: CPT

## 2021-05-06 PROCEDURE — 85025 COMPLETE CBC W/AUTO DIFF WBC: CPT

## 2021-05-06 PROCEDURE — 99213 OFFICE O/P EST LOW 20 MIN: CPT | Performed by: SURGERY

## 2021-05-06 PROCEDURE — 80053 COMPREHEN METABOLIC PANEL: CPT

## 2021-05-06 PROCEDURE — 83540 ASSAY OF IRON: CPT

## 2021-05-06 PROCEDURE — 83921 ORGANIC ACID SINGLE QUANT: CPT

## 2021-05-06 PROCEDURE — 84425 ASSAY OF VITAMIN B-1: CPT

## 2021-05-06 PROCEDURE — 84134 ASSAY OF PREALBUMIN: CPT

## 2021-05-06 NOTE — PROGRESS NOTES
[facility] Bariatric Surgery  [address]  [address]  [facility] [id number] [phone]        Patient Name:  Aniya Castro.  :  1970      Date of Visit: 2021      Reason for Visit:   3 months postop     HPI: Aniya Castro is a 50 y.o. female s/p 21 LSG/HHR with Dr. Rao.      Doing well.  No issues/concerns. Denies dysphagia, reflux, nausea, vomiting and abdominal pain.  Getting 80 g prot/day.  Drinking 60 fluid oz/day.  1 month labs revealed no vitamin deficiencies. Taking MVI, B12, B1, Calcium, Vit D, iron and Vit C.  On Pantoprazole--will stay on for peptic ulcer hx per Dr. Campos.  Exercise: elliptical, 2 miles daily for 20-30 min.       Presurgery weight: 226 pounds.  Today's weight is 86 kg (189 lb 8 oz) pounds, today's  Body mass index is 34.11 kg/m²., and@ weight loss since surgery is 37 pounds.      Past Medical History:   Diagnosis Date   • Anxiety, generalized    • Duodenal ulcer     dx during ERCP 3/2019, no bx, HPSA (-), continues on daily Protonix since   • Hiatal hernia with gastroesophageal reflux     controlled w/ daily Protonix, EGD 20 w/ GDW revealed small asymmetric HH   • HTN (hypertension)    • Hyperlipidemia     borderline, no meds   • Morbid obesity (CMS/HCC)    • Organic mood disorder of depressed type    • Prediabetes     A1C 6.1   • Sleep apnea with use of continuous positive airway pressure (CPAP)     compliant with machine    • Wears glasses      Past Surgical History:   Procedure Laterality Date   • ADENOIDECTOMY     •  SECTION     • CHOLECYSTECTOMY WITH INTRAOPERATIVE CHOLANGIOGRAM N/A 3/5/2019    Procedure: CHOLECYSTECTOMY LAPAROSCOPIC WITH IOC;  Surgeon: Wali Nicole MD;  Location: [facility] OR;  Service: General   • EAR TUBES     • ENDOSCOPY N/A 2021    Procedure: ESOPHAGOGASTRODUODENOSCOPY;  Surgeon: Bandar Rao MD;  Location:  [facility] OR;  Service: Bariatric;  Laterality: N/A;   •  ERCP N/A 3/6/2019    Procedure: ENDOSCOPIC RETROGRADE CHOLANGIOPANCREATOGRAPHY;  Surgeon: Todd Campos MD;  Location:  [facility] ENDOSCOPY;  Service: Gastroenterology   • GASTRIC SLEEVE LAPAROSCOPIC N/A 1/28/2021    Procedure: GASTRIC SLEEVE LAPAROSCOPIC;  Surgeon: Bandar Rao MD;  Location:  [facility] OR;  Service: Bariatric;  Laterality: N/A;   • HIATAL HERNIA REPAIR N/A 1/28/2021    Procedure: HIATAL HERNIA REPAIR LAPAROSCOPIC;  Surgeon: Bandar Rao MD;  Location:  [facility] OR;  Service: Bariatric;  Laterality: N/A;   • INTRAUTERINE DEVICE INSERTION  2015    due for removal   • WISDOM TOOTH EXTRACTION  1987     Outpatient Medications Marked as Taking for the 5/6/21 encounter (Office Visit) with Violette Hagan MD   Medication Sig Dispense Refill   • Biotin 2.5 MG tablet Take 2.5 mg by mouth Daily.     • docusate sodium 100 MG capsule Take 100 mg by mouth 2 (Two) Times a Day. 20 each 0   • FLUoxetine (PROzac) 40 MG capsule Take 1 capsule by mouth Daily. 30 capsule 11   • HYDROcodone-acetaminophen (NORCO) 7.5-325 MG per tablet Take 1 tablet by mouth Every 4-6 Hours As Needed for pain 20 tablet 0   • loratadine-pseudoephedrine (CLARITIN-D 24-hour)  MG per 24 hr tablet Take 1 tablet by mouth Daily. 15 tablet 0   • Multiple Vitamin (MULTI VITAMIN DAILY PO) Take 1 capsule by mouth Daily.     • pantoprazole (PROTONIX) 40 MG EC tablet Take 1 tablet by mouth Daily. 90 tablet 3   • triamterene-hydrochlorothiazide (MAXZIDE-25) 37.5-25 MG per tablet Take 1 tablet by mouth Daily. 30 tablet 5   • [DISCONTINUED] cefdinir (OMNICEF) 300 MG capsule Take 1 capsule by mouth 2 (Two) Times a Day. 20 capsule 0       Allergies   Allergen Reactions   • Sulfa Antibiotics Hives       Social History     Socioeconomic History   • Marital status:      Spouse name: Not on file   • Number of children: Not on file   • Years of education: Not on file   • Highest education level: Not on file   Tobacco Use   •  "Smoking status: Never Smoker   • Smokeless tobacco: Never Used   Vaping Use   • Vaping Use: Never used   Substance and Sexual Activity   • Alcohol use: Yes     Comment: Occassional   • Drug use: No   • Sexual activity: Defer       /68 (BP Location: Left arm, Patient Position: Sitting, Cuff Size: Large Adult)   Pulse 72   Temp 98 °F (36.7 °C) (Temporal)   Resp 18   Ht 158.8 cm (62.5\")   Wt 86 kg (189 lb 8 oz)   SpO2 99%   BMI 34.11 kg/m²     Physical Exam  Constitutional:       General: She is not in acute distress.     Appearance: She is well-developed. She is not diaphoretic.   HENT:      Head: Normocephalic and atraumatic.      Mouth/Throat:      Pharynx: No oropharyngeal exudate.   Eyes:      Conjunctiva/sclera: Conjunctivae normal.      Pupils: Pupils are equal, round, and reactive to light.   Pulmonary:      Effort: Pulmonary effort is normal. No respiratory distress.   Abdominal:      General: There is no distension.      Palpations: Abdomen is soft.   Skin:     General: Skin is warm and dry.      Coloration: Skin is not pale.   Neurological:      Mental Status: She is alert and oriented to person, place, and time.      Cranial Nerves: No cranial nerve deficit.   Psychiatric:         Behavior: Behavior normal.         Thought Content: Thought content normal.           Assessment:  3 months s/p 1/28/21 LSG/HHR with Dr. Rao.     ICD-10-CM ICD-9-CM   1. Fatigue, unspecified type  R53.83 780.79   2. Hypovitaminosis D  E55.9 268.9   3. Malnutrition screen  Z13.21 V77.2   4. Screening, iron deficiency anemia  Z13.0 V78.0   5. Postgastrectomy malabsorption  K91.2 579.3    Z90.3    6. Sleep apnea with use of continuous positive airway pressure (CPAP)  G47.30 327.23   7. Prediabetes  R73.03 790.29   8. Peptic ulcer  K27.9 533.90         Plan:  Doing well. Continue w/ good food choices and healthy habits.  Continue protein >70g/day.  Continue routine exercise.  Routine bariatric labs ordered.  Continue " vitamins w/ adjustments pending lab results.  Call w/ problems/concerns.     The patient was instructed to follow up in 3 months, sooner if needed.    note: approx 15 of the 25 minute visit was spent counseling on nutrition and necessary dietary/lifestyle modifications face to face.    Violette Hagan MD

## 2021-05-11 LAB
Lab: NORMAL
METHYLMALONATE SERPL-SCNC: 190 NMOL/L (ref 0–378)
VIT B1 BLD-SCNC: 191 NMOL/L (ref 66.5–200)

## 2021-08-30 RX ORDER — PANTOPRAZOLE SODIUM 40 MG/1
40 TABLET, DELAYED RELEASE ORAL DAILY
Qty: 90 TABLET | Refills: 3 | Status: SHIPPED | OUTPATIENT
Start: 2021-08-30 | End: 2023-01-20 | Stop reason: SDUPTHER

## 2021-10-06 ENCOUNTER — IMMUNIZATION (OUTPATIENT)
Dept: VACCINE CLINIC | Facility: HOSPITAL | Age: 51
End: 2021-10-06

## 2021-10-06 PROCEDURE — 91300 HC SARSCOV02 VAC 30MCG/0.3ML IM: CPT | Performed by: INTERNAL MEDICINE

## 2021-10-06 PROCEDURE — 0004A ADM SARSCOV2 30MCG/0.3ML BOOSTER: CPT | Performed by: INTERNAL MEDICINE

## 2021-10-06 PROCEDURE — 0003A: CPT | Performed by: INTERNAL MEDICINE

## 2021-10-11 RX ORDER — VALACYCLOVIR HYDROCHLORIDE 500 MG/1
1000 TABLET, FILM COATED ORAL 3 TIMES DAILY
Qty: 42 TABLET | Refills: 0 | Status: SHIPPED | OUTPATIENT
Start: 2021-10-11

## 2021-10-11 NOTE — PROGRESS NOTES
New complaint of localized vesicular, painful and itchy rash to right buttocks/hip, does not cross midline, appears to be consistent with shingles. Will start her on valacyclovir 1000 mg Q8 hours for 7 days. New RX sent to pharmacy.

## 2022-01-09 PROCEDURE — U0004 COV-19 TEST NON-CDC HGH THRU: HCPCS | Performed by: NURSE PRACTITIONER

## 2022-01-11 ENCOUNTER — TELEPHONE (OUTPATIENT)
Dept: URGENT CARE | Facility: CLINIC | Age: 52
End: 2022-01-11

## 2022-03-11 RX ORDER — FLUOXETINE HYDROCHLORIDE 40 MG/1
40 CAPSULE ORAL DAILY
Qty: 30 CAPSULE | Refills: 2 | Status: SHIPPED | OUTPATIENT
Start: 2022-03-11 | End: 2023-01-20 | Stop reason: SDUPTHER

## 2022-03-11 RX ORDER — TRIAMTERENE AND HYDROCHLOROTHIAZIDE 37.5; 25 MG/1; MG/1
1 TABLET ORAL DAILY
Qty: 30 TABLET | Refills: 2 | Status: SHIPPED | OUTPATIENT
Start: 2022-03-11 | End: 2022-06-14 | Stop reason: SDUPTHER

## 2022-06-14 RX ORDER — TRIAMTERENE AND HYDROCHLOROTHIAZIDE 37.5; 25 MG/1; MG/1
1 TABLET ORAL DAILY
Qty: 30 TABLET | Refills: 0 | Status: SHIPPED | OUTPATIENT
Start: 2022-06-14 | End: 2023-01-20 | Stop reason: SDUPTHER

## 2022-06-14 NOTE — TELEPHONE ENCOUNTER
Rx Refill Note  Requested Prescriptions     Pending Prescriptions Disp Refills   • triamterene-hydrochlorothiazide (MAXZIDE-25) 37.5-25 MG per tablet 30 tablet 2     Sig: Take 1 tablet by mouth Daily.      Last office visit with prescribing clinician: 4/13/2021      Next office visit with prescribing clinician: Visit date not found            VENKATA YBARRA MA  06/14/22, 16:12 EDT

## 2022-06-29 ENCOUNTER — TRANSCRIBE ORDERS (OUTPATIENT)
Dept: LAB | Facility: HOSPITAL | Age: 52
End: 2022-06-29

## 2022-06-29 ENCOUNTER — LAB (OUTPATIENT)
Dept: LAB | Facility: HOSPITAL | Age: 52
End: 2022-06-29

## 2022-06-29 LAB
25(OH)D3 SERPL-MCNC: 63.6 NG/ML (ref 30–100)
FOLATE SERPL-MCNC: >20 NG/ML (ref 4.78–24.2)
HBA1C MFR BLD: 5.4 % (ref 4.8–5.6)
TSH SERPL DL<=0.05 MIU/L-ACNC: 2.58 UIU/ML (ref 0.27–4.2)
VIT B12 BLD-MCNC: 1790 PG/ML (ref 211–946)

## 2022-06-29 PROCEDURE — 84443 ASSAY THYROID STIM HORMONE: CPT

## 2022-06-29 PROCEDURE — 82306 VITAMIN D 25 HYDROXY: CPT

## 2022-06-29 PROCEDURE — 82746 ASSAY OF FOLIC ACID SERUM: CPT

## 2022-06-29 PROCEDURE — 36415 COLL VENOUS BLD VENIPUNCTURE: CPT

## 2022-06-29 PROCEDURE — 82607 VITAMIN B-12: CPT

## 2022-06-29 PROCEDURE — 83036 HEMOGLOBIN GLYCOSYLATED A1C: CPT

## 2022-10-03 ENCOUNTER — IMMUNIZATION (OUTPATIENT)
Dept: VACCINE CLINIC | Facility: HOSPITAL | Age: 52
End: 2022-10-03

## 2022-10-03 DIAGNOSIS — Z23 NEED FOR VACCINATION: Primary | ICD-10-CM

## 2022-10-03 PROCEDURE — 91312 HC SARSCOV2 VAC 30MCG/0.3ML IM BIVALENT BOOSTER 12 YRS AND OLDER: CPT | Performed by: HOSPITALIST

## 2022-10-03 PROCEDURE — 0124A: CPT | Performed by: HOSPITALIST

## 2023-01-20 ENCOUNTER — OFFICE VISIT (OUTPATIENT)
Dept: FAMILY MEDICINE CLINIC | Facility: CLINIC | Age: 53
End: 2023-01-20
Payer: COMMERCIAL

## 2023-01-20 VITALS
HEART RATE: 80 BPM | WEIGHT: 186.2 LBS | DIASTOLIC BLOOD PRESSURE: 82 MMHG | OXYGEN SATURATION: 99 % | RESPIRATION RATE: 16 BRPM | HEIGHT: 63 IN | SYSTOLIC BLOOD PRESSURE: 126 MMHG | BODY MASS INDEX: 32.99 KG/M2 | TEMPERATURE: 98 F

## 2023-01-20 DIAGNOSIS — E66.01 MORBID OBESITY: ICD-10-CM

## 2023-01-20 DIAGNOSIS — J35.8 TONSILLITH: ICD-10-CM

## 2023-01-20 DIAGNOSIS — I10 ESSENTIAL HYPERTENSION: Primary | ICD-10-CM

## 2023-01-20 DIAGNOSIS — Z98.84 STATUS POST BARIATRIC SURGERY: ICD-10-CM

## 2023-01-20 DIAGNOSIS — F41.1 ANXIETY, GENERALIZED: ICD-10-CM

## 2023-01-20 PROCEDURE — 99214 OFFICE O/P EST MOD 30 MIN: CPT | Performed by: FAMILY MEDICINE

## 2023-01-20 RX ORDER — FLUOXETINE HYDROCHLORIDE 40 MG/1
40 CAPSULE ORAL DAILY
Qty: 90 CAPSULE | Refills: 3 | Status: SHIPPED | OUTPATIENT
Start: 2023-01-20

## 2023-01-20 RX ORDER — TRIAMTERENE AND HYDROCHLOROTHIAZIDE 37.5; 25 MG/1; MG/1
1 TABLET ORAL DAILY
Qty: 90 TABLET | Refills: 3 | Status: SHIPPED | OUTPATIENT
Start: 2023-01-20

## 2023-01-20 RX ORDER — PANTOPRAZOLE SODIUM 40 MG/1
40 TABLET, DELAYED RELEASE ORAL DAILY
Qty: 90 TABLET | Refills: 3 | Status: SHIPPED | OUTPATIENT
Start: 2023-01-20

## 2023-01-20 NOTE — PROGRESS NOTES
Subjective   Aniya PERDOMO is a 52 y.o. female    Chief Complaint    Hypertension  Gastroesophageal reflux disease  Anxiety    History of Present Illness  The patient presents after several months for follow-up regarding her hypertension, gastroesophageal reflux disease, and anxiety. She is due for several refills including Maxzide, Protonix, and Prozac. Her blood pressure today is 126/82 mmHg. Her BMI is 33.5 kg/m2.    She reports she is doing well. The patient said she has not lost the weight she wanted to lose. She said she feels like a failure. The patient said she tried Saxenda injections, but her insurance would not cover them. She was given 17 days' worth of the sample box, and it was working great. The patient said she got a letter stating her doctor could appeal the denial but that doctor would not fill out the form. She said she has not had a C-peptide test in the past. The patient said she has lost approximately 50 pounds.    The patient said she has been going to Dr. Campos for her Protonix, but she does not want to pay 60 dollars to visit him for a prescription.    The patient said she has had a sore throat. She had a really bad sinus infection after Kalpana up until last week. The patient said she is much better, and she is not congested. She does not have a headache, but she still has a sore throat. The patient said she still has a tonsil stone. She tried to take a Q-tip, but she can not get it out. The patient said she has been gargling.    The following portions of the patient's history were reviewed and updated as appropriate: allergies, current medications, past social history and problem list    Review of Systems   Constitutional: Negative for appetite change, chills, fatigue, fever and unexpected weight change.   Respiratory: Negative for cough, chest tightness and shortness of breath.    Cardiovascular: Negative for chest pain, palpitations and leg swelling.   Gastrointestinal: Positive  for abdominal distention. Negative for abdominal pain, blood in stool, constipation, diarrhea, nausea and vomiting.   Genitourinary: Negative for difficulty urinating and dysuria.   Musculoskeletal: Negative for back pain.   Skin: Negative for color change and rash.   Allergic/Immunologic: Negative for food allergies.   Neurological: Negative for dizziness, tremors, syncope, weakness, light-headedness and headaches.   Psychiatric/Behavioral: Positive for dysphoric mood and sleep disturbance. Negative for agitation, behavioral problems, confusion, decreased concentration and suicidal ideas. The patient is nervous/anxious.        Objective     Vitals:    01/20/23 1356   BP: 126/82   Pulse: 80   Resp: 16   Temp: 98 °F (36.7 °C)   SpO2: 99%       Physical Exam  Vitals and nursing note reviewed.   Constitutional:       General: She is not in acute distress.     Appearance: Normal appearance. She is well-developed. She is not ill-appearing, toxic-appearing or diaphoretic.   Neck:      Vascular: No carotid bruit or JVD.   Cardiovascular:      Rate and Rhythm: Normal rate and regular rhythm.      Pulses: Normal pulses.      Heart sounds: Normal heart sounds. No murmur heard.  Pulmonary:      Effort: Pulmonary effort is normal. No respiratory distress.      Breath sounds: Normal breath sounds.   Abdominal:      Palpations: Abdomen is soft.      Tenderness: There is no abdominal tenderness.   Skin:     General: Skin is warm and dry.   Neurological:      Mental Status: She is alert.         Assessment & Plan   Problems Addressed this Visit        Endocrine and Metabolic    Morbid obesity (HCC)    Relevant Orders    Comprehensive Metabolic Panel    TSH    T4, Free    C-Peptide       Mental Health    Anxiety, generalized    Relevant Medications    FLUoxetine (PROzac) 40 MG capsule    Other Relevant Orders    CBC (No Diff)    Comprehensive Metabolic Panel    TSH    T4, Free   Other Visit Diagnoses     Essential hypertension    -   Primary    Relevant Medications    triamterene-hydrochlorothiazide (MAXZIDE-25) 37.5-25 MG per tablet    Other Relevant Orders    Comprehensive Metabolic Panel    TSH    T4, Free    C-Peptide    Status post bariatric surgery        Relevant Orders    CBC (No Diff)    Comprehensive Metabolic Panel    Hemoglobin A1c    Lipid Panel    TSH    T4, Free    C-Peptide    Tonsillith          Diagnoses       Codes Comments    Essential hypertension    -  Primary ICD-10-CM: I10  ICD-9-CM: 401.9     Morbid obesity (HCC)     ICD-10-CM: E66.01  ICD-9-CM: 278.01     Status post bariatric surgery     ICD-10-CM: Z98.84  ICD-9-CM: V45.86     Anxiety, generalized     ICD-10-CM: F41.1  ICD-9-CM: 300.02     Tonsillith     ICD-10-CM: J35.8  ICD-9-CM: 474.8         I spent 30 minutes in patient care: Reviewing records prior to the visit, examining the patient, entering orders and documentation    Part of this note may be an electronic transcription/translation of spoken language to printed text using the Dragon Dictation System.         Transcribed from ambient dictation for FREDY Duggan MD by Shanique Recinos.  01/20/23   15:27 EST    Patient or patient representative verbalized consent to the visit recording.  I have personally performed the services described in this document as transcribed by the above individual, and it is both accurate and complete.       Statement Selected

## 2023-01-23 ENCOUNTER — LAB (OUTPATIENT)
Dept: LAB | Facility: HOSPITAL | Age: 53
End: 2023-01-23
Payer: COMMERCIAL

## 2023-01-23 DIAGNOSIS — F41.1 ANXIETY, GENERALIZED: ICD-10-CM

## 2023-01-23 DIAGNOSIS — I10 ESSENTIAL HYPERTENSION: ICD-10-CM

## 2023-01-23 DIAGNOSIS — Z98.84 STATUS POST BARIATRIC SURGERY: ICD-10-CM

## 2023-01-23 DIAGNOSIS — E66.01 MORBID OBESITY: ICD-10-CM

## 2023-01-23 LAB
ALBUMIN SERPL-MCNC: 4.2 G/DL (ref 3.5–5.2)
ALBUMIN/GLOB SERPL: 1.4 G/DL
ALP SERPL-CCNC: 70 U/L (ref 39–117)
ALT SERPL W P-5'-P-CCNC: 12 U/L (ref 1–33)
ANION GAP SERPL CALCULATED.3IONS-SCNC: 9 MMOL/L (ref 5–15)
AST SERPL-CCNC: 14 U/L (ref 1–32)
BILIRUB SERPL-MCNC: 0.5 MG/DL (ref 0–1.2)
BUN SERPL-MCNC: 10 MG/DL (ref 6–20)
BUN/CREAT SERPL: 15.4 (ref 7–25)
CALCIUM SPEC-SCNC: 9.1 MG/DL (ref 8.6–10.5)
CHLORIDE SERPL-SCNC: 105 MMOL/L (ref 98–107)
CHOLEST SERPL-MCNC: 201 MG/DL (ref 0–200)
CO2 SERPL-SCNC: 28 MMOL/L (ref 22–29)
CREAT SERPL-MCNC: 0.65 MG/DL (ref 0.57–1)
DEPRECATED RDW RBC AUTO: 42.5 FL (ref 37–54)
EGFRCR SERPLBLD CKD-EPI 2021: 106.7 ML/MIN/1.73
ERYTHROCYTE [DISTWIDTH] IN BLOOD BY AUTOMATED COUNT: 13 % (ref 12.3–15.4)
GLOBULIN UR ELPH-MCNC: 2.9 GM/DL
GLUCOSE SERPL-MCNC: 86 MG/DL (ref 65–99)
HBA1C MFR BLD: 5.5 % (ref 4.8–5.6)
HCT VFR BLD AUTO: 41.7 % (ref 34–46.6)
HDLC SERPL-MCNC: 49 MG/DL (ref 40–60)
HGB BLD-MCNC: 13.5 G/DL (ref 12–15.9)
LDLC SERPL CALC-MCNC: 131 MG/DL (ref 0–100)
LDLC/HDLC SERPL: 2.62 {RATIO}
MCH RBC QN AUTO: 28.7 PG (ref 26.6–33)
MCHC RBC AUTO-ENTMCNC: 32.4 G/DL (ref 31.5–35.7)
MCV RBC AUTO: 88.5 FL (ref 79–97)
PLATELET # BLD AUTO: 369 10*3/MM3 (ref 140–450)
PMV BLD AUTO: 10.4 FL (ref 6–12)
POTASSIUM SERPL-SCNC: 4.2 MMOL/L (ref 3.5–5.2)
PROT SERPL-MCNC: 7.1 G/DL (ref 6–8.5)
RBC # BLD AUTO: 4.71 10*6/MM3 (ref 3.77–5.28)
SODIUM SERPL-SCNC: 142 MMOL/L (ref 136–145)
T4 FREE SERPL-MCNC: 0.95 NG/DL (ref 0.93–1.7)
TRIGL SERPL-MCNC: 117 MG/DL (ref 0–150)
TSH SERPL DL<=0.05 MIU/L-ACNC: 1.96 UIU/ML (ref 0.27–4.2)
VLDLC SERPL-MCNC: 21 MG/DL (ref 5–40)
WBC NRBC COR # BLD: 7.23 10*3/MM3 (ref 3.4–10.8)

## 2023-01-23 PROCEDURE — 36415 COLL VENOUS BLD VENIPUNCTURE: CPT

## 2023-01-23 PROCEDURE — 84681 ASSAY OF C-PEPTIDE: CPT

## 2023-01-23 PROCEDURE — 84439 ASSAY OF FREE THYROXINE: CPT

## 2023-01-23 PROCEDURE — 83036 HEMOGLOBIN GLYCOSYLATED A1C: CPT

## 2023-01-23 PROCEDURE — 80061 LIPID PANEL: CPT

## 2023-01-23 PROCEDURE — 80050 GENERAL HEALTH PANEL: CPT

## 2023-01-24 LAB — C PEPTIDE SERPL-MCNC: 2.8 NG/ML (ref 1.1–4.4)

## 2023-02-28 ENCOUNTER — TELEPHONE (OUTPATIENT)
Dept: FAMILY MEDICINE CLINIC | Facility: CLINIC | Age: 53
End: 2023-02-28
Payer: COMMERCIAL

## 2023-02-28 NOTE — TELEPHONE ENCOUNTER
----- Message from Aniya PERDOMO sent at 2/28/2023  3:58 PM EST -----  Regarding: Weight Loss Medicine  Contact: [phone]  Hi Dr Pimentel,    I'm sad that the saxenda isn't a viable option right now so my question is, are there any other options for weight loss medicine that you would consider prescribing for me? I took phentermine for about a month awhile back and did not like the way it made my heart race but I would really appreciate it if there are any options that you can think of. I will be happy to make an appointment to come in to get the prescription if there is anything you would prescribe.     Thank you!  Aniya Perdomo

## 2023-04-11 ENCOUNTER — OFFICE VISIT (OUTPATIENT)
Dept: FAMILY MEDICINE CLINIC | Facility: CLINIC | Age: 53
End: 2023-04-11
Payer: COMMERCIAL

## 2023-04-11 VITALS
OXYGEN SATURATION: 99 % | DIASTOLIC BLOOD PRESSURE: 78 MMHG | SYSTOLIC BLOOD PRESSURE: 130 MMHG | BODY MASS INDEX: 34.73 KG/M2 | TEMPERATURE: 97 F | HEART RATE: 81 BPM | WEIGHT: 196 LBS | HEIGHT: 63 IN

## 2023-04-11 DIAGNOSIS — Z12.31 BREAST CANCER SCREENING BY MAMMOGRAM: ICD-10-CM

## 2023-04-11 DIAGNOSIS — Z12.11 COLON CANCER SCREENING: ICD-10-CM

## 2023-04-11 DIAGNOSIS — B02.9 HERPES ZOSTER WITHOUT COMPLICATION: Primary | ICD-10-CM

## 2023-04-11 PROCEDURE — 99213 OFFICE O/P EST LOW 20 MIN: CPT | Performed by: PHYSICIAN ASSISTANT

## 2023-04-11 RX ORDER — VALACYCLOVIR HYDROCHLORIDE 500 MG/1
1000 TABLET, FILM COATED ORAL 3 TIMES DAILY
Qty: 42 TABLET | Refills: 2 | Status: SHIPPED | OUTPATIENT
Start: 2023-04-11

## 2023-04-11 NOTE — PROGRESS NOTES
Subjective   Aniya PERDOMO is a 52 y.o. female  Herpes Zoster (Shingles on right buttock with burning sensation since 03/31, rash is not crusting )      History of Present Illness    The patient is seen today concerned with shingles diagnosis last month that is persistent.    The patient states she diagnosed herself with shingles. She has had shingles several times in the past, usually occur on her left buttock, and typically resolves within 4 to 5 days. Her current symptoms are present on her right buttock and began on 03/31/2023. She has not taken any medications for her symptoms. The patient states the rash scabbed over and was healing until yesterday 04/10/2023 when the symptoms began to worsen again. She states her symptoms have been more painful than her previous episodes and she complains of pain from her back and radiates into her groin and down her right lower extremity with associated intemittent itchiness and numbness. The patient has been attempting to sit of her left side to avoid pain.    The patient has not had a colonoscopy in the past. She denies family history of colon cancer or colon polyps. The patient notes she is due for a mammogram as well.    The following portions of the patient's history were reviewed and updated as appropriate: allergies, current medications, past social history and problem list    Review of Systems   Constitutional: Negative for fatigue and fever.   HENT: Negative for congestion, rhinorrhea and sore throat.    Eyes: Negative for pain.   Respiratory: Negative for cough and shortness of breath.    Gastrointestinal: Negative for diarrhea and vomiting.   Musculoskeletal: Positive for myalgias.   Skin: Positive for rash.       Objective     Vitals:    04/11/23 1202   BP: 130/78   Pulse: 81   Temp: 97 °F (36.1 °C)   SpO2: 99%       Physical Exam  Vitals and nursing note reviewed.   Constitutional:       General: She is not in acute distress.     Appearance: Normal  appearance. She is well-developed. She is not ill-appearing, toxic-appearing or diaphoretic.   Skin:     General: Skin is warm and dry.      Coloration: Skin is not pale.      Findings: Erythema and rash present.      Comments: Zoster rash right buttock sciatic region   Neurological:      Mental Status: She is alert.         Assessment & Plan     Diagnoses and all orders for this visit:    1. Herpes zoster without complication (Primary)    2. Colon cancer screening  -     Cologuard - Stool, Per Rectum; Future  -     Mammo Screening Digital Tomosynthesis Bilateral With CAD; Future    3. Breast cancer screening by mammogram    Other orders  -     valACYclovir (VALTREX) 500 MG tablet; Take 2 tablets by mouth 3 (Three) Times a Day.  Dispense: 42 tablet; Refill: 2       The patient will be prescribed Valtrex and was instructed to get her shingles vaccine in 2 to 3 months. She was advised to apply ice if itchiness persists and to avoid heat to the area. Cologuard and mammogram was ordered for the patient.      Answers for HPI/ROS submitted by the patient on 4/11/2023  What is the primary reason for your visit?: Rash    Transcribed from ambient dictation for Temi Rangel PA-C by Gardenia Freitas.  04/11/23   15:46 EDT    Patient or patient representative verbalized consent to the visit recording.  I have personally performed the services described in this document as transcribed by the above individual, and it is both accurate and complete.  Temi Rangel PA-C  4/12/2023  09:56 EDT

## 2023-04-17 ENCOUNTER — TELEPHONE (OUTPATIENT)
Dept: FAMILY MEDICINE CLINIC | Facility: CLINIC | Age: 53
End: 2023-04-17
Payer: COMMERCIAL

## 2023-04-17 DIAGNOSIS — B02.29 POSTHERPETIC NEURALGIA: Primary | ICD-10-CM

## 2023-04-17 RX ORDER — GABAPENTIN 100 MG/1
100 CAPSULE ORAL 3 TIMES DAILY PRN
Qty: 30 CAPSULE | Refills: 0 | Status: SHIPPED | OUTPATIENT
Start: 2023-04-17

## 2023-04-17 NOTE — TELEPHONE ENCOUNTER
I went ahead and sent in a prescription for gabapentin for patient's postherpetic neuralgia.  If not getting better with this medication we need to see her in the office.

## 2023-04-17 NOTE — TELEPHONE ENCOUNTER
----- Message from Aniya PERDOMO sent at 4/16/2023  7:43 PM EDT -----  Regarding: Shingles   Contact: [phone]  Hi, I saw you on Tuesday, April 11th for shingles which is on my sciatic nerve. You prescribed valacyclovir which I am currently still taking, I have not missed a dose. As we were finishing our visit on Tuesday, you said to let you know if the pain persists. I never thought I would actually need to follow up but unfortunately in the last 3 days, the pain has gotten to be at times excruciating in my groin and thigh area especially. I have spent the weekend using ice packs which helps for as long as I have ice on. Should I schedule another appointment? IS there anything that you can prescribe for pain or is there any suggestions as to what I should do from here? The blisters have pretty much dried up and scabbed over, by the way.    Thanks,  Aniya Perdomo

## 2023-04-17 NOTE — TELEPHONE ENCOUNTER
We could prescribe gabapentin but it is a controlled substance while need for her to come in to the office for an appointment for us to be able to write it

## 2023-05-18 ENCOUNTER — HOSPITAL ENCOUNTER (OUTPATIENT)
Dept: MAMMOGRAPHY | Facility: HOSPITAL | Age: 53
Discharge: HOME | End: 2023-05-18
Admitting: FAMILY MEDICINE
Payer: COMMERCIAL

## 2023-05-18 ENCOUNTER — OFFICE VISIT (OUTPATIENT)
Dept: BARIATRICS/WEIGHT MGMT | Facility: CLINIC | Age: 53
End: 2023-05-18
Payer: COMMERCIAL

## 2023-05-18 VITALS
DIASTOLIC BLOOD PRESSURE: 66 MMHG | OXYGEN SATURATION: 99 % | SYSTOLIC BLOOD PRESSURE: 126 MMHG | HEIGHT: 63 IN | WEIGHT: 187.8 LBS | HEART RATE: 85 BPM | BODY MASS INDEX: 33.27 KG/M2

## 2023-05-18 DIAGNOSIS — E66.9 OBESITY, CLASS I, BMI 30-34.9: Primary | ICD-10-CM

## 2023-05-18 DIAGNOSIS — R73.03 PREDIABETES: ICD-10-CM

## 2023-05-18 DIAGNOSIS — R53.83 FATIGUE, UNSPECIFIED TYPE: ICD-10-CM

## 2023-05-18 DIAGNOSIS — Z51.81 ENCOUNTER FOR THERAPEUTIC DRUG LEVEL MONITORING: ICD-10-CM

## 2023-05-18 DIAGNOSIS — F41.1 ANXIETY, GENERALIZED: ICD-10-CM

## 2023-05-18 DIAGNOSIS — Z79.899 ENCOUNTER FOR LONG-TERM (CURRENT) USE OF OTHER MEDICATIONS: ICD-10-CM

## 2023-05-18 DIAGNOSIS — G47.30 SLEEP APNEA WITH USE OF CONTINUOUS POSITIVE AIRWAY PRESSURE (CPAP): ICD-10-CM

## 2023-05-18 DIAGNOSIS — E78.2 MIXED HYPERLIPIDEMIA: ICD-10-CM

## 2023-05-18 DIAGNOSIS — Z12.11 COLON CANCER SCREENING: ICD-10-CM

## 2023-05-18 DIAGNOSIS — I10 PRIMARY HYPERTENSION: ICD-10-CM

## 2023-05-18 PROBLEM — E66.811 OBESITY, CLASS I, BMI 30-34.9: Status: ACTIVE | Noted: 2021-01-05

## 2023-05-18 PROCEDURE — 77067 SCR MAMMO BI INCL CAD: CPT

## 2023-05-18 PROCEDURE — 77063 BREAST TOMOSYNTHESIS BI: CPT

## 2023-05-18 RX ORDER — CHLORAL HYDRATE 500 MG
1000 CAPSULE ORAL 2 TIMES DAILY WITH MEALS
Start: 2023-05-18

## 2023-05-18 NOTE — ASSESSMENT & PLAN NOTE
Patient's (Body mass index is 33.8 kg/m².) indicates that they are obese (BMI >30) with health conditions that include obstructive sleep apnea, hypertension and dyslipidemias . Weight is unchanged. BMI  is above average; BMI management plan is completed. We discussed low calorie, low carb based diet program, portion control, increasing exercise, joining a fitness center or start home based exercise program, management of depression/anxiety/stress to control compensatory eating and an cleo-based approach such as Showcase Pal or Lose It.     Topics of discussion included obesity as a disease, nutritional education on food groups, exercise, and medications. Patient was instructed in adequate protein, controlled carb and controlled fat intake.   Patient received instructions on using the medicines as a tool in controlling their weight with nutritional and behavioral changes. Risks and benefits were discussed. I believe the potential benefits of medication helping to decrease weight outweighs the risks. Patient is to try nutritonal/behavioral changes only first.   Patient received our clinic education booklet.   Our patient consent form was reviewed including potential risks of weight loss. We also reviewed our confidentiality and HIPPA statements. Patients current FITT score was reviewed along with current capability for exercise tolerance and a patient will work towards a FITT score of:     Frequency   Intensity Time Strength Training   []   0 None  []   0 None  []   0 None  []   0 None    []   1 (1-2x/week) []   1 (light) []   1 (<10 min) []   1 (1x/week)   [x]   2 (3-5x/week) [x]   2 (moderate) []   2 (10-20 min) [x]   2 (2x/week)   []   3 (daily)   []   3 (moderately hard)  []   4 (very hard) []   3 (20-30 min)  [x]   4 (>30 min) []   3 (3-4x/week)       Patient's past medical history was reviewed in detail and barriers to weight loss were identified and discussed. Past efforts at weight reduction on their own as  well as under physician supervision were documented and discussed.  I advised patient to continue routine care with their Primary Care Provider.     Nutritional recommendations and goals were reviewed including Calories:900-1000 daily adjusted for exercise calories burnt, Protein: g daily, Net carbs (total carb - fiber) of 50-75g per day.  Start to keep a food journal and bring into next visit in 2 weeks for review. Practice the behavioral modification technique of mindful eating. Take one MVI daily and 2000mg fish oil daily. Take other medications and supplements as directed.  Increase exercise once school is completed.   Treatment goal 150-160lb.   AOM options: saxenda (loved sample previously), wegovy (shortage), phentermine low dose+topamax, wellbutrin/naltrexone, metformin.   Hx phentermine 1/2 tablet: heart racing, jittery.

## 2023-05-18 NOTE — PROGRESS NOTES
"  [facility]  [address]  [place] [address]   Office Note      Date: 2023  Patient Name: Aniya Lomeli  MRN: [id number]  : 1970  Subjective  Subjective     Chief Complaint  Obesity Management consult, nutrition counseling          Aniya Lomeli presents to [facility] for obesity management. She is a self referral although she had bariatric and metabolic surgery s/p 21 LSG/HHR with Dr. Rao., Presurgery weight: 226 pounds.Started her presurgery diet the day of her mother's , she is a stress eater. 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 Getting up at 4am and studying 2 hours before work then studying when she gets home while her  cooks dinner. Only has 3 weeks left then certification exam and she'll be finished. Has a new elliptical and weight machines. Her goal weight is 150lb. She wants to lose weight to live longer and to be healthy and to improve self image and energy.      Was prescribed saxenda by [facility] but never got to start it, prior authorizion was never completed. Tried the sample for 2 weeks and \"it was amazing\". Didn't think about eating, didn't eat when she was stressed and portions were more controlled.      Weight history:  Highest lifetime weight: 240 pounds. Today's weight is 85.2 kg (187 lb 12.8 oz)187.5 pounds.   Weight 5 years ago: 240    [facility] was very jittery, heart was racing with 1/2 tablet.      Current lifestyle:   The following seem to sabotage weight loss efforts:Lack of time for planning & self, comfort/stress eating, mindless eating (snacking while working or watching TV), eating too fast and specific cravings like carbohydrates.   Her lab is connected to the break room.   Stress eating happens any time of day  B: bowl of " "cereal (banana nut) with milk  S: donuts (someone brings to the lab)  L:  packs her lunch (sandwich or leftovers-  is not a good cook, he took over cooking when she started school) but she'll go to the cafeteria and get a flatbread pizza  D: veronica    Pertinent medical history:  Hypertension: stable on maxzide  Anxiety: has been on prozac for > 5 years. Went down to 1/2 dose about 5 years ago and didn't do well with it. Tried maybe zoloft and \"it turned me into a zombie\". Never took wellbutrin. PCP manages that. Has never seen a therapist.   Sleep apnea: wears CPAP night. Wakes up feeling well resting. Tries to sleep 8 hours a night.     Pertinent family history:    Review of Systems   Constitutional: Positive for unexpected weight gain. Negative for fatigue.        Positive for weight gain   HENT: Negative for trouble swallowing.         Negative for throat swelling   Respiratory: Negative for shortness of breath and wheezing.         Negative for snoring   Cardiovascular: Negative for chest pain, palpitations and leg swelling.   Gastrointestinal: Negative for abdominal pain, constipation, diarrhea, GERD and indigestion.   Endocrine: Negative for cold intolerance, heat intolerance, polydipsia, polyphagia and polyuria.        Negative for loss of hair  Negative for hirsutism     Genitourinary:        Denies menstrual irregularities   Musculoskeletal: Negative for arthralgias.        Denies exercise limitations  Denies chronic pain   Skin: Negative for dry skin.        Negative for acne   Neurological: Negative for headache and memory problem.        Negative for paresthesias   Psychiatric/Behavioral: Negative for self-injury, sleep disturbance, suicidal ideas and depressed mood. The patient is not nervous/anxious.        PHQ-9 Total Score:   0      Objective   Body mass index is 33.8 kg/m².  Body composition analysis completed and showed:   %body fat: 43.9    Measurements (in inches)  Neck: 15  Chest: " "44  Waist: 42  Hips: 43.5  Thighs: 31.5    Vital Signs:   /66   Pulse 85   Ht 158.8 cm (62.5\")   Wt 85.2 kg (187 lb 12.8 oz)   SpO2 99%   BMI 33.80 kg/m²     Physical Exam   General appears stated age and normal appearance   HEENT PERRLA, EOM intact, conjunctivae normal and without thyromegaly or thyroid nodules   Chest/lungs Normal rate, Regular rhythm, Breathing is unlabored and Clear to auscultation bilaterally   Abdomen Hypopigmented linear striae, Soft, normal bowel sounds, without mass or tenderness and Central adiposity   Extremities without edema and compression stockings   Neuro Good historian and No focal deficit   Skin Warm, dry, intact   Psych normal behavior, normal thought content and normal concentration     Result Review :   The following data was reviewed by: DEVIN Pal on 05/18/2023:  Common labs        6/29/2022    16:21 1/23/2023    07:50   Common Labs   Glucose  86     BUN  10     Creatinine  0.65     Sodium  142     Potassium  4.2     Chloride  105     Calcium  9.1     Albumin  4.2     Total Bilirubin  0.5     Alkaline Phosphatase  70     AST (SGOT)  14     ALT (SGPT)  12     WBC  7.23     Hemoglobin  13.5     Hematocrit  41.7     Platelets  369     Total Cholesterol  201     Triglycerides  117     HDL Cholesterol  49     LDL Cholesterol   131     Hemoglobin A1C 5.40   5.50       Data reviewed: Cardiology studies EKG : NSR           Assessment / Plan       Diagnoses and all orders for this visit:    1. Obesity, Class I, BMI 30-34.9 (Primary)  Assessment & Plan:  Patient's (Body mass index is 33.8 kg/m².) indicates that they are obese (BMI >30) with health conditions that include obstructive sleep apnea, hypertension and dyslipidemias . Weight is unchanged. BMI  is above average; BMI management plan is completed. We discussed low calorie, low carb based diet program, portion control, increasing exercise, joining a fitness center or start home based exercise program, " management of depression/anxiety/stress to control compensatory eating and an cleo-based approach such as Lio Social Pal or Lose It.     Topics of discussion included obesity as a disease, nutritional education on food groups, exercise, and medications. Patient was instructed in adequate protein, controlled carb and controlled fat intake.   Patient received instructions on using the medicines as a tool in controlling their weight with nutritional and behavioral changes. Risks and benefits were discussed. I believe the potential benefits of medication helping to decrease weight outweighs the risks. Patient is to try nutritonal/behavioral changes only first.   Patient received our clinic education booklet.   Our patient consent form was reviewed including potential risks of weight loss. We also reviewed our confidentiality and HIPPA statements. Patients current FITT score was reviewed along with current capability for exercise tolerance and a patient will work towards a FITT score of:     Frequency   Intensity Time Strength Training   []   0 None  []   0 None  []   0 None  []   0 None    []   1 (1-2x/week) []   1 (light) []   1 (<10 min) []   1 (1x/week)   [x]   2 (3-5x/week) [x]   2 (moderate) []   2 (10-20 min) [x]   2 (2x/week)   []   3 (daily)   []   3 (moderately hard)  []   4 (very hard) []   3 (20-30 min)  [x]   4 (>30 min) []   3 (3-4x/week)       Patient's past medical history was reviewed in detail and barriers to weight loss were identified and discussed. Past efforts at weight reduction on their own as well as under physician supervision were documented and discussed.  I advised patient to continue routine care with their Primary Care Provider.     Nutritional recommendations and goals were reviewed including Calories:900-1000 daily adjusted for exercise calories burnt, Protein: g daily, Net carbs (total carb - fiber) of 50-75g per day.  Start to keep a food journal and bring into next visit in 2 weeks  for review. Practice the behavioral modification technique of mindful eating. Take one MVI daily and 2000mg fish oil daily. Take other medications and supplements as directed.  Increase exercise once school is completed.   Treatment goal 150-160lb.   AOM options: saxenda (loved sample previously), wegovy (shortage), phentermine low dose+topamax, wellbutrin/naltrexone, metformin.   Hx phentermine 1/2 tablet: heart racing, jittery.         Orders:  -     Ambulatory Referral to Exercise Education  -     Omega-3 Fatty Acids (fish oil) 1000 MG capsule capsule; Take 1 capsule by mouth 2 (Two) Times a Day With Meals.    2. Primary hypertension  Assessment & Plan:  Hypertension is stable.  Continue current treatment regimen.  Weight loss.  Regular aerobic exercise.  Continue current medications.  Blood pressure will be reassessed at the next regular appointment.    Orders:  -     Ambulatory Referral to Exercise Education    3. Sleep apnea with use of continuous positive airway pressure (CPAP)  Assessment & Plan:  Asymptomatic with CPAP. Continue with weight reduction.       4. Mixed hyperlipidemia  Assessment & Plan:  Lipid abnormalities are improving with lifestyle modifications.  Nutritional counseling was provided.  Lipids will be reassessed in 1 year.  ASCVD risk <5%, statin not recommended.       5. Prediabetes  Assessment & Plan:  A1c improved from 6.2 prior to LSG to 5.2 now. Low carb diet, regular physical activity, and maintain weight reduction of 10-15%.         6. Anxiety, generalized  Assessment & Plan:  Psychological condition is improving with treatment.  Continue current treatment regimen.  Regular aerobic exercise.  Psychological condition  will be reassessed at the next regular appointment.  Discussed tips to decrease stress eating.       7. Fatigue, unspecified type  -     Ambulatory Referral to Exercise Education    8. Encounter for therapeutic drug level monitoring  -     Urine Drug Screen - Urine, Clean  Catch       I spent 60 minutes caring for Aniya on this date of service. This time includes time spent by me in the following activities:preparing for the visit, reviewing tests, performing a medically appropriate examination and/or evaluation , counseling and educating the patient/family/caregiver, ordering medications, tests, or procedures and documenting information in the medical record  Follow Up   Return in about 2 weeks (around 6/1/2023) for Next scheduled follow up.  Patient was given instructions and counseling regarding her condition or for health maintenance advice. Please see specific information pulled into the AVS if appropriate.     Annette Reeves, APRN  05/18/2023

## 2023-05-18 NOTE — ASSESSMENT & PLAN NOTE
A1c improved from 6.2 prior to LSG to 5.2 now. Low carb diet, regular physical activity, and maintain weight reduction of 10-15%.

## 2023-05-18 NOTE — ASSESSMENT & PLAN NOTE
Lipid abnormalities are improving with lifestyle modifications.  Nutritional counseling was provided.  Lipids will be reassessed in 1 year.  ASCVD risk <5%, statin not recommended.

## 2023-05-18 NOTE — ASSESSMENT & PLAN NOTE
Hypertension is stable.  Continue current treatment regimen.  Weight loss.  Regular aerobic exercise.  Continue current medications.  Blood pressure will be reassessed at the next regular appointment.

## 2023-05-18 NOTE — ASSESSMENT & PLAN NOTE
Psychological condition is improving with treatment.  Continue current treatment regimen.  Regular aerobic exercise.  Psychological condition  will be reassessed at the next regular appointment.  Discussed tips to decrease stress eating.

## 2023-05-20 LAB
AMPHETAMINES UR QL SCN: NEGATIVE NG/ML
BARBITURATES UR QL SCN: NEGATIVE NG/ML
BENZODIAZ UR QL SCN: NEGATIVE NG/ML
BZE UR QL SCN: NEGATIVE NG/ML
CANNABINOIDS UR QL SCN: NEGATIVE NG/ML
CREAT UR-MCNC: 218.5 MG/DL (ref 20–300)
LABORATORY COMMENT REPORT: NORMAL
METHADONE UR QL SCN: NEGATIVE NG/ML
OPIATES UR QL SCN: NEGATIVE NG/ML
OXYCODONE+OXYMORPHONE UR QL SCN: NEGATIVE NG/ML
PCP UR QL: NEGATIVE NG/ML
PH UR: 6 [PH] (ref 4.5–8.9)
PROPOXYPH UR QL SCN: NEGATIVE NG/ML

## 2023-05-22 RX ORDER — VALACYCLOVIR HYDROCHLORIDE 500 MG/1
500 TABLET, FILM COATED ORAL 3 TIMES DAILY
Qty: 30 TABLET | Refills: 2 | Status: SHIPPED | OUTPATIENT
Start: 2023-05-22

## 2023-05-26 ENCOUNTER — TELEMEDICINE (OUTPATIENT)
Dept: BARIATRICS/WEIGHT MGMT | Facility: CLINIC | Age: 53
End: 2023-05-26
Payer: COMMERCIAL

## 2023-05-26 VITALS — WEIGHT: 186 LBS | HEIGHT: 63 IN | BODY MASS INDEX: 32.96 KG/M2

## 2023-05-26 DIAGNOSIS — K91.2 POSTGASTRECTOMY MALABSORPTION: ICD-10-CM

## 2023-05-26 DIAGNOSIS — Z13.21 MALNUTRITION SCREEN: ICD-10-CM

## 2023-05-26 DIAGNOSIS — Z13.0 SCREENING FOR IRON DEFICIENCY ANEMIA: ICD-10-CM

## 2023-05-26 DIAGNOSIS — Z90.3 POSTGASTRECTOMY MALABSORPTION: ICD-10-CM

## 2023-05-26 DIAGNOSIS — R53.83 FATIGUE, UNSPECIFIED TYPE: ICD-10-CM

## 2023-05-26 DIAGNOSIS — E66.9 OBESITY, CLASS I, BMI 30-34.9: Primary | ICD-10-CM

## 2023-05-26 PROCEDURE — 99214 OFFICE O/P EST MOD 30 MIN: CPT | Performed by: PHYSICIAN ASSISTANT

## 2023-05-26 NOTE — PROGRESS NOTES
[facility]   [address]  [address]  [facility] [address] [phone]    This visit was conducted as a video visit, in an effort to limit spread of the novel coronavirus during the COVID-19 pandemic.  The patient gave consent.      Patient Name:  Aniya Lomeli  :  1970      Date of Visit: 2023      Reason for Visit:   2+ years postop      HPI: Aniya Lomeli is a 52 y.o. female s/p 21 LSG/HHR with Dr. Rao.    LOV 21 for 3 month postop visit. Was doing well with no issues.       Doing well.  Recently established care with medical weight loss last week and is likely planning to start Saxenda.  Her mother passed away 2 weeks prior to her gastric sleeve and she feels that she do was possibly not mentally ready to have surgery and for major lifestyle changes.  Her lowest weight after surgery was around 165 pounds and her goal weight is 150 pounds.  No issues/concerns. Denies dysphagia, reflux, nausea, vomiting, abdominal pain, hair loss, memory loss, vision changes and numbness/tingling.  Getting 60-80g prot/day. Drinking 64 fluid oz/day. No recent bariatric labs. Taking MVI, B12 and Biotin. Taking Protonix daily for PUD-follows with Dr. Campos.  Physical activity: No structured exercise.     Presurgery weight: 226 pounds.  Today's weight is 84.4 kg (186 lb) pounds, today's  Body mass index is 33.48 kg/m²., and weight loss since surgery is 40 pounds.      Past Medical History:   Diagnosis Date   • Anxiety, generalized    • Duodenal ulcer     dx during ERCP 3/2019, no bx, HPSA (-), continues on daily Protonix since   • Hiatal hernia with gastroesophageal reflux     controlled w/ daily Protonix, EGD 20 w/ GDW revealed small asymmetric HH   • HTN (hypertension)    • Hyperlipidemia     borderline, no meds   • Morbid obesity    • Organic mood disorder of depressed type    • Prediabetes     A1C 6.1   • Sleep apnea with use of continuous  positive airway pressure (CPAP)     compliant with machine    • Wears glasses      Past Surgical History:   Procedure Laterality Date   • ADENOIDECTOMY     • BARIATRIC SURGERY  2021   •  SECTION  1993   • CHOLECYSTECTOMY  2019   • CHOLECYSTECTOMY WITH INTRAOPERATIVE CHOLANGIOGRAM N/A 2019    Procedure: CHOLECYSTECTOMY LAPAROSCOPIC WITH IOC;  Surgeon: Wali Nicole MD;  Location:  [facility];  Service: General   • EAR TUBES     • ENDOSCOPY N/A 2021    Procedure: ESOPHAGOGASTRODUODENOSCOPY;  Surgeon: Bandar Rao MD;  Location:  [facility];  Service: Bariatric;  Laterality: N/A;   • ERCP N/A 2019    Procedure: ENDOSCOPIC RETROGRADE CHOLANGIOPANCREATOGRAPHY;  Surgeon: Todd Campos MD;  Location:  [facility];  Service: Gastroenterology   • GASTRIC SLEEVE LAPAROSCOPIC N/A 2021    Procedure: GASTRIC SLEEVE LAPAROSCOPIC;  Surgeon: Bandar Rao MD;  Location:  [facility];  Service: Bariatric;  Laterality: N/A;   • HIATAL HERNIA REPAIR N/A 2021    Procedure: HIATAL HERNIA REPAIR LAPAROSCOPIC;  Surgeon: Bandar Rao MD;  Location:  [facility];  Service: Bariatric;  Laterality: N/A;   • INTRAUTERINE DEVICE INSERTION      due for removal   • WISDOM TOOTH EXTRACTION       Outpatient Medications Marked as Taking for the 23 encounter (Telemedicine) with Guerda Rivera PA   Medication Sig Dispense Refill   • Biotin 2.5 MG tablet Take 2.5 mg by mouth Daily.     • FLUoxetine (PROzac) 40 MG capsule Take 1 capsule by mouth Daily. 90 capsule 3   • Multiple Vitamin (MULTI VITAMIN DAILY PO) Take 1 tablet by mouth Daily.     • Omega-3 Fatty Acids (fish oil) 1000 MG capsule capsule Take 1 capsule by mouth 2 (Two) Times a Day With Meals.     • triamterene-hydrochlorothiazide (MAXZIDE-25) 37.5-25 MG per tablet Take 1 tablet by mouth Daily. 90 tablet 3   • valACYclovir (Valtrex) 500 MG tablet Take 1 tablet by mouth 3 (Three) Times a Day. 30  "tablet 2       Allergies   Allergen Reactions   • Sulfa Antibiotics Hives       Social History     Socioeconomic History   • Marital status:    Tobacco Use   • Smoking status: Never   • Smokeless tobacco: Never   Vaping Use   • Vaping Use: Never used   Substance and Sexual Activity   • Alcohol use: Yes     Alcohol/week: 2.0 standard drinks     Types: 2 Drinks containing 0.5 oz of alcohol per week     Comment: Rarely, maybe once a month   • Drug use: No   • Sexual activity: Yes     Partners: Male     Birth control/protection: I.U.D.     Social History     Social History Narrative    Lives in [place], [place] but moving to [facility] w/ her boyfriend.  She has 3 children aged: 29, 27, and 26.  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         Ht 158.8 cm (62.5\")   Wt 84.4 kg (186 lb)   BMI 33.48 kg/m²     Physical Exam  Constitutional:       General: She is not in acute distress.  Pulmonary:      Effort: Pulmonary effort is normal.   Neurological:      Mental Status: She is alert and oriented to person, place, and time.   Psychiatric:         Mood and Affect: Mood normal.         Behavior: Behavior normal.         Thought Content: Thought content normal.         Judgment: Judgment normal.           Assessment:  2+ years s/p 1/28/21 LSG/HHR with Dr. Rao.    ICD-10-CM ICD-9-CM   1. Obesity, Class I, BMI 30-34.9  E66.9 278.00   2. Fatigue, unspecified type  R53.83 780.79   3. Screening for iron deficiency anemia  Z13.0 V78.0   4. Malnutrition screen  Z13.21 V77.2   5. Postgastrectomy malabsorption  K91.2 579.3    Z90.3        BMI is >= 30 and <35. (Class 1 Obesity). The following options were offered after discussion;: exercise counseling/recommendations and nutrition counseling/recommendations      Plan:  Doing well.  Discussed good food choices and healthy habits.  Emphasized the importance of prioritizing protein and making sure she is getting 3 meals a day with limited " snacking/grazing.  Continue follow-up with medical weight management.  Increase protein >70g/day.  Continue routine physical activity.  Routine bariatric labs ordered.  Continue vitamins w/ adjustments pending lab results.  Call w/ problems/concerns.     The patient was instructed to follow up in 1 year, sooner if needed.      I spent over 30 minutes caring for this patient on this date of service. This time includes time spent by me in the following activities: preparing for the visit, reviewing tests, performing a medically appropriate examination and/or evaluation, ordering medications, tests, or procedures and documenting information in the medical record.

## 2023-06-01 ENCOUNTER — OFFICE VISIT (OUTPATIENT)
Dept: BARIATRICS/WEIGHT MGMT | Facility: CLINIC | Age: 53
End: 2023-06-01

## 2023-06-01 VITALS
WEIGHT: 190 LBS | HEIGHT: 63 IN | OXYGEN SATURATION: 97 % | SYSTOLIC BLOOD PRESSURE: 124 MMHG | BODY MASS INDEX: 33.66 KG/M2 | DIASTOLIC BLOOD PRESSURE: 78 MMHG | HEART RATE: 81 BPM

## 2023-06-01 DIAGNOSIS — R73.03 PREDIABETES: ICD-10-CM

## 2023-06-01 DIAGNOSIS — E66.9 OBESITY, CLASS I, BMI 30-34.9: Primary | ICD-10-CM

## 2023-06-01 PROCEDURE — 99214 OFFICE O/P EST MOD 30 MIN: CPT | Performed by: NURSE PRACTITIONER

## 2023-06-01 RX ORDER — PEN NEEDLE, DIABETIC 30 GX3/16"
1 NEEDLE, DISPOSABLE MISCELLANEOUS DAILY
Qty: 100 EACH | Refills: 2 | Status: SHIPPED | OUTPATIENT
Start: 2023-06-01

## 2023-06-01 NOTE — PROGRESS NOTES
[facility]  [address]  [place] [address]     Office Note      Date: 2023  Patient Name: Aniya Lomeli  MRN: [id number]  : 1970  Subjective  Subjective     Chief Complaint  Obesity Management follow-up          Aniya Lomeli presents to [facility] for obesity management. s/p 21 LSG/HHR with Dr. Rao., Presurgery weight: 226 pounds.  Patient is unsure with weight loss progress. Appetite is moderately controlled. Reports no side effects of prescribed medications today. The patient is taking multivitamin and is taking fish oil.  The patient is using a food journal, working on cutting back on calories/snacking. Keeping around 1200 a day, carbs are >100g most days, protein <80g most days. She has finals next week then a certification exam on  then she will have less stress and more time. The patient is not exercising, plans to start an exercise routine at home in the next couple of weeks. Her  (who also had LSG) is going to start with medical weight management also and she knows that will help her as she is making lifestyle changes.      Review of Systems   Constitutional: Negative for appetite change and fatigue.   Eyes: Negative for blurred vision, double vision and visual disturbance.   Cardiovascular: Negative for chest pain and palpitations.   Gastrointestinal: Negative for abdominal pain, constipation, diarrhea, nausea, vomiting and GERD.   Endocrine: Negative for polydipsia, polyphagia and polyuria.   Musculoskeletal: Negative for arthralgias, back pain and myalgias.   Neurological: Negative for dizziness, tremors, light-headedness, headache and memory problem.        Parasthesias negative   Psychiatric/Behavioral: Negative for sleep disturbance, depressed mood and stress. The patient is not nervous/anxious.      Objective   Start weight: 188 pounds.    Total Loss lb/%Loss of beginning  "body weight (BBW): +2lb/+1.06%  Change in weight since last visit: +2    Body mass index is 34.2 kg/m².   Body composition analysis completed and showed:   %body fat: 42.4  Measurements (in inches)  Waist: 44    Vital Signs:   /78   Pulse 81   Ht 158.8 cm (62.5\")   Wt 86.2 kg (190 lb)   SpO2 97%   BMI 34.20 kg/m²     Physical Exam   General appears stated age and normal appearance   HEENT PERRLA, EOM intact and conjunctivae normal   Chest/lungs Normal rate, Regular rhythm and Breathing is unlabored   Extremities without edema   Neuro Good historian and No focal deficit   Skin Warm, dry, intact   Psych normal behavior, normal thought content and normal concentration     Result Review :                Assessment / Plan        Diagnoses and all orders for this visit:    1. Obesity, Class I, BMI 30-34.9 (Primary)  Assessment & Plan:  Patient's (Body mass index is 34.2 kg/m².) indicates that they are obese (BMI >30) with health conditions that include hypertension and dyslipidemias . Weight is unchanged. BMI  is above average; BMI management plan is completed. We discussed low calorie, low carb based diet program, portion control, increasing exercise, joining a fitness center or start home based exercise program, management of depression/anxiety/stress to control compensatory eating, pharmacologic options including saxenda and an cleo-based approach such as [place] Pal or Lose It.    I have instructed the patient to continue with pursuit of medical weight loss as a part of this program. Patient does meet criteria for use of anorectics at this time as BMI >30  and is not at treatment goal.     Continue nutritional focus and work towards new exercise FITT goal of: 2-2-4-2. Restart exercise program at home with .   The current plan for this month includes: adjust exercise as discussed and continue to work on lifestyle behavioral changes, continue daily food journal.    Start Saxenda. Denies family or " personal history of pancreatitis, MTC, or MEN 2. Discussed common side effects of nausea, diarrhea, vomiting, constipation, stomach pain, headache, fatigue, upset stomach, dizziness, feeling bloated, belching, gas, stomach flu, heartburn.          Orders:  -     Liraglutide (SAXENDA) 18 MG/3ML injection pen; Inject 3 mg under the skin into the appropriate area as directed Daily for 30 days.  Dispense: 15 mL; Refill: 0  -     Insulin Pen Needle (Pen Needles) 32G X 4 MM misc; Use 1 pen needle with Saxenda daily.  Dispense: 100 each; Refill: 2  -     Liraglutide (SAXENDA) 18 MG/3ML injection pen; Inject 0.6 mg under the skin into the appropriate area as directed Daily for 7 days, THEN 1.2 mg Daily for 7 days, THEN 1.8 mg Daily for 2 days.  Dispense: 3 mL; Refill: 0    2. Prediabetes  Assessment & Plan:  Low carb diet, regular physical activity, and weight reduction of 10-15%. Start saxenda.           I spent 30 minutes caring for Aniya on this date of service. This time includes time spent by me in the following activities:preparing for the visit, reviewing tests, performing a medically appropriate examination and/or evaluation , counseling and educating the patient/family/caregiver, ordering medications, tests, or procedures and documenting information in the medical record  Follow Up   Return in about 6 weeks (around 7/13/2023) for Next scheduled follow up.  Patient was given instructions and counseling regarding her condition or for health maintenance advice. Please see specific information pulled into the AVS if appropriate.     Annette Reeves, DEVIN  06/01/2023

## 2023-06-01 NOTE — ASSESSMENT & PLAN NOTE
Patient's (Body mass index is 34.2 kg/m².) indicates that they are obese (BMI >30) with health conditions that include hypertension and dyslipidemias . Weight is unchanged. BMI  is above average; BMI management plan is completed. We discussed low calorie, low carb based diet program, portion control, increasing exercise, joining a fitness center or start home based exercise program, management of depression/anxiety/stress to control compensatory eating, pharmacologic options including saxenda and an cleo-based approach such as K[employer] Pal or Lose It.    I have instructed the patient to continue with pursuit of medical weight loss as a part of this program. Patient does meet criteria for use of anorectics at this time as BMI >30  and is not at treatment goal.     Continue nutritional focus and work towards new exercise FITT goal of: 2-2-4-2. Restart exercise program at home with .   The current plan for this month includes: adjust exercise as discussed and continue to work on lifestyle behavioral changes, continue daily food journal.    Start Saxenda. Denies family or personal history of pancreatitis, MTC, or MEN 2. Discussed common side effects of nausea, diarrhea, vomiting, constipation, stomach pain, headache, fatigue, upset stomach, dizziness, feeling bloated, belching, gas, stomach flu, heartburn.

## 2023-06-02 ENCOUNTER — LAB (OUTPATIENT)
Dept: LAB | Facility: HOSPITAL | Age: 53
End: 2023-06-02
Payer: COMMERCIAL

## 2023-06-02 DIAGNOSIS — Z13.21 MALNUTRITION SCREEN: ICD-10-CM

## 2023-06-02 DIAGNOSIS — Z90.3 POSTGASTRECTOMY MALABSORPTION: ICD-10-CM

## 2023-06-02 DIAGNOSIS — K91.2 POSTGASTRECTOMY MALABSORPTION: ICD-10-CM

## 2023-06-02 DIAGNOSIS — R53.83 FATIGUE, UNSPECIFIED TYPE: ICD-10-CM

## 2023-06-02 DIAGNOSIS — Z13.0 SCREENING FOR IRON DEFICIENCY ANEMIA: ICD-10-CM

## 2023-06-02 DIAGNOSIS — E66.9 OBESITY, CLASS I, BMI 30-34.9: ICD-10-CM

## 2023-06-02 LAB
25(OH)D3 SERPL-MCNC: 47.3 NG/ML (ref 30–100)
ALBUMIN SERPL-MCNC: 4.2 G/DL (ref 3.5–5.2)
ALBUMIN/GLOB SERPL: 1.7 G/DL
ALP SERPL-CCNC: 71 U/L (ref 39–117)
ALT SERPL W P-5'-P-CCNC: 15 U/L (ref 1–33)
ANION GAP SERPL CALCULATED.3IONS-SCNC: 10 MMOL/L (ref 5–15)
AST SERPL-CCNC: 14 U/L (ref 1–32)
BASOPHILS # BLD AUTO: 0.07 10*3/MM3 (ref 0–0.2)
BASOPHILS NFR BLD AUTO: 0.7 % (ref 0–1.5)
BILIRUB SERPL-MCNC: 0.4 MG/DL (ref 0–1.2)
BUN SERPL-MCNC: 14 MG/DL (ref 6–20)
BUN/CREAT SERPL: 22.6 (ref 7–25)
CALCIUM SPEC-SCNC: 9.1 MG/DL (ref 8.6–10.5)
CHLORIDE SERPL-SCNC: 107 MMOL/L (ref 98–107)
CO2 SERPL-SCNC: 27 MMOL/L (ref 22–29)
CREAT SERPL-MCNC: 0.62 MG/DL (ref 0.57–1)
DEPRECATED RDW RBC AUTO: 44.5 FL (ref 37–54)
EGFRCR SERPLBLD CKD-EPI 2021: 108 ML/MIN/1.73
EOSINOPHIL # BLD AUTO: 0.35 10*3/MM3 (ref 0–0.4)
EOSINOPHIL NFR BLD AUTO: 3.5 % (ref 0.3–6.2)
ERYTHROCYTE [DISTWIDTH] IN BLOOD BY AUTOMATED COUNT: 13.6 % (ref 12.3–15.4)
FERRITIN SERPL-MCNC: 393.6 NG/ML (ref 13–150)
FOLATE SERPL-MCNC: 18.7 NG/ML (ref 4.78–24.2)
GLOBULIN UR ELPH-MCNC: 2.5 GM/DL
GLUCOSE SERPL-MCNC: 86 MG/DL (ref 65–99)
HCT VFR BLD AUTO: 40.3 % (ref 34–46.6)
HGB BLD-MCNC: 13.5 G/DL (ref 12–15.9)
IMM GRANULOCYTES # BLD AUTO: 0.04 10*3/MM3 (ref 0–0.05)
IMM GRANULOCYTES NFR BLD AUTO: 0.4 % (ref 0–0.5)
IRON 24H UR-MRATE: 84 MCG/DL (ref 37–145)
LYMPHOCYTES # BLD AUTO: 2.54 10*3/MM3 (ref 0.7–3.1)
LYMPHOCYTES NFR BLD AUTO: 25.1 % (ref 19.6–45.3)
MCH RBC QN AUTO: 30 PG (ref 26.6–33)
MCHC RBC AUTO-ENTMCNC: 33.5 G/DL (ref 31.5–35.7)
MCV RBC AUTO: 89.6 FL (ref 79–97)
MONOCYTES # BLD AUTO: 0.78 10*3/MM3 (ref 0.1–0.9)
MONOCYTES NFR BLD AUTO: 7.7 % (ref 5–12)
NEUTROPHILS NFR BLD AUTO: 6.33 10*3/MM3 (ref 1.7–7)
NEUTROPHILS NFR BLD AUTO: 62.6 % (ref 42.7–76)
PLATELET # BLD AUTO: 311 10*3/MM3 (ref 140–450)
PMV BLD AUTO: 10.7 FL (ref 6–12)
POTASSIUM SERPL-SCNC: 3.7 MMOL/L (ref 3.5–5.2)
PREALB SERPL-MCNC: 22.3 MG/DL (ref 20–40)
PROT SERPL-MCNC: 6.7 G/DL (ref 6–8.5)
RBC # BLD AUTO: 4.5 10*6/MM3 (ref 3.77–5.28)
SODIUM SERPL-SCNC: 144 MMOL/L (ref 136–145)
WBC NRBC COR # BLD: 10.11 10*3/MM3 (ref 3.4–10.8)

## 2023-06-02 PROCEDURE — 80053 COMPREHEN METABOLIC PANEL: CPT

## 2023-06-02 PROCEDURE — 85025 COMPLETE CBC W/AUTO DIFF WBC: CPT

## 2023-06-02 PROCEDURE — 84134 ASSAY OF PREALBUMIN: CPT

## 2023-06-02 PROCEDURE — 82306 VITAMIN D 25 HYDROXY: CPT

## 2023-06-02 PROCEDURE — 82746 ASSAY OF FOLIC ACID SERUM: CPT

## 2023-06-02 PROCEDURE — 84425 ASSAY OF VITAMIN B-1: CPT

## 2023-06-02 PROCEDURE — 83921 ORGANIC ACID SINGLE QUANT: CPT

## 2023-06-02 PROCEDURE — 36415 COLL VENOUS BLD VENIPUNCTURE: CPT

## 2023-06-02 PROCEDURE — 82728 ASSAY OF FERRITIN: CPT

## 2023-06-02 PROCEDURE — 83540 ASSAY OF IRON: CPT

## 2023-06-06 LAB — VIT B1 BLD-SCNC: 186.6 NMOL/L (ref 66.5–200)

## 2023-06-07 LAB — METHYLMALONATE SERPL-SCNC: 229 NMOL/L (ref 0–378)

## 2023-06-08 ENCOUNTER — PRIOR AUTHORIZATION (OUTPATIENT)
Dept: BARIATRICS/WEIGHT MGMT | Facility: CLINIC | Age: 53
End: 2023-06-08
Payer: COMMERCIAL

## 2023-06-09 ENCOUNTER — PRIOR AUTHORIZATION (OUTPATIENT)
Dept: BARIATRICS/WEIGHT MGMT | Facility: CLINIC | Age: 53
End: 2023-06-09
Payer: COMMERCIAL

## 2023-06-27 ENCOUNTER — TELEPHONE (OUTPATIENT)
Dept: FAMILY MEDICINE CLINIC | Facility: CLINIC | Age: 53
End: 2023-06-27

## 2023-06-27 NOTE — TELEPHONE ENCOUNTER
Caller: Aniya Lomeli    Relationship: Self    Best call back number: [phone]    What is the best time to reach you: ANY TIME    Who are you requesting to speak with (clinical staff, provider,  specific staff member): NURSE    Do you know the name of the person who called: SELF    What was the call regarding: PATIENT CALLED CHECKING THE STATUS OF FMLA PAPERWORK. PLEASE ADVISE

## 2023-07-24 ENCOUNTER — TELEPHONE (OUTPATIENT)
Dept: FAMILY MEDICINE CLINIC | Facility: CLINIC | Age: 53
End: 2023-07-24
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
## 2023-07-26 ENCOUNTER — TELEMEDICINE (OUTPATIENT)
Dept: BARIATRICS/WEIGHT MGMT | Facility: CLINIC | Age: 53
End: 2023-07-26
Payer: COMMERCIAL

## 2023-07-26 VITALS — WEIGHT: 176 LBS | BODY MASS INDEX: 31.18 KG/M2 | HEIGHT: 63 IN

## 2023-07-26 DIAGNOSIS — R73.03 PREDIABETES: ICD-10-CM

## 2023-07-26 DIAGNOSIS — E66.9 OBESITY, CLASS I, BMI 30-34.9: Primary | ICD-10-CM

## 2023-07-26 PROCEDURE — 99214 OFFICE O/P EST MOD 30 MIN: CPT | Performed by: NURSE PRACTITIONER

## 2023-07-26 RX ORDER — LIRAGLUTIDE 6 MG/ML
3 INJECTION, SOLUTION SUBCUTANEOUS DAILY
Qty: 15 ML | Refills: 2 | Status: SHIPPED | OUTPATIENT
Start: 2023-07-26

## 2023-07-26 NOTE — ASSESSMENT & PLAN NOTE
Patient's (Body mass index is 31.68 kg/m².) indicates that they are obese (BMI >30) with health conditions that include obstructive sleep apnea, hypertension, dyslipidemias, and prediabetes  . Weight is improving with treatment. BMI  is above average; BMI management plan is completed. We discussed low calorie, low carb based diet program, portion control, increasing exercise, management of depression/anxiety/stress to control compensatory eating, pharmacologic options including saxenda, and an cleo-based approach such as [employer]ub Pal or Lose It.     I have instructed the patient to continue with pursuit of medical weight loss as a part of this program. Patient does meet criteria for use of anorectics at this time as BMI >30  and is not at treatment goal.     Continue nutritional focus and work towards new exercise FITT goal of: 2-2-4-2.  The current plan for this month includes: continue to work on lifestyle behavioral changes, continue to work on mindfulness to decrease stress eating. Continue saxenda.   Treatment goal 150-160lb.

## 2023-07-26 NOTE — ASSESSMENT & PLAN NOTE
Stable, denies hypoglycemia.  Continue low carb diet, regular physical activity, and weight reduction of 10-15%.  Continue Saxenda.

## 2023-08-18 ENCOUNTER — OFFICE VISIT (OUTPATIENT)
Dept: FAMILY MEDICINE CLINIC | Facility: CLINIC | Age: 53
End: 2023-08-18
Payer: COMMERCIAL

## 2023-08-18 VITALS
TEMPERATURE: 98 F | HEART RATE: 83 BPM | RESPIRATION RATE: 15 BRPM | BODY MASS INDEX: 31.54 KG/M2 | OXYGEN SATURATION: 98 % | HEIGHT: 63 IN | SYSTOLIC BLOOD PRESSURE: 140 MMHG | DIASTOLIC BLOOD PRESSURE: 80 MMHG | WEIGHT: 178 LBS

## 2023-08-18 DIAGNOSIS — S32.009D CLOSED FRACTURE OF TRANSVERSE PROCESS OF LUMBAR VERTEBRA WITH ROUTINE HEALING, SUBSEQUENT ENCOUNTER: Primary | ICD-10-CM

## 2023-08-18 DIAGNOSIS — S22.31XD CLOSED FRACTURE OF ONE RIB OF RIGHT SIDE WITH ROUTINE HEALING, SUBSEQUENT ENCOUNTER: ICD-10-CM
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
## 2023-09-05 ENCOUNTER — OFFICE VISIT (OUTPATIENT)
Dept: BARIATRICS/WEIGHT MGMT | Facility: CLINIC | Age: 53
End: 2023-09-05
Payer: COMMERCIAL

## 2023-09-05 VITALS
DIASTOLIC BLOOD PRESSURE: 68 MMHG | HEIGHT: 63 IN | WEIGHT: 170.6 LBS | SYSTOLIC BLOOD PRESSURE: 114 MMHG | BODY MASS INDEX: 30.23 KG/M2 | OXYGEN SATURATION: 99 % | HEART RATE: 86 BPM | RESPIRATION RATE: 16 BRPM

## 2023-09-05 DIAGNOSIS — E66.9 OBESITY, CLASS I, BMI 30-34.9: Primary | ICD-10-CM

## 2023-09-05 DIAGNOSIS — R73.03 PREDIABETES: ICD-10-CM

## 2023-09-05 PROCEDURE — 99213 OFFICE O/P EST LOW 20 MIN: CPT | Performed by: NURSE PRACTITIONER

## 2023-09-05 NOTE — ASSESSMENT & PLAN NOTE
Patient's (Body mass index is 30.71 kg/m².) indicates that they are obese (BMI >30) with health conditions that include obstructive sleep apnea, hypertension, dyslipidemias, and prediabetes  . Weight is improving with treatment. BMI  is above average; BMI management plan is completed. We discussed low calorie, low carb based diet program, portion control, increasing exercise, management of depression/anxiety/stress to control compensatory eating, pharmacologic options including saxenda, and an cleo-based approach such as 7fgame Pal or Lose It.     I have instructed the patient to continue with pursuit of medical weight loss as a part of this program. Patient does meet criteria for use of anorectics at this time as BMI >30 , is not at treatment goal, and this medication is indicated for LONG TERM use for management of obesity.     Continue nutritional focus and work towards new exercise FITT goal of: 2-2-4-2 as tolerated with back pain.  The current plan for this month includes:   - Continue to work on lifestyle behavioral changes  - Continue nutrition focus  - Treatment goal 150-160lb.

## 2023-09-05 NOTE — ASSESSMENT & PLAN NOTE
Denies hypoglycemia. Continue low carb diet, regular physical activity, and weight reduction of 10-15%. Continue saxenda.

## 2023-09-06 RX ORDER — AMOXICILLIN AND CLAVULANATE POTASSIUM 875; 125 MG/1; MG/1
1 TABLET, FILM COATED ORAL 2 TIMES DAILY
Qty: 20 TABLET | Refills: 0 | Status: SHIPPED | OUTPATIENT
Start: 2023-09-06 | End: 2023-09-16

## 2023-09-11 ENCOUNTER — PATIENT MESSAGE (OUTPATIENT)
Dept: BARIATRICS/WEIGHT MGMT | Facility: CLINIC | Age: 53
End: 2023-09-11
Payer: COMMERCIAL

## 2023-09-11 DIAGNOSIS — E66.9 OBESITY, CLASS I, BMI 30-34.9: Primary | ICD-10-CM

## 2023-09-12 ENCOUNTER — PRIOR AUTHORIZATION (OUTPATIENT)
Dept: BARIATRICS/WEIGHT MGMT | Facility: CLINIC | Age: 53
End: 2023-09-12
Payer: COMMERCIAL

## 2023-09-12 RX ORDER — SEMAGLUTIDE 1.7 MG/.75ML
1.7 INJECTION, SOLUTION SUBCUTANEOUS WEEKLY
Qty: 3 ML | Refills: 2 | Status: SHIPPED | OUTPATIENT
Start: 2023-09-12

## 2023-09-12 NOTE — TELEPHONE ENCOUNTER
From: Aniya Lomeli  To: Annette Reeves  Sent: 9/11/2023 12:36 PM EDT  Subject: Saxenda    Good afternoon,  I have not been able to get my saxenda refilled although I put in the request at the pharmacy 6 days ago. They are saying that they have not gotten a shipment in although they order it every day. They say they have no idea when it will be filled.     My  sees your coworker, Josie (I believe?) and he was on Saxenda as well. She switched him to wegovy as she said that there is only a 5% chance of getting a saxenda prescription refilled currently. Although I am very happy on the saxenda, if this is true I would very much like to switch to wegovy as well, at least until the Saxenda is back in stock or the generic version is released. Is that possible?    Thanks,  Aniya Lomeli

## 2023-10-16 ENCOUNTER — TELEMEDICINE (OUTPATIENT)
Dept: BARIATRICS/WEIGHT MGMT | Facility: CLINIC | Age: 53
End: 2023-10-16
Payer: COMMERCIAL

## 2023-10-16 VITALS
SYSTOLIC BLOOD PRESSURE: 114 MMHG | DIASTOLIC BLOOD PRESSURE: 78 MMHG | HEIGHT: 63 IN | WEIGHT: 164 LBS | HEART RATE: 83 BPM | BODY MASS INDEX: 29.06 KG/M2

## 2023-10-16 DIAGNOSIS — E66.3 OVERWEIGHT WITH BODY MASS INDEX (BMI) OF 29 TO 29.9 IN ADULT: Primary | ICD-10-CM

## 2023-10-16 DIAGNOSIS — K59.09 OTHER CONSTIPATION: ICD-10-CM

## 2023-10-16 PROCEDURE — 99214 OFFICE O/P EST MOD 30 MIN: CPT | Performed by: NURSE PRACTITIONER

## 2023-10-16 RX ORDER — SEMAGLUTIDE 2.4 MG/.75ML
2.4 INJECTION, SOLUTION SUBCUTANEOUS WEEKLY
Qty: 3 ML | Refills: 2 | Status: SHIPPED | OUTPATIENT
Start: 2023-10-16

## 2023-10-16 NOTE — ASSESSMENT & PLAN NOTE
Chronic. Slightly worse with wegovy. Continue stool softener, ok to continue chocolate milk once a week. Recommend low sugar. Continue to be mindful of bowel habits and reach out to me if constipation is worsening.    room air

## 2023-10-16 NOTE — ASSESSMENT & PLAN NOTE
Patient's (Body mass index is 29.52 kg/m².) indicates that they are overweight with health conditions that include obstructive sleep apnea, hypertension, dyslipidemias, GERD, and pre-diabetes  . Weight is improving with treatment. BMI is is above average; BMI management plan is completed. We discussed low calorie, low carb based diet program, portion control, increasing exercise, and pharmacologic options including wegovy .    I have instructed the patient to continue with pursuit of medical weight loss as a part of this program. Patient does meet criteria for use of anorectics at this time as BMI > 27 with coexisting, hyperlipidemia, hypertension, impaired fasting glucose, and is not at treatment goal.     The current plan for this month includes:   - Adjust exercise as discussed- advised to add resistance with elliptical. Plans to start on her new bike.   - Continue to prioritize protein, fiber, and hydration.   - Complete wegovy 1.7mg (just picked up her second box) then start 2.4mg next visit.   - Almost to treatment goal of 150-160.

## 2023-10-16 NOTE — PROGRESS NOTES
Office Note      Date: 10/16/2023  Patient Name: Aniya Lomeli  MRN: [id number]  : 1970    This service was conducted via [employer].  Patient is located at her home address.  Provider is located at her work address. The use of a video visit has been reviewed with the patient and informed consent has been obtained.  Subjective  Subjective     Chief Complaint  Obesity Management follow-up    Subjective          Aniya Lomeli presents to [facility] via telehealth for obesity management. s/p 21 LSG/HHR with Dr. Rao.    Patient is satisfied with weight loss progress. Appetite is well controlled. Changed from saxenda 3mg to wegovy 1.7mg since last visit due to shortage of saxenda. Feels a little nausea the morning after her wegovy dose. Probiotics and 1% [employer] brand chocolate milk (once a week) help with constipation.  Likes doing one shot a week with wegovy vs once a day with saxenda. It controls her appetite all week VS the saxenda that wore off after 10-12 hours. She is keeping a food journal. She is keeping her calories under 1000. The food journal helps her when deciding if she can/should have something else to eat later in the day.   The patient is exercising on the elliptical 5 days a week for 20 minutes. It is starting to get easier. Her  got her a bicycle that she  hasn't tried yet, it came in the mail last week and she's been to exhausted to try it thus far. She hasn't ridden a bike in 30 years so she is a little nervous about it.    Review of Systems   Constitutional:  Negative for appetite change and fatigue.   Eyes:  Negative for blurred vision, double vision and visual disturbance.   Cardiovascular:  Negative for chest pain and palpitations.   Gastrointestinal:  Positive for constipation and nausea. Negative for abdominal pain, diarrhea, vomiting and GERD.   Endocrine: Negative for polydipsia, polyphagia and polyuria.   Musculoskeletal:  " Negative for arthralgias, back pain and myalgias.   Neurological:  Negative for dizziness, tremors, light-headedness, headache and memory problem.        Parasthesias negative   Psychiatric/Behavioral:  Negative for sleep disturbance, depressed mood and stress. The patient is not nervous/anxious.          Objective   Start weight: 187 pounds.    Total weight loss: -23 pounds/-12%  Change in weight since last visit: -6lb  Weight change since 01/2023:      Body mass index is 29.52 kg/m².   Measurements (in inches)     /78   Pulse 83   Ht 158.8 cm (62.5\")   Wt 74.4 kg (164 lb)   BMI 29.52 kg/m²     38\"    Result Review :                 Assessment and Plan    Diagnoses and all orders for this visit:    1. Overweight with body mass index (BMI) of 29 to 29.9 in adult (Primary)  Assessment & Plan:  Patient's (Body mass index is 29.52 kg/m².) indicates that they are overweight with health conditions that include obstructive sleep apnea, hypertension, dyslipidemias, GERD, and pre-diabetes  . Weight is improving with treatment. BMI is is above average; BMI management plan is completed. We discussed low calorie, low carb based diet program, portion control, increasing exercise, and pharmacologic options including wegovy .    I have instructed the patient to continue with pursuit of medical weight loss as a part of this program. Patient does meet criteria for use of anorectics at this time as BMI > 27 with coexisting, hyperlipidemia, hypertension, impaired fasting glucose, and is not at treatment goal.     The current plan for this month includes:   - Adjust exercise as discussed- advised to add resistance with elliptical. Plans to start on her new bike.   - Continue to prioritize protein, fiber, and hydration.   - Complete wegovy 1.7mg (just picked up her second box) then start 2.4mg next visit.   - Almost to treatment goal of 150-160.       Orders:  -     Semaglutide-Weight Management (Wegovy) 2.4 MG/0.75ML " solution auto-injector; Inject 2.4 mg under the skin into the appropriate area as directed 1 (One) Time Per Week.  Dispense: 3 mL; Refill: 2    2. Other constipation  Assessment & Plan:  Chronic. Slightly worse with wegovy. Continue stool softener, ok to continue chocolate milk once a week. Recommend low sugar. Continue to be mindful of bowel habits and reach out to me if constipation is worsening.             Follow Up   Return in about 1 month (around 11/16/2023) for Next scheduled follow up.  Patient was given instructions and counseling regarding her condition or for health maintenance advice. Please see specific information pulled into the AVS if appropriate.     DEVIN Shultz

## 2023-11-20 ENCOUNTER — TELEMEDICINE (OUTPATIENT)
Dept: BARIATRICS/WEIGHT MGMT | Facility: CLINIC | Age: 53
End: 2023-11-20
Payer: COMMERCIAL

## 2023-11-20 VITALS
DIASTOLIC BLOOD PRESSURE: 81 MMHG | SYSTOLIC BLOOD PRESSURE: 111 MMHG | WEIGHT: 154.2 LBS | HEIGHT: 63 IN | BODY MASS INDEX: 27.32 KG/M2

## 2023-11-20 DIAGNOSIS — R73.03 PREDIABETES: ICD-10-CM

## 2023-11-20 DIAGNOSIS — E66.3 OVERWEIGHT WITH BODY MASS INDEX (BMI) OF 27 TO 27.9 IN ADULT: Primary | ICD-10-CM

## 2023-11-20 PROCEDURE — 99214 OFFICE O/P EST MOD 30 MIN: CPT | Performed by: NURSE PRACTITIONER

## 2023-11-20 NOTE — ASSESSMENT & PLAN NOTE
Patient's (Body mass index is 27.75 kg/m².) indicates that they are overweight with health conditions that include obstructive sleep apnea, hypertension, dyslipidemias, and GERD . Weight is improving with treatment. BMI is is above average; BMI management plan is completed. We discussed low calorie, low carb based diet program, portion control, increasing exercise, joining a fitness center or start home based exercise program, pharmacologic options including wegovy, and an cleo-based approach such as [employer] Photoelectricity Pal or Lose It.     I have instructed the patient to continue with pursuit of medical weight loss as a part of this program. Patient does meet criteria for use of anorectics at this time as BMI > 27 with coexisting, hyperlipidemia, hypertension, impaired fasting glucose, and this medication is indicated for LONG TERM use for management of obesity.     The current plan for this month includes:   - Continue to prioritize protein, fiber, and hydration.   - Continue food journal and current macronutrient goals.   - Bring back exercise 5 days a week.   - At treatment goal, will establish new goal at next visit after review of body composition analysis.   - Lower limit of 130lb established based on last body composition. If weight approaches 140lb we will decrease wegovy to 1.7mg for maintenance.

## 2023-11-20 NOTE — ASSESSMENT & PLAN NOTE
Denies hypoglycemia. Continue low carb diet, regular physical activity, and weight reduction of 10-15%. Continue Wegovy.

## 2023-11-20 NOTE — PROGRESS NOTES
"     Office Note      Date: 2023  Patient Name: Aniya Lomeli  MRN: [id number]  : 1970    This service was conducted via [employer].  Patient is located at work.  Provider is located at her work address. The use of a video visit has been reviewed with the patient and informed consent has been obtained.  Subjective  Subjective     Chief Complaint  Obesity Management follow-up    Subjective          Aniya Lomeli presents to [facility] via telehealth for obesity management. s/p 21 LSG/HHR with Dr. Rao., Presurgery weight: 226 pounds.    Patient is satisfied with weight loss progress. Appetite is well controlled. Had first wegovy 2.4mg shot Thursday and had a little stomach cramping the next 2 days but no vomiting or bowel problems. Reports no other side effects of prescribed medications today. She is keeping a food journal: She's eating much less since starting wegovy. She is trying to get to 800 calories.  24 hour recall ():  B: protein waffles from [employer] (1.5) with syrup -250 calories  L:  6\" subway sandwich (subway club with meat, lettuce and tomato)  D: part of 1 piece of pizza 250-300 calories  The patient is exercising on the elliptical 3 days a week instead of her normal 5 days due to weekend obligations.Has increased the incline on her elliptical.     Review of Systems   Constitutional:  Positive for appetite change. Negative for fatigue.   Eyes:  Negative for blurred vision, double vision and visual disturbance.   Cardiovascular:  Negative for chest pain and palpitations.   Gastrointestinal:  Negative for abdominal pain, constipation, diarrhea, nausea, vomiting and GERD.   Endocrine: Negative for polydipsia, polyphagia and polyuria.   Musculoskeletal:  Negative for arthralgias, back pain and myalgias.   Neurological:  Negative for dizziness, tremors, light-headedness, headache and memory problem.        Parasthesias negative " "  Psychiatric/Behavioral:  Negative for sleep disturbance, depressed mood and stress. The patient is not nervous/anxious.          Objective   Start weight MWM: 187 pounds.    Total weight loss: -33 pounds/-17%  Change in weight since last visit: -16lb    Body mass index is 27.75 kg/m².   Measurements (in inches)     /81   Ht 158.8 cm (62.5\")   Wt 69.9 kg (154 lb 3.2 oz)   BMI 27.75 kg/m²       Result Review :                 Assessment and Plan    Diagnoses and all orders for this visit:    1. Overweight with body mass index (BMI) of 27 to 27.9 in adult (Primary)  Assessment & Plan:  Patient's (Body mass index is 27.75 kg/m².) indicates that they are overweight with health conditions that include obstructive sleep apnea, hypertension, dyslipidemias, and GERD . Weight is improving with treatment. BMI is is above average; BMI management plan is completed. We discussed low calorie, low carb based diet program, portion control, increasing exercise, joining a fitness center or start home based exercise program, pharmacologic options including wegovy, and an cleo-based approach such as Drip In Pal or Lose It.     I have instructed the patient to continue with pursuit of medical weight loss as a part of this program. Patient does meet criteria for use of anorectics at this time as BMI > 27 with coexisting, hyperlipidemia, hypertension, impaired fasting glucose, and this medication is indicated for LONG TERM use for management of obesity.     The current plan for this month includes:   - Continue to prioritize protein, fiber, and hydration.   - Continue food journal and current macronutrient goals.   - Bring back exercise 5 days a week.   - At treatment goal, will establish new goal at next visit after review of body composition analysis.   - Lower limit of 130lb established based on last body composition. If weight approaches 140lb we will decrease wegovy to 1.7mg for maintenance.           2. " Prediabetes  Assessment & Plan:  Denies hypoglycemia. Continue low carb diet, regular physical activity, and weight reduction of 10-15%. Continue Wegovy.               Follow Up   Return in about 6 weeks (around 1/1/2024) for Next scheduled follow up.  Patient was given instructions and counseling regarding her condition or for health maintenance advice. Please see specific information pulled into the AVS if appropriate.     DEVIN Shultz

## 2024-01-03 RX ORDER — TRIAMTERENE AND HYDROCHLOROTHIAZIDE 37.5; 25 MG/1; MG/1
1 TABLET ORAL DAILY
Qty: 90 TABLET | Refills: 3 | Status: SHIPPED | OUTPATIENT
Start: 2024-01-03

## 2024-01-03 RX ORDER — FLUOXETINE HYDROCHLORIDE 40 MG/1
40 CAPSULE ORAL DAILY
Qty: 90 CAPSULE | Refills: 3 | Status: SHIPPED | OUTPATIENT
Start: 2024-01-03

## 2024-01-29 ENCOUNTER — OFFICE VISIT (OUTPATIENT)
Dept: BARIATRICS/WEIGHT MGMT | Facility: CLINIC | Age: 54
End: 2024-01-29
Payer: COMMERCIAL

## 2024-01-29 VITALS
BODY MASS INDEX: 24.49 KG/M2 | SYSTOLIC BLOOD PRESSURE: 106 MMHG | RESPIRATION RATE: 16 BRPM | OXYGEN SATURATION: 99 % | WEIGHT: 138.2 LBS | HEART RATE: 72 BPM | HEIGHT: 63 IN | DIASTOLIC BLOOD PRESSURE: 68 MMHG

## 2024-01-29 DIAGNOSIS — K59.09 OTHER CONSTIPATION: ICD-10-CM

## 2024-01-29 DIAGNOSIS — Z13.21 MALNUTRITION SCREEN: ICD-10-CM

## 2024-01-29 DIAGNOSIS — Z71.3 WEIGHT LOSS COUNSELING, ENCOUNTER FOR: ICD-10-CM

## 2024-01-29 DIAGNOSIS — R73.03 PREDIABETES: ICD-10-CM

## 2024-01-29 DIAGNOSIS — I10 PRIMARY HYPERTENSION: ICD-10-CM

## 2024-01-29 DIAGNOSIS — R53.83 FATIGUE, UNSPECIFIED TYPE: ICD-10-CM

## 2024-01-29 DIAGNOSIS — R63.4 WEIGHT LOSS OF MORE THAN 10% BODY WEIGHT: ICD-10-CM

## 2024-01-29 DIAGNOSIS — E78.2 MIXED HYPERLIPIDEMIA: ICD-10-CM

## 2024-01-29 DIAGNOSIS — E66.8 OTHER OBESITY: Primary | ICD-10-CM

## 2024-01-29 PROBLEM — K59.03 DRUG-INDUCED CONSTIPATION: Status: ACTIVE | Noted: 2023-10-16

## 2024-01-29 PROBLEM — E66.89 OTHER OBESITY: Status: ACTIVE | Noted: 2021-01-05

## 2024-01-29 PROCEDURE — 99215 OFFICE O/P EST HI 40 MIN: CPT | Performed by: NURSE PRACTITIONER

## 2024-01-29 RX ORDER — SEMAGLUTIDE 1.7 MG/.75ML
1.7 INJECTION, SOLUTION SUBCUTANEOUS WEEKLY
Qty: 3 ML | Refills: 2 | Status: SHIPPED | OUTPATIENT
Start: 2024-01-29

## 2024-01-29 NOTE — ASSESSMENT & PLAN NOTE
Repeat labs this month. Denies hypoglycemia. Continue low carb diet, regular physical activity, and weight reduction of 10-15%. Continue wegovy.

## 2024-01-29 NOTE — ASSESSMENT & PLAN NOTE
Patient's (Body mass index is 24.87 kg/m².) is now normal with health conditions that include obstructive sleep apnea, hypertension, dyslipidemias, GERD, and prediabetes  .   %fat is now normal at 31.6%.  Weight is improving with treatment. We discussed pharmacologic options including wegovy . Side effects include: constipation.     I have instructed the patient to continue regular follow up for management of chronic obesity. Patient does meet criteria for use of anorectics at this time as BMI > 27 with coexisting and this medication is indicated for LONG TERM use for management of obesity.     The current plan for this month includes:   - Adjust exercise as discussed (increase, has equipment in her basement)  - Continue to prioritize protein, fiber, and hydration.   - Decrease wegovy to 1.7mg due to constipation and oversuppressed appetite. Calorie goal [id number] for weight maintenance, is only able to get about 800calories at day on current wegovy dose of 2.4mg.   - % fat is improving with weight loss, goal is to protect lean body mass while decreasing fat mass. Lowest healthy weight would be 120lb.

## 2024-01-29 NOTE — ASSESSMENT & PLAN NOTE
Chronic, worse with wegovy. Decrease wegovy dose. Continue stool softener, increase fiber as appetite improves.

## 2024-01-29 NOTE — PROGRESS NOTES
[facility]  [address]  [place] [address]     Office Note      Date: 2024  Patient Name: Aniya Lomeli  MRN: [id number]  : 1970  Subjective  Subjective     Chief Complaint  Obesity Management follow-up          Aniya Lomeli presents to [facility] for obesity management. s/p 21 LSG/HHR with Dr. Rao., Presurgery weight: 226 pounds.  Patient is satisfied with weight loss progress. Appetite is well controlled, only able to get about 800 calories a day. Can tolerate about 3 bites in one sitting, tries to get as much protein as possible but she is not hitting her protein goal. Reports continued constipation which is chronic but worse with wegovy. Stool softener helps some. The patient is taking multivitamin and is taking fish oil.  The patient is using a food journal.    The patient is exercising, not as much in the winter, with a FITT score of:    Frequency Intensity Time Strength Training   []   0, none []   0 []   0 [x]   0   [x]   1 (1-2x/week) []   1 (light) []   1 (<10 min) []   1 (1x/week)   []   2 (3-5x/week) [x]   2 (moderate) [x]   2 (10-20 min) []   2 (2x/week)   []   3 (daily) []   3 (moderately hard)  []   4 (very hard) []   3 (20-30 min)  []   4 (>30 min) []   3 (3-4x/week)       Review of Systems   Constitutional:  Negative for appetite change and fatigue.   Eyes:  Negative for visual disturbance.   Cardiovascular:  Negative for chest pain and palpitations.   Gastrointestinal:  Negative for constipation and indigestion.   Neurological:  Negative for light-headedness.   All other systems reviewed and are negative.        Current Outpatient Medications:     Biotin 2.5 MG tablet, Take 2.5 mg by mouth Daily., Disp: , Rfl:     FLUoxetine (PROzac) 40 MG capsule, Take 1 capsule by mouth Daily., Disp: 90 capsule, Rfl: 3    Multiple Vitamin (MULTI VITAMIN DAILY PO), Take 1 tablet by mouth Daily., Disp:  ", Rfl:     Omega-3 Fatty Acids (fish oil) 1000 MG capsule capsule, Take 1 capsule by mouth 2 (Two) Times a Day With Meals., Disp: , Rfl:     pantoprazole (PROTONIX) 40 MG EC tablet, Take 1 tablet by mouth Daily., Disp: 90 tablet, Rfl: 3    Semaglutide-Weight Management (Wegovy) 2.4 MG/0.75ML solution auto-injector, Inject 2.4 mg under the skin into the appropriate area as directed 1 (One) Time Per Week., Disp: 3 mL, Rfl: 2    triamterene-hydrochlorothiazide (MAXZIDE-25) 37.5-25 MG per tablet, Take 1 tablet by mouth Daily., Disp: 90 tablet, Rfl: 3    Semaglutide-Weight Management (Wegovy) 1.7 MG/0.75ML solution auto-injector, Inject 0.75 mL under the skin into the appropriate area as directed 1 (One) Time Per Week., Disp: 3 mL, Rfl: 2    Objective   Start weight: 187 pounds.    Total Loss lb/%Loss of beginning body weight (BBW): -48.8 lb/-26.10%  Change in weight since last visit: -16    Body mass index is 24.87 kg/m².   Body composition analysis completed and showed:   Body Fat %: 31.6    Measurements (in inches)  Waist Circumference: 35      Vital Signs:   /68 (BP Location: Left arm, Patient Position: Sitting)   Pulse 72   Resp 16   Ht 158.8 cm (62.5\")   Wt 62.7 kg (138 lb 3.2 oz)   SpO2 99%   BMI 24.87 kg/m²     Physical Exam   General appears stated age and normal appearance   HEENT PERRLA, EOM intact, and conjunctivae normal   Chest/lungs Normal rate, Regular rhythm, and Breathing is unlabored   Extremities without edema   Neuro Good historian and No focal deficit   Skin Warm, dry, intact   Psych normal behavior, normal thought content, and normal concentration     Result Review :                Assessment / Plan        Diagnoses and all orders for this visit:    1. Other obesity (Primary)  Assessment & Plan:  Patient's (Body mass index is 24.87 kg/m².) is now normal with health conditions that include obstructive sleep apnea, hypertension, dyslipidemias, GERD, and prediabetes  .   %fat is now normal " at 31.6%.  Weight is improving with treatment. We discussed pharmacologic options including wegovy . Side effects include: constipation.     I have instructed the patient to continue regular follow up for management of chronic obesity. Patient does meet criteria for use of anorectics at this time as BMI > 27 with coexisting and this medication is indicated for LONG TERM use for management of obesity.     The current plan for this month includes:   - Adjust exercise as discussed (increase, has equipment in her basement)  - Continue to prioritize protein, fiber, and hydration.   - Decrease wegovy to 1.7mg due to constipation and oversuppressed appetite. Calorie goal 6700-3071 for weight maintenance, is only able to get about 800calories at day on current wegovy dose of 2.4mg.   - % fat is improving with weight loss, goal is to protect lean body mass while decreasing fat mass. Lowest healthy weight would be 120lb.            2. Other constipation  Assessment & Plan:  Chronic, worse with wegovy. Decrease wegovy dose. Continue stool softener, increase fiber as appetite improves.       3. Primary hypertension  Assessment & Plan:  Hypertension is improving with lifestyle modifications.  Continue current treatment regimen.  Regular aerobic exercise.  Continue current medications. Advised to follow up with PCP for symptoms of hypotension. BP medication may need to be adjusted.   Blood pressure will be reassessed in 3 months.    Orders:  -     Comprehensive Metabolic Panel; Future    4. Prediabetes  Assessment & Plan:  Repeat labs this month. Denies hypoglycemia. Continue low carb diet, regular physical activity, and weight reduction of 10-15%. Continue wegovy.       Orders:  -     Insulin, Total; Future  -     Comprehensive Metabolic Panel; Future  -     Hemoglobin A1c; Future  -     Semaglutide-Weight Management (Wegovy) 1.7 MG/0.75ML solution auto-injector; Inject 0.75 mL under the skin into the appropriate area as  directed 1 (One) Time Per Week.  Dispense: 3 mL; Refill: 2    5. Mixed hyperlipidemia  Assessment & Plan:  Repeat labwork this month.     Orders:  -     Lipid Panel; Future    6. Fatigue, unspecified type  -     TSH; Future  -     CBC (No Diff); Future  -     T4, Free; Future  -     Vitamin D,25-Hydroxy; Future    7. Weight loss counseling, encounter for    8. Malnutrition screen  -     Vitamin D,25-Hydroxy; Future    9. Weight loss of more than 10% body weight  -     Comprehensive Metabolic Panel; Future  -     Hemoglobin A1c; Future  -     TSH; Future  -     T4, Free; Future        I spent 40 minutes caring for Aniya on this date of service. This time includes time spent by me in the following activities:preparing for the visit, reviewing tests, performing a medically appropriate examination and/or evaluation , counseling and educating the patient/family/caregiver, ordering medications, tests, or procedures, and documenting information in the medical record  Follow Up   Return in about 3 months (around 4/29/2024) for Next scheduled follow up.  Patient was given instructions and counseling regarding her condition or for health maintenance advice. Please see specific information pulled into the AVS if appropriate.     Annette Reeves, APRN  01/29/2024

## 2024-01-29 NOTE — ASSESSMENT & PLAN NOTE
Hypertension is improving with lifestyle modifications.  Continue current treatment regimen.  Regular aerobic exercise.  Continue current medications. Advised to follow up with PCP for symptoms of hypotension. BP medication may need to be adjusted.   Blood pressure will be reassessed in 3 months.

## 2024-01-31 ENCOUNTER — LAB (OUTPATIENT)
Dept: LAB | Facility: HOSPITAL | Age: 54
End: 2024-01-31
Payer: COMMERCIAL

## 2024-01-31 DIAGNOSIS — R53.83 FATIGUE, UNSPECIFIED TYPE: ICD-10-CM

## 2024-01-31 DIAGNOSIS — Z13.21 MALNUTRITION SCREEN: ICD-10-CM

## 2024-01-31 DIAGNOSIS — E78.2 MIXED HYPERLIPIDEMIA: ICD-10-CM

## 2024-01-31 DIAGNOSIS — I10 PRIMARY HYPERTENSION: ICD-10-CM

## 2024-01-31 DIAGNOSIS — R73.03 PREDIABETES: ICD-10-CM

## 2024-01-31 DIAGNOSIS — R63.4 WEIGHT LOSS OF MORE THAN 10% BODY WEIGHT: ICD-10-CM

## 2024-01-31 LAB
25(OH)D3 SERPL-MCNC: 51 NG/ML (ref 30–100)
ALBUMIN SERPL-MCNC: 4.4 G/DL (ref 3.5–5.2)
ALBUMIN/GLOB SERPL: 1.5 G/DL
ALP SERPL-CCNC: 58 U/L (ref 39–117)
ALT SERPL W P-5'-P-CCNC: 9 U/L (ref 1–33)
ANION GAP SERPL CALCULATED.3IONS-SCNC: 11 MMOL/L (ref 5–15)
AST SERPL-CCNC: 14 U/L (ref 1–32)
BILIRUB SERPL-MCNC: 0.5 MG/DL (ref 0–1.2)
BUN SERPL-MCNC: 9 MG/DL (ref 6–20)
BUN/CREAT SERPL: 11.3 (ref 7–25)
CALCIUM SPEC-SCNC: 9.2 MG/DL (ref 8.6–10.5)
CHLORIDE SERPL-SCNC: 102 MMOL/L (ref 98–107)
CHOLEST SERPL-MCNC: 203 MG/DL (ref 0–200)
CO2 SERPL-SCNC: 28 MMOL/L (ref 22–29)
CREAT SERPL-MCNC: 0.8 MG/DL (ref 0.57–1)
DEPRECATED RDW RBC AUTO: 42.2 FL (ref 37–54)
EGFRCR SERPLBLD CKD-EPI 2021: 88.8 ML/MIN/1.73
ERYTHROCYTE [DISTWIDTH] IN BLOOD BY AUTOMATED COUNT: 13.3 % (ref 12.3–15.4)
GLOBULIN UR ELPH-MCNC: 2.9 GM/DL
GLUCOSE SERPL-MCNC: 80 MG/DL (ref 65–99)
HBA1C MFR BLD: 5.1 % (ref 4.8–5.6)
HCT VFR BLD AUTO: 43.1 % (ref 34–46.6)
HDLC SERPL-MCNC: 57 MG/DL (ref 40–60)
HGB BLD-MCNC: 14.6 G/DL (ref 12–15.9)
LDLC SERPL CALC-MCNC: 131 MG/DL (ref 0–100)
LDLC/HDLC SERPL: 2.27 {RATIO}
MCH RBC QN AUTO: 28.9 PG (ref 26.6–33)
MCHC RBC AUTO-ENTMCNC: 33.9 G/DL (ref 31.5–35.7)
MCV RBC AUTO: 85.3 FL (ref 79–97)
PLATELET # BLD AUTO: 368 10*3/MM3 (ref 140–450)
PMV BLD AUTO: 10.6 FL (ref 6–12)
POTASSIUM SERPL-SCNC: 3.5 MMOL/L (ref 3.5–5.2)
PROT SERPL-MCNC: 7.3 G/DL (ref 6–8.5)
RBC # BLD AUTO: 5.05 10*6/MM3 (ref 3.77–5.28)
SODIUM SERPL-SCNC: 141 MMOL/L (ref 136–145)
T4 FREE SERPL-MCNC: 1.29 NG/DL (ref 0.93–1.7)
TRIGL SERPL-MCNC: 84 MG/DL (ref 0–150)
TSH SERPL DL<=0.05 MIU/L-ACNC: 1.78 UIU/ML (ref 0.27–4.2)
VLDLC SERPL-MCNC: 15 MG/DL (ref 5–40)
WBC NRBC COR # BLD AUTO: 6.88 10*3/MM3 (ref 3.4–10.8)

## 2024-01-31 PROCEDURE — 83525 ASSAY OF INSULIN: CPT

## 2024-01-31 PROCEDURE — 82306 VITAMIN D 25 HYDROXY: CPT

## 2024-01-31 PROCEDURE — 84439 ASSAY OF FREE THYROXINE: CPT

## 2024-01-31 PROCEDURE — 80061 LIPID PANEL: CPT

## 2024-01-31 PROCEDURE — 80050 GENERAL HEALTH PANEL: CPT

## 2024-01-31 PROCEDURE — 36415 COLL VENOUS BLD VENIPUNCTURE: CPT

## 2024-01-31 PROCEDURE — 83036 HEMOGLOBIN GLYCOSYLATED A1C: CPT

## 2024-02-01 LAB — INSULIN SERPL-ACNC: 9.1 UIU/ML (ref 2.6–24.9)

## 2024-02-08 ENCOUNTER — OFFICE VISIT (OUTPATIENT)
Dept: FAMILY MEDICINE CLINIC | Facility: CLINIC | Age: 54
End: 2024-02-08
Payer: COMMERCIAL

## 2024-02-08 VITALS
HEIGHT: 63 IN | TEMPERATURE: 97.7 F | SYSTOLIC BLOOD PRESSURE: 128 MMHG | BODY MASS INDEX: 22.5 KG/M2 | DIASTOLIC BLOOD PRESSURE: 84 MMHG | HEART RATE: 96 BPM | WEIGHT: 127 LBS | OXYGEN SATURATION: 98 %

## 2024-02-08 DIAGNOSIS — H81.10 BENIGN PAROXYSMAL POSITIONAL VERTIGO, UNSPECIFIED LATERALITY: Primary | ICD-10-CM

## 2024-02-08 DIAGNOSIS — J01.00 ACUTE NON-RECURRENT MAXILLARY SINUSITIS: ICD-10-CM

## 2024-02-08 PROCEDURE — 99213 OFFICE O/P EST LOW 20 MIN: CPT | Performed by: PHYSICIAN ASSISTANT

## 2024-02-08 RX ORDER — MECLIZINE HCL 12.5 MG/1
12.5 TABLET ORAL 3 TIMES DAILY PRN
Qty: 21 TABLET | Refills: 0 | Status: SHIPPED | OUTPATIENT
Start: 2024-02-08

## 2024-02-08 RX ORDER — PREDNISONE 10 MG/1
10 TABLET ORAL 2 TIMES DAILY
Qty: 10 TABLET | Refills: 0 | Status: SHIPPED | OUTPATIENT
Start: 2024-02-08

## 2024-02-08 RX ORDER — AZITHROMYCIN 250 MG/1
TABLET, FILM COATED ORAL
Qty: 6 TABLET | Refills: 0 | Status: SHIPPED | OUTPATIENT
Start: 2024-02-08

## 2024-02-08 NOTE — PROGRESS NOTES
Subjective   Aniya Lomeli is a 53 y.o. female  Earache (Right ear pain x1 day ) and Dizziness (Dizziness x1 day with some nausea)      History of Present Illness    Devon Lomeli,  1970, presents today for ear pain and dizziness.    The patient describes experiencing vertigo since yesterday morning, 2024, upon rising from bed. However, she notes improvement by the time she prepares for work. Last night, 2024, she awoke to urinate without experiencing dizziness. However, upon awakening this morning, vertigo resumed. She reports associated nausea and tolerable ear pressure and pain, noting a prior history of inner infection. Additionally, she Complains of gum pain and suspects a developing sinus infection, characterized by tenderness over her teeth and in her cheek.     The patient reports a significant weight loss of 100 pounds from her peak weight to her current 100-pound yumi and expresses satisfaction with her medication regimen.    The patient is allergic to SULFA.    The following portions of the patient's history were reviewed and updated as appropriate: allergies, current medications, past social history and problem list    Review of Systems   Constitutional:  Negative for activity change and fever.   HENT:  Positive for sinus pressure and sinus pain. Negative for ear pain.    Respiratory:  Negative for chest tightness and shortness of breath.    Cardiovascular:  Negative for chest pain and palpitations.   Gastrointestinal:  Negative for abdominal pain, diarrhea, nausea and vomiting.   Genitourinary:  Negative for difficulty urinating and dysuria.   Neurological:  Positive for dizziness. Negative for seizures, syncope, facial asymmetry, weakness and numbness.       Objective     Vitals:    24 1341   BP: 128/84   Pulse: 96   Temp: 97.7 °F (36.5 °C)   SpO2: 98%       Physical Exam  Vitals and nursing note reviewed.   Constitutional:       General: She is not in acute  distress.     Appearance: Normal appearance. She is well-developed. She is not ill-appearing, toxic-appearing or diaphoretic.   HENT:      Head: Normocephalic and atraumatic.      Right Ear: Tympanic membrane and ear canal normal. Tympanic membrane is scarred.      Left Ear: Tympanic membrane and ear canal normal. Tympanic membrane is scarred.      Nose:      Right Sinus: Maxillary sinus tenderness present. No frontal sinus tenderness.      Left Sinus: No maxillary sinus tenderness or frontal sinus tenderness.      Mouth/Throat:      Pharynx: No oropharyngeal exudate.   Eyes:      Pupils: Pupils are equal, round, and reactive to light.   Cardiovascular:      Rate and Rhythm: Normal rate and regular rhythm.   Pulmonary:      Effort: Pulmonary effort is normal.      Breath sounds: Normal breath sounds.   Neurological:      Mental Status: She is alert.         Assessment & Plan     Diagnoses and all orders for this visit:    1. Benign paroxysmal positional vertigo, unspecified laterality (Primary)    2. Acute non-recurrent maxillary sinusitis    Other orders  -     predniSONE (DELTASONE) 10 MG tablet; Take 1 tablet by mouth 2 (Two) Times a Day.  Dispense: 10 tablet; Refill: 0  -     azithromycin (Zithromax Z-Serafin) 250 MG tablet; Take 2 tablets by mouth on day 1, then 1 tablet daily on days 2-5  Dispense: 6 tablet; Refill: 0  -     meclizine (ANTIVERT) 12.5 MG tablet; Take 1 tablet by mouth 3 (Three) Times a Day As Needed for Dizziness.  Dispense: 21 tablet; Refill: 0       1. Benign positional vertigo  - I will prescribe meclizine to be taken 3 times daily as needed for dizziness.  - I will prescribe prednisone to be taken twice daily.  - I advised the patient to be careful with her movements, not bending over too fast, standing up too fast when she wakes up in the morning, and sit down on the side of the bed for a minute and make her way up.  - I advised the patient to take it easy on motion or positional changes for  the next several days.  - If she feels like this has not cleared up any of it after she finishes this, she will message me.    2. Sinus infection  - I will prescribe a Z-Serafin      Transcribed from ambient dictation for Temi Rangel PA-C by Pepito Wilson.  02/08/24   19:09 EST    Patient or patient representative verbalized consent to the visit recording.  I have personally performed the services described in this document as transcribed by the above individual, and it is both accurate and complete.

## 2024-03-14 ENCOUNTER — LAB (OUTPATIENT)
Dept: LAB | Facility: HOSPITAL | Age: 54
End: 2024-03-14
Payer: COMMERCIAL

## 2024-03-14 ENCOUNTER — TRANSCRIBE ORDERS (OUTPATIENT)
Dept: LAB | Facility: HOSPITAL | Age: 54
End: 2024-03-14
Payer: COMMERCIAL

## 2024-03-14 DIAGNOSIS — N95.1 SYMPTOMATIC MENOPAUSAL OR FEMALE CLIMACTERIC STATES: Primary | ICD-10-CM

## 2024-03-14 DIAGNOSIS — N95.1 SYMPTOMATIC MENOPAUSAL OR FEMALE CLIMACTERIC STATES: ICD-10-CM

## 2024-03-14 PROCEDURE — 82670 ASSAY OF TOTAL ESTRADIOL: CPT

## 2024-03-14 PROCEDURE — 83001 ASSAY OF GONADOTROPIN (FSH): CPT

## 2024-03-14 PROCEDURE — 36415 COLL VENOUS BLD VENIPUNCTURE: CPT

## 2024-03-15 LAB
ESTRADIOL SERPL HS-MCNC: 51.6 PG/ML
FSH SERPL-ACNC: 51.1 MIU/ML

## 2024-04-18 DIAGNOSIS — R73.03 PREDIABETES: ICD-10-CM

## 2024-04-19 RX ORDER — FLUCONAZOLE 100 MG/1
100 TABLET ORAL DAILY
Qty: 3 TABLET | Refills: 0 | Status: SHIPPED | OUTPATIENT
Start: 2024-04-19 | End: 2024-04-22

## 2024-04-19 RX ORDER — AMOXICILLIN AND CLAVULANATE POTASSIUM 875; 125 MG/1; MG/1
1 TABLET, FILM COATED ORAL 2 TIMES DAILY
Qty: 20 TABLET | Refills: 0 | Status: SHIPPED | OUTPATIENT
Start: 2024-04-19 | End: 2024-04-29

## 2024-04-22 RX ORDER — SEMAGLUTIDE 1.7 MG/.75ML
1.7 INJECTION, SOLUTION SUBCUTANEOUS WEEKLY
Qty: 9 ML | Refills: 0 | Status: SHIPPED | OUTPATIENT
Start: 2024-04-22

## 2024-04-30 ENCOUNTER — OFFICE VISIT (OUTPATIENT)
Dept: BARIATRICS/WEIGHT MGMT | Facility: CLINIC | Age: 54
End: 2024-04-30
Payer: COMMERCIAL

## 2024-04-30 VITALS
SYSTOLIC BLOOD PRESSURE: 108 MMHG | OXYGEN SATURATION: 93 % | WEIGHT: 128.8 LBS | DIASTOLIC BLOOD PRESSURE: 68 MMHG | BODY MASS INDEX: 22.82 KG/M2 | HEART RATE: 82 BPM | HEIGHT: 63 IN

## 2024-04-30 DIAGNOSIS — Z71.3 WEIGHT LOSS COUNSELING, ENCOUNTER FOR: Primary | ICD-10-CM

## 2024-04-30 DIAGNOSIS — R53.83 FATIGUE, UNSPECIFIED TYPE: ICD-10-CM

## 2024-04-30 DIAGNOSIS — I10 PRIMARY HYPERTENSION: ICD-10-CM

## 2024-04-30 DIAGNOSIS — E66.8 OTHER OBESITY: ICD-10-CM

## 2024-04-30 PROCEDURE — 99214 OFFICE O/P EST MOD 30 MIN: CPT | Performed by: NURSE PRACTITIONER

## 2024-04-30 RX ORDER — ASCORBIC ACID 125 MG
TABLET,CHEWABLE ORAL
COMMUNITY

## 2024-04-30 RX ORDER — MELATONIN
1000 DAILY
COMMUNITY

## 2024-04-30 RX ORDER — LORATADINE 10 MG/1
10 TABLET ORAL DAILY
COMMUNITY

## 2024-04-30 RX ORDER — SEMAGLUTIDE 1 MG/.5ML
1 INJECTION, SOLUTION SUBCUTANEOUS WEEKLY
Qty: 6 ML | Refills: 0 | Status: SHIPPED | OUTPATIENT
Start: 2024-07-17

## 2024-04-30 NOTE — PROGRESS NOTES
[facility]  [address]  [place] [address]     Office Note      Date: 2024  Patient Name: Aniya Lomeli  MRN: [id number]  : 1970  Subjective  Subjective     Chief Complaint  Obesity Management follow-up          Aniya Lomeli presents to [facility] for obesity management. s/p 21 LSG/HHR with Dr. Rao., Presurgery weight: 226 pounds.  Patient is satisfied with weight loss progress. Appetite is moderately controlled, she is able to eat more calories since decreasing wegovy to 1.7mg. Now able to hit goal around 1200 calories a day. Reports no side effects of prescribed medications today. The patient is taking multivitamin and is taking fish oil.  The patient is using a food journal.  She would like to discuss alternative anti-obesity medications since wegovy will no longer be on her insurance formulary . She reports fatigue, unsure if it is related to dexter-menopause or her diet/wegovy. Plans to discuss with her PCP.   The patient is exercising with a FITT score of:    Frequency Intensity Time Strength Training   []   0, none []   0 []   0 [x]   0   []   1 (1-2x/week) [x]   1 (light) []   1 (<10 min) []   1 (1x/week)   [x]   2 (3-5x/week) []   2 (moderate) []   2 (10-20 min) []   2 (2x/week)   []   3 (daily) []   3 (moderately hard)  []   4 (very hard) [x]   3 (20-30 min)  []   4 (>30 min) []   3 (3-4x/week)       Review of Systems   Constitutional:  Positive for appetite change and fatigue.   Eyes:  Negative for visual disturbance.   Cardiovascular:  Negative for chest pain and palpitations.   Gastrointestinal:  Negative for constipation and indigestion.   Neurological:  Negative for light-headedness.         Current Outpatient Medications:     Biotin 2.5 MG tablet, Take 2.5 mg by mouth Daily., Disp: , Rfl:     FLUoxetine (PROzac) 40 MG capsule, Take 1 capsule by mouth Daily., Disp: 90 capsule, Rfl:  "3    Multiple Vitamin (MULTI VITAMIN DAILY PO), Take 1 tablet by mouth Daily., Disp: , Rfl:     Omega-3 Fatty Acids (fish oil) 1000 MG capsule capsule, Take 1 capsule by mouth 2 (Two) Times a Day With Meals., Disp: , Rfl:     pantoprazole (PROTONIX) 40 MG EC tablet, Take 1 tablet by mouth Daily., Disp: 90 tablet, Rfl: 3    Semaglutide-Weight Management (Wegovy) 1.7 MG/0.75ML solution auto-injector, Inject 0.75 mL under the skin into the appropriate area as directed 1 (One) Time Per Week., Disp: 9 mL, Rfl: 0    triamterene-hydrochlorothiazide (MAXZIDE-25) 37.5-25 MG per tablet, Take 1 tablet by mouth Daily., Disp: 90 tablet, Rfl: 3    Cholecalciferol 25 MCG (1000 UT) tablet, Take 1 tablet by mouth Daily., Disp: , Rfl:     Cyanocobalamin (B-12) 5000 MCG capsule, Take  by mouth., Disp: , Rfl:     loratadine (Claritin) 10 MG tablet, Take 1 tablet by mouth Daily., Disp: , Rfl:     meclizine (ANTIVERT) 12.5 MG tablet, Take 1 tablet by mouth 3 (Three) Times a Day As Needed for Dizziness. (Patient not taking: Reported on 4/30/2024), Disp: 21 tablet, Rfl: 0    [START ON 7/17/2024] Semaglutide-Weight Management (Wegovy) 1 MG/0.5ML solution auto-injector, Inject 0.5 mL under the skin into the appropriate area as directed 1 (One) Time Per Week., Disp: 6 mL, Rfl: 0    Objective   Start weight: 187 pounds.    Total Loss lb/%Loss of beginning body weight (BBW): -58.2lb/-31.12%  Change in weight since last visit: -9.5    Body mass index is 23.18 kg/m².   Body composition analysis completed and showed:   Body Fat %: 24.4    Measurements (in inches)  Waist Circumference: 36      Vital Signs:   /68 (BP Location: Left arm, Patient Position: Sitting)   Pulse 82   Ht 158.8 cm (62.5\")   Wt 58.4 kg (128 lb 12.8 oz)   SpO2 93%   BMI 23.18 kg/m²     Physical Exam   General appears stated age and normal appearance   HEENT PERRLA, EOM intact, and conjunctivae normal   Chest/lungs Normal rate, Regular rhythm, and Breathing is " unlabored   Extremities without edema   Neuro Good historian and No focal deficit   Skin Warm, dry, intact   Psych normal behavior, normal thought content, and normal concentration     Result Review :                Assessment / Plan        Diagnoses and all orders for this visit:    1. Weight loss counseling, encounter for (Primary)  Assessment & Plan:  - Continue electronic food journal with calorie goal of 1300/day and at least 60g/day of protein.  - Keep sweets limited.    - Recommend to continue 150 minutes a week of moderate intensity exercise with 2 resistance training sessions weekly.   - Taper off of wegovy as discussed.   - Continue adequate water intake (at least 65oz/day)          2. Other obesity  Assessment & Plan:  Patient's (Body mass index is 23.18 kg/m².) is normal with comorbid health conditions that include obstructive sleep apnea, hypertension, dyslipidemias, and prediabetes  .   %fat (24.4%) is approaching low normal (normal for age and sex is 23-33.9%).   Weight is improving with treatment (wegovy)  Has 3 month supply of wegovy 1.7mg. Will decrease to 1mg with next refill (2nd week of July) and taper off due to insurance formulary change and normal body composition. For increase in weight >5% or increase in appetite next visit consider wellbutrin/naltrexone, diethylpropion, phendimetrazine, metformin, topamax. Hx phentermine: jittery  Treatment goal 130-140lb.         Orders:  -     Semaglutide-Weight Management (Wegovy) 1 MG/0.5ML solution auto-injector; Inject 0.5 mL under the skin into the appropriate area as directed 1 (One) Time Per Week.  Dispense: 6 mL; Refill: 0    3. Primary hypertension  Assessment & Plan:  Hypertension is stable and controlled, plans to discuss coming off of BP medication with her PCP since her BP is running <110/70.  Regular aerobic exercise.  Maintain healthy weight.   Blood pressure will be reassessed in 3 months.      4. Fatigue, unspecified type  Assessment &  Plan:  Continue vitamins. May be diet or wegovy related, will be tapering off. May also be dexter-menopause or BP related. Consider wellbutrin or low dose sympathomimetic next visit if no better.             Follow Up   Return in about 3 months (around 7/30/2024) for Next scheduled follow up.  Patient was given instructions and counseling regarding her condition or for health maintenance advice. Please see specific information pulled into the AVS if appropriate.     Annette Reeves, DEVIN  04/30/2024

## 2024-04-30 NOTE — ASSESSMENT & PLAN NOTE
Continue vitamins. May be diet or wegovy related, will be tapering off. May also be dexter-menopause or BP related. Consider wellbutrin or low dose sympathomimetic next visit if no better.

## 2024-04-30 NOTE — ASSESSMENT & PLAN NOTE
- Continue electronic food journal with calorie goal of 1300/day and at least 60g/day of protein.  - Keep sweets limited.    - Recommend to continue 150 minutes a week of moderate intensity exercise with 2 resistance training sessions weekly.   - Taper off of wegovy as discussed.   - Continue adequate water intake (at least 65oz/day)

## 2024-04-30 NOTE — ASSESSMENT & PLAN NOTE
Improving, A1c now 5.1. Denies hypoglycemia. Continue low carb diet, regular physical activity, and weight reduction of 10-15%. Weaning off of wegovy.

## 2024-04-30 NOTE — ASSESSMENT & PLAN NOTE
Patient's (Body mass index is 23.18 kg/m².) is normal with comorbid health conditions that include obstructive sleep apnea, hypertension, dyslipidemias, and prediabetes  .   %fat (24.4%) is approaching low normal (normal for age and sex is 23-33.9%).   Weight is improving with treatment (wegovy)  Has 3 month supply of wegovy 1.7mg. Will decrease to 1mg with next refill (2nd week of July) and taper off due to insurance formulary change and normal body composition. For increase in weight >5% or increase in appetite next visit consider wellbutrin/naltrexone, diethylpropion, phendimetrazine, metformin, topamax. Hx phentermine: jittery  Treatment goal 130-140lb.

## 2024-06-17 ENCOUNTER — TRANSCRIBE ORDERS (OUTPATIENT)
Dept: ADMINISTRATIVE | Facility: HOSPITAL | Age: 54
End: 2024-06-17
Payer: COMMERCIAL

## 2024-06-17 DIAGNOSIS — Z12.31 VISIT FOR SCREENING MAMMOGRAM: Primary | ICD-10-CM

## 2024-07-02 RX ORDER — PANTOPRAZOLE SODIUM 40 MG/1
40 TABLET, DELAYED RELEASE ORAL DAILY
Qty: 90 TABLET | Refills: 3 | Status: SHIPPED | OUTPATIENT
Start: 2024-07-02

## 2024-07-03 ENCOUNTER — HOSPITAL ENCOUNTER (OUTPATIENT)
Facility: HOSPITAL | Age: 54
Discharge: HOME | End: 2024-07-03
Admitting: FAMILY MEDICINE
Payer: COMMERCIAL

## 2024-07-03 ENCOUNTER — OFFICE VISIT (OUTPATIENT)
Dept: BARIATRICS/WEIGHT MGMT | Facility: CLINIC | Age: 54
End: 2024-07-03
Payer: COMMERCIAL

## 2024-07-03 VITALS
WEIGHT: 125.6 LBS | HEIGHT: 63 IN | BODY MASS INDEX: 22.25 KG/M2 | DIASTOLIC BLOOD PRESSURE: 80 MMHG | HEART RATE: 84 BPM | SYSTOLIC BLOOD PRESSURE: 122 MMHG

## 2024-07-03 DIAGNOSIS — R73.03 PREDIABETES: ICD-10-CM

## 2024-07-03 DIAGNOSIS — E66.8 OTHER OBESITY: Primary | ICD-10-CM

## 2024-07-03 DIAGNOSIS — Z71.3 WEIGHT LOSS COUNSELING, ENCOUNTER FOR: ICD-10-CM

## 2024-07-03 DIAGNOSIS — Z12.31 VISIT FOR SCREENING MAMMOGRAM: ICD-10-CM

## 2024-07-03 DIAGNOSIS — F41.1 ANXIETY, GENERALIZED: ICD-10-CM

## 2024-07-03 PROCEDURE — 99214 OFFICE O/P EST MOD 30 MIN: CPT | Performed by: NURSE PRACTITIONER

## 2024-07-03 PROCEDURE — 77067 SCR MAMMO BI INCL CAD: CPT

## 2024-07-03 PROCEDURE — 77063 BREAST TOMOSYNTHESIS BI: CPT

## 2024-07-03 RX ORDER — BUPROPION HYDROCHLORIDE 150 MG/1
TABLET, EXTENDED RELEASE ORAL
Qty: 60 TABLET | Refills: 0 | Status: SHIPPED | OUTPATIENT
Start: 2024-07-03 | End: 2024-08-08

## 2024-07-03 RX ORDER — LEVONORGESTREL 52 MG/1
INTRAUTERINE DEVICE INTRAUTERINE
COMMUNITY

## 2024-07-03 RX ORDER — SEMAGLUTIDE 1.7 MG/.75ML
1.7 INJECTION, SOLUTION SUBCUTANEOUS WEEKLY
Qty: 9 ML | Refills: 0 | Status: SHIPPED | OUTPATIENT
Start: 2024-07-03

## 2024-07-03 RX ORDER — NALTREXONE HYDROCHLORIDE 50 MG/1
TABLET, FILM COATED ORAL
Qty: 30 TABLET | Refills: 2 | Status: SHIPPED | OUTPATIENT
Start: 2024-07-03 | End: 2024-10-02

## 2024-07-03 NOTE — ASSESSMENT & PLAN NOTE
Psychological condition is stable.  Start wellbutrin  Psychological condition  will be reassessed at the next regular appointment.

## 2024-07-03 NOTE — ASSESSMENT & PLAN NOTE
Continue current macronutrient goals. Increase exercise back to 150min/week of cardio and resistance training 2X. We discussed obesity as a chronic, progressive disease. Discussed that while there is a risk of weight regain with stopping GLP-1, this has not been studied in gastric sleeve patients and that there are several other anti-obesity medications that she has not taken.    Respiratory

## 2024-07-03 NOTE — PROGRESS NOTES
[facility]  [address]  [place] [address]     Office Note      Date: 2024  Patient Name: Aniya Lomeli  MRN: [id number]  : 1970  Subjective  Subjective     Chief Complaint  Obesity Management follow-up          Aniya Lomeli presents to [facility] for obesity management. s/p 21 LSG/HHR with Dr. Rao., Presurgery weight: 226 pounds.  Patient is satisfied with weight loss progress. Appetite is well controlled until the last 2 days, she has been taking her wegovy every 14 days to make it last longer since it will no longer be covered by her insurance after 24 . Reports no side effects of prescribed medications today. The patient is taking multivitamin and is taking fish oil.  The patient is using a food journal.  Keeping calories 7585-4065/day, protein is 60-80g/day. Water intake is around 100oz a day. Has an elliptical that she uses twice a week. She is working as a  now, more sedentary. She'll be working at home starting in the fall so she will not spend as much time with commute and plans to exercise.   The patient is exercising with a FITT score of:    Frequency Intensity Time Strength Training   []   0, none []   0 []   0 [x]   0   [x]   1 (1-2x/week) []   1 (light) []   1 (<10 min) []   1 (1x/week)   []   2 (3-5x/week) [x]   2 (moderate) []   2 (10-20 min) []   2 (2x/week)   []   3 (daily) []   3 (moderately hard)  []   4 (very hard) [x]   3 (20-30 min)  []   4 (>30 min) []   3 (3-4x/week)       Review of Systems   Constitutional:  Negative for appetite change and fatigue.   Eyes:  Negative for visual disturbance.   Cardiovascular:  Negative for chest pain and palpitations.   Gastrointestinal:  Negative for constipation and indigestion.   Neurological:  Negative for light-headedness.   All other systems reviewed and are negative.        Current Outpatient Medications:     Biotin 2.5 MG  tablet, Take 2.5 mg by mouth Daily., Disp: , Rfl:     Cholecalciferol 25 MCG (1000 UT) tablet, Take 1 tablet by mouth Daily., Disp: , Rfl:     Cyanocobalamin (B-12) 5000 MCG capsule, Take  by mouth., Disp: , Rfl:     FLUoxetine (PROzac) 40 MG capsule, Take 1 capsule by mouth Daily., Disp: 90 capsule, Rfl: 3    loratadine (Claritin) 10 MG tablet, Take 1 tablet by mouth Daily., Disp: , Rfl:     meclizine (ANTIVERT) 12.5 MG tablet, Take 1 tablet by mouth 3 (Three) Times a Day As Needed for Dizziness., Disp: 21 tablet, Rfl: 0    Multiple Vitamin (MULTI VITAMIN DAILY PO), Take 1 tablet by mouth Daily., Disp: , Rfl:     Omega-3 Fatty Acids (fish oil) 1000 MG capsule capsule, Take 1 capsule by mouth 2 (Two) Times a Day With Meals., Disp: , Rfl:     pantoprazole (PROTONIX) 40 MG EC tablet, Take 1 tablet by mouth Daily., Disp: 90 tablet, Rfl: 3    Semaglutide-Weight Management (Wegovy) 1.7 MG/0.75ML solution auto-injector, Inject 0.75 mL under the skin into the appropriate area as directed 1 (One) Time Per Week., Disp: 9 mL, Rfl: 0    triamterene-hydrochlorothiazide (MAXZIDE-25) 37.5-25 MG per tablet, Take 1 tablet by mouth Daily., Disp: 90 tablet, Rfl: 3    buPROPion SR (Wellbutrin SR) 150 MG 12 hr tablet, Take 1 tablet by mouth Daily for 7 days, THEN 1 tablet 2 (Two) Times a Day therafter, Disp: 60 tablet, Rfl: 0    Levonorgestrel (Mirena, 52 MG,) 20 MCG/DAY intrauterine device IUD, as directed Intrauterine, Disp: , Rfl:     naltrexone (DEPADE) 50 MG tablet, Take 0.5 (one-half) tablet by mouth daily for 3 days or until nausea subsides; THEN increase to take 0.5 tablet twice a day thereafter., Disp: 30 tablet, Rfl: 2    [START ON 7/17/2024] Semaglutide-Weight Management (Wegovy) 1 MG/0.5ML solution auto-injector, Inject 0.5 mL under the skin into the appropriate area as directed 1 (One) Time Per Week. (Patient not taking: Reported on 7/3/2024), Disp: 6 mL, Rfl: 0    Objective   Start weight: 187 pounds.    Total Loss  "lb/%Loss of beginning body weight (BBW): -61.4lb/-32.8%  Change in weight since last visit: -3.2lb    Body mass index is 22.61 kg/m².   Body composition analysis completed and showed:   Body Fat %: 19.5    Measurements (in inches)  Waist Circumference: 34      Vital Signs:   /80 (BP Location: Right arm, Patient Position: Sitting)   Pulse 84   Ht 158.8 cm (62.5\")   Wt 57 kg (125 lb 9.6 oz)   BMI 22.61 kg/m²     Physical Exam   General appears stated age and normal appearance   HEENT PERRLA, EOM intact, and conjunctivae normal   Chest/lungs Normal rate, Regular rhythm, and Breathing is unlabored   Extremities without edema   Neuro Good historian and No focal deficit   Skin Warm, dry, intact   Psych normal behavior, normal thought content, and normal concentration     Result Review :                Assessment / Plan        Diagnoses and all orders for this visit:    1. Other obesity (Primary)  Assessment & Plan:  In remission. Patient's (Body mass index is 23.18 kg/m².) is normal with comorbid health conditions that include obstructive sleep apnea, hypertension, dyslipidemias, and prediabetes  .   %fat (19.5%) is now normal.   Continue to extend wegovy dose to every 14 days. Start wellbutrin/naltrexone to help with increase in hunger and stress eating. Most common adverse reactions of this drug combination include nausea constipation headache vomiting dizziness insomnia dry mouth and diarrhea.  Discussed it can cause an increase in systolic blood pressure and/or resting heart rate.          Orders:  -     naltrexone (DEPADE) 50 MG tablet; Take 0.5 (one-half) tablet by mouth daily for 3 days or until nausea subsides; THEN increase to take 0.5 tablet twice a day thereafter.  Dispense: 30 tablet; Refill: 2  -     buPROPion SR (Wellbutrin SR) 150 MG 12 hr tablet; Take 1 tablet by mouth Daily for 7 days, THEN 1 tablet 2 (Two) Times a Day therafter  Dispense: 60 tablet; Refill: 0    2. Weight loss counseling, " encounter for  Assessment & Plan:  Continue current macronutrient goals. Increase exercise back to 150min/week of cardio and resistance training 2X. We discussed obesity as a chronic, progressive disease. Discussed that while there is a risk of weight regain with stopping GLP-1, this has not been studied in gastric sleeve patients and that there are several other anti-obesity medications that she has not taken.       3. Anxiety, generalized  Assessment & Plan:  Psychological condition is stable.  Start wellbutrin  Psychological condition  will be reassessed at the next regular appointment.            Follow Up   Return in about 6 weeks (around 8/14/2024) for Next scheduled follow up.  Patient was given instructions and counseling regarding her condition or for health maintenance advice. Please see specific information pulled into the AVS if appropriate.     Annette Reeves, DEVIN  07/03/2024

## 2024-07-03 NOTE — ASSESSMENT & PLAN NOTE
In remission. Patient's (Body mass index is 23.18 kg/m².) is normal with comorbid health conditions that include obstructive sleep apnea, hypertension, dyslipidemias, and prediabetes  .   %fat (19.5%) is now normal.   Continue to extend wegovy dose to every 14 days. Start wellbutrin/naltrexone to help with increase in hunger and stress eating. Most common adverse reactions of this drug combination include nausea constipation headache vomiting dizziness insomnia dry mouth and diarrhea.  Discussed it can cause an increase in systolic blood pressure and/or resting heart rate.

## 2024-08-09 ENCOUNTER — OFFICE VISIT (OUTPATIENT)
Dept: FAMILY MEDICINE CLINIC | Facility: CLINIC | Age: 54
End: 2024-08-09
Payer: COMMERCIAL

## 2024-08-09 VITALS
RESPIRATION RATE: 16 BRPM | DIASTOLIC BLOOD PRESSURE: 68 MMHG | OXYGEN SATURATION: 99 % | TEMPERATURE: 98 F | HEART RATE: 94 BPM | WEIGHT: 127 LBS | HEIGHT: 63 IN | BODY MASS INDEX: 22.5 KG/M2 | SYSTOLIC BLOOD PRESSURE: 120 MMHG

## 2024-08-09 DIAGNOSIS — F41.1 ANXIETY, GENERALIZED: Primary | ICD-10-CM

## 2024-08-09 DIAGNOSIS — I10 PRIMARY HYPERTENSION: ICD-10-CM

## 2024-08-09 DIAGNOSIS — B00.9 HERPES SIMPLEX: ICD-10-CM

## 2024-08-09 DIAGNOSIS — K59.09 OTHER CONSTIPATION: ICD-10-CM

## 2024-08-09 PROCEDURE — 99214 OFFICE O/P EST MOD 30 MIN: CPT | Performed by: FAMILY MEDICINE

## 2024-08-09 RX ORDER — VALACYCLOVIR HYDROCHLORIDE 500 MG/1
500 TABLET, FILM COATED ORAL 3 TIMES DAILY
Qty: 30 TABLET | Refills: 5 | Status: SHIPPED | OUTPATIENT
Start: 2024-08-09

## 2024-08-09 NOTE — PROGRESS NOTES
Subjective   Aniya Lomeli is a 54 y.o. female    Chief Complaint    Herpes simplex outbreak  Hypertension  Anxiety and depression  Constipation    History of Present Illness  History of Present Illness  The patient is a 54-year-old female who presents for follow-up of anxiety. She wants to wean off her medications of Wellbutrin and Prozac. She is having adverse effects such as severe nightmares.    She reports overall good health, but expresses a desire to discontinue some of her medications. She is currently on Maxzide for blood pressure management and Prozac for anxiety, with a previous attempt to discontinue these medications. She has discontinued the use of Wellbutrin. due to its side effects, including severe nightmares. She is also taking a vitamin D supplement.    She suspects a recurrence of her shingles outbreak, which she has been hesitant to discuss with her healthcare providers. She describes the outbreak as located on her sciatic nerve, radiating down her leg. She believes she was previously prescribed Valtrex. She notes that the outbreaks typically occur when she is under high stress or without air conditioning for 48 hours.    She is experiencing constipation. Last night, she experienced cramping, similar to a charley horse, in her rib cage that lasted for almost an hour. She suspects this may be related to her constipation. She suspects she has stool trapped in her rib cage. She was previously on a stool softener for a long time, but it did not help. After her weight loss surgery, she was unable to take the large fat stool softener pill. She has tried MiraLAX, but not consistently.  She occasionally experiences severe abdominal pain, to the point where she feels pregnant again.    She is receiving Wegovy injections every 2 weeks for maintenance.     She is eager to discontinue her blood pressure medication, as her blood pressure was higher this morning than it has been in a  while.    IMMUNIZATIONS  She has received both doses of the shingles vaccine.      The following portions of the patient's history were reviewed and updated as appropriate: allergies, current medications, past social history and problem list    Review of Systems   Constitutional:  Negative for appetite change, chills, fatigue, fever and unexpected weight change.   Respiratory:  Negative for cough, chest tightness and shortness of breath.    Cardiovascular:  Negative for chest pain, palpitations and leg swelling.   Gastrointestinal:  Positive for abdominal pain and constipation. Negative for abdominal distention, blood in stool, diarrhea, nausea and vomiting.   Genitourinary:  Negative for difficulty urinating and dysuria.   Musculoskeletal:  Negative for arthralgias and back pain.   Skin:  Positive for color change and rash. Negative for pallor and wound.   Allergic/Immunologic: Negative for food allergies.   Neurological:  Negative for dizziness, tremors, syncope, weakness, light-headedness and headaches.   Psychiatric/Behavioral:  Positive for dysphoric mood and sleep disturbance. Negative for agitation, behavioral problems, confusion, decreased concentration and suicidal ideas. The patient is nervous/anxious.      Objective     Vitals:    08/09/24 1127   BP: 120/68   Pulse: 94   Resp: 16   Temp: 98 °F (36.7 °C)   SpO2: 99%       Physical Exam  Vitals and nursing note reviewed.   Constitutional:       General: She is not in acute distress.     Appearance: Normal appearance. She is well-developed. She is not ill-appearing, toxic-appearing or diaphoretic.   HENT:      Head: Normocephalic and atraumatic.   Eyes:      General: No scleral icterus.     Conjunctiva/sclera: Conjunctivae normal.      Pupils: Pupils are equal, round, and reactive to light.   Neck:      Vascular: No carotid bruit or JVD.   Cardiovascular:      Rate and Rhythm: Normal rate and regular rhythm.      Pulses: Normal pulses.      Heart sounds:  Normal heart sounds. No murmur heard.  Pulmonary:      Effort: Pulmonary effort is normal. No respiratory distress.      Breath sounds: Normal breath sounds.   Abdominal:      Palpations: Abdomen is soft.      Tenderness: There is no abdominal tenderness.   Musculoskeletal:      Right lower leg: No edema.      Left lower leg: No edema.   Skin:     General: Skin is warm and dry.      Coloration: Skin is not pale.      Findings: Erythema and rash present.   Neurological:      General: No focal deficit present.      Mental Status: She is alert and oriented to person, place, and time.   Psychiatric:         Mood and Affect: Mood normal.         Behavior: Behavior normal.   Physical Exam      Assessment & Plan   Assessment & Plan  1. Anxiety.  She will discontinue Wellbutrin and Maxzide, while waiting for Prozac to resume.    2. Shingles.  A prescription for valacyclovir 500 mg, 1 tablet 3 times a day for 10 days, with 5 refills, was provided.    3. Constipation.  She was advised to use Dulcolax suppositories, starting with one pot and repeating if no bowel movement occurs within 30 minutes. Daily use of MiraLAX 2 to 3 times a day was recommended if needed.    4. Hypertension.  She will discontinue triamterene and hydrochlorothiazide.    5. Weight management.  She was advised to discontinue Wegovy.    Problems Addressed this Visit          Cardiac and Vasculature    HTN (hypertension)       Gastrointestinal Abdominal     Other constipation       Mental Health    Anxiety, generalized - Primary     Other Visit Diagnoses       Herpes simplex        Relevant Medications    valACYclovir (Valtrex) 500 MG tablet          Diagnoses         Codes Comments    Anxiety, generalized    -  Primary ICD-10-CM: F41.1  ICD-9-CM: 300.02     Primary hypertension     ICD-10-CM: I10  ICD-9-CM: 401.9     Other constipation     ICD-10-CM: K59.09  ICD-9-CM: 564.09     Herpes simplex     ICD-10-CM: B00.9  ICD-9-CM: 054.9           I spent 35  minutes in patient care: Reviewing records prior to the visit, examining the patient, entering orders and documentation    Part of this note may be an electronic transcription/translation of spoken language to printed text using the Dragon Dictation System.

## 2024-08-14 ENCOUNTER — TELEMEDICINE (OUTPATIENT)
Dept: BARIATRICS/WEIGHT MGMT | Facility: CLINIC | Age: 54
End: 2024-08-14
Payer: COMMERCIAL

## 2024-08-14 VITALS
DIASTOLIC BLOOD PRESSURE: 81 MMHG | WEIGHT: 124.1 LBS | BODY MASS INDEX: 21.99 KG/M2 | HEIGHT: 63 IN | SYSTOLIC BLOOD PRESSURE: 119 MMHG

## 2024-08-14 DIAGNOSIS — R73.03 PREDIABETES: ICD-10-CM

## 2024-08-14 DIAGNOSIS — Z71.3 WEIGHT LOSS COUNSELING, ENCOUNTER FOR: Primary | ICD-10-CM

## 2024-08-14 PROCEDURE — 99214 OFFICE O/P EST MOD 30 MIN: CPT | Performed by: NURSE PRACTITIONER

## 2024-08-14 RX ORDER — METFORMIN HYDROCHLORIDE 500 MG/1
TABLET, EXTENDED RELEASE ORAL
Qty: 53 TABLET | Refills: 0 | Status: SHIPPED | OUTPATIENT
Start: 2024-08-14 | End: 2024-09-13

## 2024-08-14 NOTE — ASSESSMENT & PLAN NOTE
Denies hypoglycemia. Continue low carb diet, regular physical activity, and weight reduction of 10-15%. Continue wegovy every 14 days, start metformin.   - Start metformin. Common side effects reported include nausea, vomiting, diarrhea, abdominal pain, and loss of appetite have been frequently reported during therapy initiation and resolve spontaneously in most cases.

## 2024-08-14 NOTE — PROGRESS NOTES
"     Office Note      Date: 2024  Patient Name: Aniya Lomeli  MRN: [id number]  : 1970    This service was conducted via [employer].  Patient is located at her work address.  Provider is located at her work address. The use of a video visit has been reviewed with the patient and informed consent has been obtained.  Subjective  Subjective     Chief Complaint  Obesity Management follow-up    Subjective          Aniya Lomeli presents to [facility] via telehealth for obesity management. s/p 21 LSG/HHR with Dr. Rao., Presurgery weight: 226 pounds.    Patient is satisfied with weight loss progress. Appetite is well controlled. Hasn't taken wellbutrin for 2 weeks, caused nightmares. Doing wegovy 1.7mg every 2 weeks. Feels like she is having more cravings, naltrexone may be helping but she often misses the evening dose. Maxzide was discontinued with PCP since her BP was improved.   She is not keeping a food journal at this time.   24 hour recall:  B: cheerios  L: left over 1/2 steak, 2 T corn  S: 2 cookies (chocolate chip)  D: pork chops in the air fryer and corn on the cob  Water: 1 diet coke in the am then just water for the rest of the day  The patient is exercising- walking some- plans to start using the elliptical 45 minutes a day once she starts her new job.    Review of Systems   Constitutional:  Negative for appetite change and fatigue.   Eyes:  Negative for visual disturbance.   Cardiovascular:  Negative for chest pain and palpitations.   Gastrointestinal:  Negative for constipation and indigestion.   Neurological:  Negative for light-headedness.         Objective   Start weight: 187 pounds.    Total weight loss: -63 pounds/-33%  Change in weight since last visit: -1lb    Body mass index is 22.34 kg/m².   Measurements (in inches)     /81   Ht 158.8 cm (62.5\")   Wt 56.3 kg (124 lb 1.6 oz)   BMI 22.34 kg/m²       Result Review :               "   Assessment and Plan    Diagnoses and all orders for this visit:    1. Weight loss counseling, encounter for (Primary)  Assessment & Plan:  Bring back daily food journal.   Reviewed how simple sugars like cookies affect fat mass and hunger hormones. Reviewed protein as an ideal energy source for obesity- increase morning protein.   Increase exercise as discussed.   Discussed AOM options: low dose phentermine/topamax, metformin  Continue naltrexone, thinks it helps with cravings some. Start metformin.       2. Prediabetes  Overview:  A1C 6.1  1/31/2024 A1c 5.1 on wegovy 2.4mg    Assessment & Plan:  Denies hypoglycemia. Continue low carb diet, regular physical activity, and weight reduction of 10-15%. Continue wegovy every 14 days, start metformin.   - Start metformin. Common side effects reported include nausea, vomiting, diarrhea, abdominal pain, and loss of appetite have been frequently reported during therapy initiation and resolve spontaneously in most cases.        Orders:  -     metFORMIN ER (GLUCOPHAGE-XR) 500 MG 24 hr tablet; Take 1 tablet by mouth Daily With Dinner for 7 days, THEN 2 tablets Daily With Dinner for 23 days.  Dispense: 53 tablet; Refill: 0          Follow Up   Return in about 1 month (around 9/14/2024) for Next scheduled follow up.  Patient was given instructions and counseling regarding her condition or for health maintenance advice. Please see specific information pulled into the AVS if appropriate.     DEVIN Shultz

## 2024-08-14 NOTE — ASSESSMENT & PLAN NOTE
Bring back daily food journal.   Reviewed how simple sugars like cookies affect fat mass and hunger hormones. Reviewed protein as an ideal energy source for obesity- increase morning protein.   Increase exercise as discussed.   Discussed AOM options: low dose phentermine/topamax, metformin  Continue naltrexone, thinks it helps with cravings some. Start metformin.

## 2024-08-22 ENCOUNTER — TELEPHONE (OUTPATIENT)
Dept: FAMILY MEDICINE CLINIC | Facility: CLINIC | Age: 54
End: 2024-08-22
Payer: COMMERCIAL

## 2024-08-22 ENCOUNTER — TELEPHONE (OUTPATIENT)
Dept: FAMILY MEDICINE CLINIC | Facility: CLINIC | Age: 54
End: 2024-08-22

## 2024-08-22 DIAGNOSIS — K59.09 OTHER CONSTIPATION: Primary | ICD-10-CM

## 2024-08-22 NOTE — TELEPHONE ENCOUNTER
If she has been doing everything I told her including the Dulcolax suppositories and getting no results she probably needs to go to the emergency room and get an x-ray and then possibly an enema to get her moving

## 2024-08-22 NOTE — TELEPHONE ENCOUNTER
Patient stated that she is doubled over, having severe cramps and constipation. She has been taking miralax 2 times a day and doing the suppositories as recommended. Patient believes it is not helping.     Please advise and give patient a call back.
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
## 2024-09-08 DIAGNOSIS — R73.03 PREDIABETES: ICD-10-CM

## 2024-09-09 RX ORDER — METFORMIN HCL 500 MG
TABLET, EXTENDED RELEASE 24 HR ORAL
Qty: 53 TABLET | Refills: 0 | Status: SHIPPED | OUTPATIENT
Start: 2024-09-09 | End: 2024-10-10

## 2024-09-24 ENCOUNTER — OFFICE VISIT (OUTPATIENT)
Dept: GASTROENTEROLOGY | Facility: CLINIC | Age: 54
End: 2024-09-24
Payer: COMMERCIAL

## 2024-09-24 VITALS
WEIGHT: 124 LBS | DIASTOLIC BLOOD PRESSURE: 72 MMHG | HEART RATE: 78 BPM | BODY MASS INDEX: 21.97 KG/M2 | TEMPERATURE: 97.8 F | SYSTOLIC BLOOD PRESSURE: 124 MMHG | OXYGEN SATURATION: 99 % | HEIGHT: 63 IN

## 2024-09-24 DIAGNOSIS — K59.04 CHRONIC IDIOPATHIC CONSTIPATION: Primary | ICD-10-CM

## 2024-09-24 PROCEDURE — 99214 OFFICE O/P EST MOD 30 MIN: CPT | Performed by: NURSE PRACTITIONER

## 2024-09-29 RX ORDER — TRIAMTERENE/HYDROCHLOROTHIAZID 37.5-25 MG
1 TABLET ORAL DAILY
Qty: 90 TABLET | Refills: 3 | Status: CANCELLED | OUTPATIENT
Start: 2024-09-29 | End: 2025-05-06

## 2024-09-30 DIAGNOSIS — K59.04 CHRONIC IDIOPATHIC CONSTIPATION: ICD-10-CM

## 2024-10-16 DIAGNOSIS — K59.04 CHRONIC IDIOPATHIC CONSTIPATION: Primary | ICD-10-CM

## 2024-10-16 RX ORDER — PLECANATIDE 3 MG/1
3 TABLET ORAL DAILY
Qty: 30 TABLET | Refills: 5 | Status: SHIPPED | OUTPATIENT
Start: 2024-10-16

## 2024-10-17 ENCOUNTER — OFFICE VISIT (OUTPATIENT)
Dept: BARIATRICS/WEIGHT MGMT | Facility: CLINIC | Age: 54
End: 2024-10-17
Payer: COMMERCIAL

## 2024-10-17 VITALS
SYSTOLIC BLOOD PRESSURE: 112 MMHG | WEIGHT: 117.2 LBS | BODY MASS INDEX: 20.77 KG/M2 | HEIGHT: 63 IN | HEART RATE: 84 BPM | DIASTOLIC BLOOD PRESSURE: 78 MMHG

## 2024-10-17 DIAGNOSIS — R73.03 PREDIABETES: ICD-10-CM

## 2024-10-17 DIAGNOSIS — Z71.3 WEIGHT LOSS COUNSELING, ENCOUNTER FOR: Primary | ICD-10-CM

## 2024-10-17 DIAGNOSIS — E66.89 OTHER OBESITY NOT ELSEWHERE CLASSIFIED: ICD-10-CM

## 2024-10-17 RX ORDER — NALTREXONE HYDROCHLORIDE 50 MG/1
50 TABLET, FILM COATED ORAL DAILY
Qty: 30 TABLET | Refills: 2 | Status: SHIPPED | OUTPATIENT
Start: 2024-10-17

## 2024-10-17 RX ORDER — METFORMIN HCL 500 MG
1500 TABLET, EXTENDED RELEASE 24 HR ORAL
Qty: 90 TABLET | Refills: 2 | Status: SHIPPED | OUTPATIENT
Start: 2024-10-17

## 2024-10-17 RX ORDER — NALTREXONE HYDROCHLORIDE 50 MG/1
50 TABLET, FILM COATED ORAL DAILY
COMMUNITY
End: 2024-10-17 | Stop reason: SDUPTHER

## 2024-10-17 NOTE — ASSESSMENT & PLAN NOTE
In remission with wegovy. Denies hypoglycemia. Continue low carb diet, regular physical activity, and weight reduction of 10-15%. Taking wegovy every other week due to coverage change, has enough for 4-5 months. Increase metformin to therapeutic dose.

## 2024-10-17 NOTE — PROGRESS NOTES
[facility]  [address]  [place] [address]     Office Note      Date: 10/17/2024  Patient Name: Aniya Lomeli  MRN: [id number]  : 1970  Subjective  Subjective     Chief Complaint  Obesity Management follow-up          Aniya Lomeli presents to [facility] for obesity management.   Patient is satisfied with weight loss progress. Appetite is moderately controlled. Reports no side effects of prescribed medications today. Taking wegovy every other week. The patient is taking multivitamin and is taking fish oil. The patient is not using a food journal.    The patient is exercising with a FITT score of:    Frequency Intensity Time Strength Training   []   0, none []   0 []   0 [x]   0   [x]   1 (1-2x/week) []   1 (light) []   1 (<10 min) []   1 (1x/week)   []   2 (3-5x/week) [x]   2 (moderate) [x]   2 (10-20 min) []   2 (2x/week)   []   3 (daily) []   3 (moderately hard)  []   4 (very hard) []   3 (20-30 min)  []   4 (>30 min) []   3 (3-4x/week)       24 hour recall:   Breakfast: yoplait yogurt, hash brown 10/16/24, today was 2 pieces rebolledo and a scrambled egg  Lunch: 1/2 of a 6 inch subway club sandwich- italian bread, turkey, ham, roast beef, provolone, lettuce tomato, olive oil, bowl of regularJello  Dinner: 1/2 fried chicken breast, 3 tbsp mashed potatoes, 2 tbsp corn pudding, 3 slices fried green tomatoes  Water Intake: 100+ oz    Review of Systems   Constitutional:  Negative for appetite change and fatigue.   Eyes:  Negative for visual disturbance.   Cardiovascular:  Negative for chest pain and palpitations.   Gastrointestinal:  Positive for constipation (managed by gastro). Negative for indigestion.   Neurological:  Negative for light-headedness.         Current Outpatient Medications:     metFORMIN ER (GLUCOPHAGE-XR) 500 MG 24 hr tablet, Take 3 tablets by mouth Daily With Dinner., Disp: 90 tablet, Rfl: 2     naltrexone (DEPADE) 50 MG tablet, Take 1 tablet by mouth Daily., Disp: 30 tablet, Rfl: 2    Biotin 2.5 MG tablet, Take 2.5 mg by mouth Daily., Disp: , Rfl:     Cholecalciferol 25 MCG (1000 UT) tablet, Take 1 tablet by mouth Daily., Disp: , Rfl:     Cyanocobalamin (B-12) 5000 MCG capsule, Take  by mouth., Disp: , Rfl:     FLUoxetine (PROzac) 40 MG capsule, Take 1 capsule by mouth Daily., Disp: 90 capsule, Rfl: 3    Levonorgestrel (Mirena, 52 MG,) 20 MCG/DAY intrauterine device IUD, as directed Intrauterine, Disp: , Rfl:     loratadine (Claritin) 10 MG tablet, Take 1 tablet by mouth Daily., Disp: , Rfl:     meclizine (ANTIVERT) 12.5 MG tablet, Take 1 tablet by mouth 3 (Three) Times a Day As Needed for Dizziness., Disp: 21 tablet, Rfl: 0    Multiple Vitamin (MULTI VITAMIN DAILY PO), Take 1 tablet by mouth Daily., Disp: , Rfl:     Omega-3 Fatty Acids (fish oil) 1000 MG capsule capsule, Take 1 capsule by mouth 2 (Two) Times a Day With Meals., Disp: , Rfl:     pantoprazole (PROTONIX) 40 MG EC tablet, Take 1 tablet by mouth Daily., Disp: 90 tablet, Rfl: 3    Plecanatide (Trulance) 3 MG tablet, Take 1 tablet by mouth Daily., Disp: 30 tablet, Rfl: 5    Semaglutide-Weight Management (Wegovy) 1 MG/0.5ML solution auto-injector, Inject 0.5 mL under the skin into the appropriate area as directed 1 (One) Time Per Week. (Patient taking differently: Inject 0.5 mL under the skin into the appropriate area as directed 1 (One) Time Per Week. Takes every other week), Disp: 6 mL, Rfl: 0    valACYclovir (Valtrex) 500 MG tablet, Take 1 tablet by mouth 3 (Three) Times a Day. (Patient not taking: Reported on 10/17/2024), Disp: 30 tablet, Rfl: 5    Objective   Start weight: 187 pounds.    Total Loss lb/%Loss of beginning body weight (BBW): -69.8lb/-37.32%  Change in weight since last visit: -6.9    Body mass index is 21.09 kg/m².   Body composition analysis completed and showed:   Body Fat %: 22.1    Measurements (in inches)  Waist  "Circumference: 33      Vital Signs:   /78 (BP Location: Right arm, Patient Position: Sitting)   Pulse 84   Ht 158.8 cm (62.5\")   Wt 53.2 kg (117 lb 3.2 oz)   BMI 21.09 kg/m²     No LMP recorded (lmp unknown). Patient is postmenopausal.    Physical Exam   General appears stated age and normal appearance   HEENT PERRLA, EOM intact, and conjunctivae normal   Chest/lungs Normal rate, Regular rhythm, and Breathing is unlabored   Extremities without edema   Neuro Good historian and No focal deficit   Skin Warm, dry, intact   Psych normal behavior, normal thought content, and normal concentration     Result Review :                Assessment / Plan        Diagnoses and all orders for this visit:    1. Weight loss counseling, encounter for (Primary)  Overview:  AOM options: low dose phentermine/topamax    Assessment & Plan:  Continue current efforts, keep up good protein intake.   Add resistance training. Recommend 150 minutes a week of moderate intensity exercise with 2 resistance training sessions weekly.   Continue to avoid stress eating.       Orders:  -     naltrexone (DEPADE) 50 MG tablet; Take 1 tablet by mouth Daily.  Dispense: 30 tablet; Refill: 2    2. Other obesity not elsewhere classified  Assessment & Plan:  In remission. Patient's (Body mass index is 21.09 kg/m².) is normal with comorbid health conditions that include obstructive sleep apnea, hypertension, dyslipidemias, and prediabetes  .   %fat (22.1%) is now normal, do not advise losing any more fat mass, needs to work on preserving muscle mass.   Continue to extend wegovy dose to every 14 days. Continue naltrexone to help with increase in hunger and stress eating. Continue metformin.              3. Prediabetes  Overview:  A1C 6.1  1/31/2024 A1c 5.1 on wegovy 2.4mg    Assessment & Plan:  In remission with wegovy. Denies hypoglycemia. Continue low carb diet, regular physical activity, and weight reduction of 10-15%. Taking wegovy every other week " due to coverage change, has enough for 4-5 months. Increase metformin to therapeutic dose.       Orders:  -     metFORMIN ER (GLUCOPHAGE-XR) 500 MG 24 hr tablet; Take 3 tablets by mouth Daily With Dinner.  Dispense: 90 tablet; Refill: 2          Follow Up   Return in about 1 month (around 11/17/2024) for Next scheduled follow up.  Patient was given instructions and counseling regarding her condition or for health maintenance advice. Please see specific information pulled into the AVS if appropriate.     Annette Reeves, APRN  10/17/2024

## 2024-10-17 NOTE — ASSESSMENT & PLAN NOTE
In remission. Patient's (Body mass index is 21.09 kg/m².) is normal with comorbid health conditions that include obstructive sleep apnea, hypertension, dyslipidemias, and prediabetes  .   %fat (22.1%) is now normal, do not advise losing any more fat mass, needs to work on preserving muscle mass.   Continue to extend wegovy dose to every 14 days. Continue naltrexone to help with increase in hunger and stress eating. Continue metformin.

## 2024-10-17 NOTE — ASSESSMENT & PLAN NOTE
Continue current efforts, keep up good protein intake.   Add resistance training. Recommend 150 minutes a week of moderate intensity exercise with 2 resistance training sessions weekly.   Continue to avoid stress eating.

## 2024-11-19 RX ORDER — SODIUM, POTASSIUM,MAG SULFATES 17.5-3.13G
354 SOLUTION, RECONSTITUTED, ORAL ORAL TAKE AS DIRECTED
Qty: 354 ML | Refills: 0 | Status: SHIPPED | OUTPATIENT
Start: 2024-11-19 | End: 2024-11-20

## 2024-11-22 NOTE — ASSESSMENT & PLAN NOTE
Hypertension is stable and controlled, plans to discuss coming off of BP medication with her PCP since her BP is running <110/70.  Regular aerobic exercise.  Maintain healthy weight.   Blood pressure will be reassessed in 3 months.   Other

## 2024-12-18 ENCOUNTER — TELEMEDICINE (OUTPATIENT)
Dept: BARIATRICS/WEIGHT MGMT | Facility: CLINIC | Age: 54
End: 2024-12-18
Payer: COMMERCIAL

## 2024-12-18 VITALS
WEIGHT: 119.2 LBS | HEIGHT: 63 IN | DIASTOLIC BLOOD PRESSURE: 79 MMHG | BODY MASS INDEX: 21.12 KG/M2 | SYSTOLIC BLOOD PRESSURE: 117 MMHG

## 2024-12-18 DIAGNOSIS — R73.03 PREDIABETES: ICD-10-CM

## 2024-12-18 DIAGNOSIS — E66.89 OTHER OBESITY NOT ELSEWHERE CLASSIFIED: ICD-10-CM

## 2024-12-18 DIAGNOSIS — Z71.3 WEIGHT LOSS COUNSELING, ENCOUNTER FOR: Primary | ICD-10-CM

## 2024-12-18 RX ORDER — METFORMIN HYDROCHLORIDE 750 MG/1
1500 TABLET, EXTENDED RELEASE ORAL
Qty: 180 TABLET | Refills: 0 | Status: SHIPPED | OUTPATIENT
Start: 2024-12-18

## 2024-12-18 NOTE — ASSESSMENT & PLAN NOTE
The current plan for this month includes:   - Continue current exercise efforts  - Add resistance training  - Continue to work on lifestyle behavioral changes  - Consider restarting food journal even though she is feeling guilty about the foods she is eating, it can provide opportunities to negotiate food choices.   - Continue naltrexone and metformin. Consider compounded GLP-1 like medication if weight continues to increase and appetite is poorly controlled.

## 2024-12-18 NOTE — ASSESSMENT & PLAN NOTE
In remission. Patient's (Body mass index is 21.45 kg/m².) is normal with comorbid health conditions that include obstructive sleep apnea, hypertension, dyslipidemias, and prediabetes  .   Do not advise losing any more fat mass, needs to work on preserving muscle mass.   Continue to extend wegovy dose to every 21 days. Continue naltrexone to help with increase in hunger and stress eating. Continue metformin.

## 2024-12-18 NOTE — ASSESSMENT & PLAN NOTE
Denies hypoglycemia. Continue low carb diet, regular physical activity, and weight reduction of 10-15%. Cont metformin, change to 750mg tablets, ok to take separate or together with food or at bedtime. Repeat A1c next month.

## 2024-12-28 RX ORDER — TRIAMTERENE AND HYDROCHLOROTHIAZIDE 37.5; 25 MG/1; MG/1
1 TABLET ORAL DAILY
Qty: 90 TABLET | Refills: 3 | Status: CANCELLED | OUTPATIENT
Start: 2024-12-28 | End: 2025-08-04

## 2024-12-30 RX ORDER — FLUOXETINE 40 MG/1
40 CAPSULE ORAL DAILY
Qty: 90 CAPSULE | Refills: 3 | Status: SHIPPED | OUTPATIENT
Start: 2024-12-30

## 2025-01-08 ENCOUNTER — OUTSIDE FACILITY SERVICE (OUTPATIENT)
Dept: GASTROENTEROLOGY | Facility: CLINIC | Age: 55
End: 2025-01-08
Payer: COMMERCIAL

## 2025-01-08 PROCEDURE — 45385 COLONOSCOPY W/LESION REMOVAL: CPT | Performed by: INTERNAL MEDICINE

## 2025-01-08 PROCEDURE — 88305 TISSUE EXAM BY PATHOLOGIST: CPT | Performed by: INTERNAL MEDICINE

## 2025-01-09 ENCOUNTER — LAB REQUISITION (OUTPATIENT)
Dept: LAB | Facility: HOSPITAL | Age: 55
End: 2025-01-09
Payer: COMMERCIAL

## 2025-01-09 DIAGNOSIS — K59.04 CHRONIC IDIOPATHIC CONSTIPATION: ICD-10-CM

## 2025-01-09 DIAGNOSIS — K64.8 OTHER HEMORRHOIDS: ICD-10-CM

## 2025-01-09 DIAGNOSIS — D12.8 BENIGN NEOPLASM OF RECTUM: ICD-10-CM

## 2025-01-09 DIAGNOSIS — Z12.11 ENCOUNTER FOR SCREENING FOR MALIGNANT NEOPLASM OF COLON: ICD-10-CM

## 2025-01-23 ENCOUNTER — OFFICE VISIT (OUTPATIENT)
Dept: GASTROENTEROLOGY | Facility: CLINIC | Age: 55
End: 2025-01-23
Payer: COMMERCIAL

## 2025-01-23 ENCOUNTER — HOSPITAL ENCOUNTER (OUTPATIENT)
Dept: GENERAL RADIOLOGY | Facility: HOSPITAL | Age: 55
Discharge: HOME | End: 2025-01-23
Admitting: NURSE PRACTITIONER
Payer: COMMERCIAL

## 2025-01-23 VITALS
BODY MASS INDEX: 21.72 KG/M2 | WEIGHT: 122.6 LBS | HEART RATE: 97 BPM | SYSTOLIC BLOOD PRESSURE: 126 MMHG | OXYGEN SATURATION: 98 % | DIASTOLIC BLOOD PRESSURE: 82 MMHG | TEMPERATURE: 97.2 F | HEIGHT: 63 IN

## 2025-01-23 DIAGNOSIS — K59.04 CHRONIC IDIOPATHIC CONSTIPATION: Primary | ICD-10-CM

## 2025-01-23 DIAGNOSIS — M62.89 PELVIC FLOOR DYSFUNCTION IN FEMALE: ICD-10-CM

## 2025-01-23 DIAGNOSIS — K59.04 CHRONIC IDIOPATHIC CONSTIPATION: ICD-10-CM

## 2025-01-23 PROCEDURE — 74018 RADEX ABDOMEN 1 VIEW: CPT

## 2025-01-23 RX ORDER — LACTULOSE 10 G/15ML
20 SOLUTION ORAL 2 TIMES DAILY PRN
Qty: 1800 ML | Refills: 0 | Status: SHIPPED | OUTPATIENT
Start: 2025-01-23 | End: 2025-02-22

## 2025-01-23 RX ORDER — LUBIPROSTONE 24 UG/1
24 CAPSULE ORAL 2 TIMES DAILY WITH MEALS
Qty: 60 CAPSULE | Refills: 2 | Status: SHIPPED | OUTPATIENT
Start: 2025-01-23

## 2025-01-23 NOTE — PROGRESS NOTES
Follow Up      Patient Name: Aniya Lomeli  : 1970   MRN: [id number]     Chief Complaint: Follow-up constipation      History of Present Illness: Aniya Lomeli is a 54 y.o. female who is here today for follow up on follow-up on chronic idiopathic constipation.    Patient notes she still having significant issues with constipation.  Notes that she will go all week without having a bowel movement and then on Friday will undergo an extensive bowel regimen to hopefully obtain her weekly stool.  Has been taking Linzess, MiraLAX, and Benefiber without much improvement.  Did have some improvement after dose of Linzess was increased after her last GI visit.  In terms of prior therapies, was also tried on Linzess with no improvement.  Notes she is also having issues with significant bloating.  Denies any reflux.  No weight loss.  Does note some improvement constipation with use of Dulcolax.  Endorses that her stool ranges from watery to scybalous in nature.    Had a colonoscopy per Dr. Campos as noted below; single rectal polyp removed that was hyperplastic in nature and noncancerous.    Colonoscopy, Scan (2025)   Colonoscopy per Dr. Campos  Findings of colonoscopy were internal hemorrhoids were noted during retroflexion.  A moderate mostly noted within entire colon interfering with visualization.  1 polyp removed from the rectum.     25 13:09   TISSUE PATHOLOGY EXAM RECTAL POLYP, BIOPSY:     Hyperplastic polyp     Negative for dysplasia or carcinoma   Rpt: View report in Results Review for more information    Subjective      Review of Systems:   Review of Systems   Constitutional: Negative.    HENT: Negative.     Eyes: Negative.    Respiratory: Negative.     Cardiovascular: Negative.    Gastrointestinal:  Positive for abdominal pain and constipation.        [place]   Endocrine: Negative.    Genitourinary: Negative.    Musculoskeletal: Negative.    Skin: Negative.     Allergic/Immunologic: Negative.    Neurological: Negative.    Hematological: Negative.    Psychiatric/Behavioral: Negative.     All other systems reviewed and are negative.      Medications:     Current Outpatient Medications:     Biotin 2.5 MG tablet, Take 2.5 mg by mouth Daily., Disp: , Rfl:     Cholecalciferol 25 MCG (1000 UT) tablet, Take 1 tablet by mouth Daily., Disp: , Rfl:     Cyanocobalamin (B-12) 5000 MCG capsule, Take  by mouth., Disp: , Rfl:     FLUoxetine (PROzac) 40 MG capsule, Take 1 capsule by mouth Daily., Disp: 90 capsule, Rfl: 3    Levonorgestrel (Mirena, 52 MG,) 20 MCG/DAY intrauterine device IUD, as directed Intrauterine, Disp: , Rfl:     linaclotide (LINZESS) 290 MCG capsule capsule, Take 1 capsule by mouth Every Morning Before Breakfast., Disp: , Rfl:     loratadine (Claritin) 10 MG tablet, Take 1 tablet by mouth Daily., Disp: , Rfl:     meclizine (ANTIVERT) 12.5 MG tablet, Take 1 tablet by mouth 3 (Three) Times a Day As Needed for Dizziness., Disp: 21 tablet, Rfl: 0    metFORMIN ER (GLUCOPHAGE-XR) 750 MG 24 hr tablet, Take 2 tablets by mouth Daily With Dinner., Disp: 180 tablet, Rfl: 0    Multiple Vitamin (MULTI VITAMIN DAILY PO), Take 1 tablet by mouth Daily., Disp: , Rfl:     naltrexone (DEPADE) 50 MG tablet, Take 1 tablet by mouth Daily., Disp: 30 tablet, Rfl: 2    Omega-3 Fatty Acids (fish oil) 1000 MG capsule capsule, Take 1 capsule by mouth 2 (Two) Times a Day With Meals. (Patient taking differently: Take 1 capsule by mouth Daily With Breakfast.), Disp: , Rfl:     pantoprazole (PROTONIX) 40 MG EC tablet, Take 1 tablet by mouth Daily., Disp: 90 tablet, Rfl: 3    Plecanatide (Trulance) 3 MG tablet, Take 1 tablet by mouth Daily. (Patient not taking: Reported on 12/18/2024), Disp: 30 tablet, Rfl: 5    Semaglutide-Weight Management (Wegovy) 1 MG/0.5ML solution auto-injector, Inject 0.5 mL under the skin into the appropriate area as directed 1 (One) Time Per Week. (Patient taking  differently: Inject 0.5 mL under the skin into the appropriate area as directed 1 (One) Time Per Week. Takes every other week), Disp: 6 mL, Rfl: 0    valACYclovir (Valtrex) 500 MG tablet, Take 1 tablet by mouth 3 (Three) Times a Day., Disp: 30 tablet, Rfl: 5    Allergies:   Allergies   Allergen Reactions    Sulfa Antibiotics Hives       Social History:   Social History     Socioeconomic History    Marital status:    Tobacco Use    Smoking status: Never    Smokeless tobacco: Never   Vaping Use    Vaping status: Never Used   Substance and Sexual Activity    Alcohol use: Yes     Alcohol/week: 2.0 standard drinks of alcohol     Types: 2 Drinks containing 0.5 oz of alcohol per week     Comment: Rarely, maybe once a month    Drug use: No    Sexual activity: Yes     Partners: Male     Birth control/protection: I.U.D.        Surgical History:   Past Surgical History:   Procedure Laterality Date    ADENOIDECTOMY      BARIATRIC SURGERY  2021     SECTION  1993    CHOLECYSTECTOMY  2019    CHOLECYSTECTOMY WITH INTRAOPERATIVE CHOLANGIOGRAM N/A 2019    Procedure: CHOLECYSTECTOMY LAPAROSCOPIC WITH IOC;  Surgeon: Wali Nicole MD;  Location:  [facility];  Service: General    EAR TUBES  1979    ENDOSCOPY N/A 2021    Procedure: ESOPHAGOGASTRODUODENOSCOPY;  Surgeon: Bandar Rao MD;  Location:  [facility];  Service: Bariatric;  Laterality: N/A;    ERCP N/A 2019    Procedure: ENDOSCOPIC RETROGRADE CHOLANGIOPANCREATOGRAPHY;  Surgeon: Todd Campos MD;  Location:  [facility];  Service: Gastroenterology    GASTRIC SLEEVE LAPAROSCOPIC N/A 2021    Procedure: GASTRIC SLEEVE LAPAROSCOPIC;  Surgeon: Bandar Rao MD;  Location:  [facility];  Service: Bariatric;  Laterality: N/A;    HIATAL HERNIA REPAIR N/A 2021    Procedure: HIATAL HERNIA REPAIR LAPAROSCOPIC;  Surgeon: Bandar Rao MD;  Location:  [facility];  Service: Bariatric;  Laterality: N/A;     INTRAUTERINE DEVICE INSERTION  2015    due for removal    WISDOM TOOTH EXTRACTION  1987        Medical History:   Past Medical History:   Diagnosis Date    Anxiety, generalized     Cancer 7/14/23    Malignant Melanoma    Duodenal ulcer     dx during ERCP 3/2019, no bx, HPSA (-), continues on daily Protonix since    Hiatal hernia with gastroesophageal reflux     controlled w/ daily Protonix, EGD 7/6/20 w/ [facility] revealed small asymmetric HH    HTN (hypertension)     Hyperlipidemia     borderline, no meds    Morbid obesity     Organic mood disorder of depressed type     Prediabetes     A1C 6.1    Sleep apnea with use of continuous positive airway pressure (CPAP)     compliant with machine     Wears glasses     Weight loss counseling, encounter for 04/30/2024        Objective     Physical Exam:  Vital Signs: There were no vitals filed for this visit.  There is no height or weight on file to calculate BMI.     Physical Exam  Vitals and nursing note reviewed.   Constitutional:       General: She is not in acute distress.     Appearance: Normal appearance. She is not ill-appearing or toxic-appearing.   Eyes:      Conjunctiva/sclera: Conjunctivae normal.   Cardiovascular:      Rate and Rhythm: Normal rate and regular rhythm.      Pulses: Normal pulses.      Heart sounds: Normal heart sounds.   Pulmonary:      Effort: Pulmonary effort is normal. No respiratory distress.      Breath sounds: Normal breath sounds.   Abdominal:      General: Abdomen is flat. Bowel sounds are normal. There is no distension.      Palpations: Abdomen is soft. There is no mass.      Tenderness: There is no abdominal tenderness. There is no guarding or rebound.      Hernia: No hernia is present.   Skin:     General: Skin is warm and dry.      Capillary Refill: Capillary refill takes less than 2 seconds.      Coloration: Skin is not jaundiced or pale.   Neurological:      General: No focal deficit present.      Mental Status: She is alert and oriented  to person, place, and time.   Psychiatric:         Mood and Affect: Mood normal.         Behavior: Behavior normal.         Thought Content: Thought content normal.         Judgment: Judgment normal.         Assessment / Plan      Assessment/Plan:   Chronic idiopathic constipation  Pelvic floor dysfunction    Aniya Lomeli is a 54-year-old female who presents to clinic for follow-up on chronic idiopathic constipation.  Patient notes that she has been doing poorly on current prescribed pharmacotherapy i.e. Linzess, Benefiber, and MiraLAX.  Patient reports symptoms consistent with overflow diarrhea as well as pelvic floor dysfunction.  Given ongoing issues, will transition to Amitiza 24 mcg p.o. twice daily with meals.  As needed lactulose ordered given her ongoing constipation.  Instructed to titrate dose of MiraLAX as tolerated.  Instructed to discontinue Benefiber given her significant bloating and begin FiberCon tablets.  Patient sent for KUB given ongoing constipation to evaluate fecal burden.    >>> KUB x 1 ordered  >>>Amitiza 24 mcg PO BID with meals; discontinue Linzess.  >>> PRN lactulose ordered  >>> Daily MiraLAX; currently on a capful and a half.   Titrate dose up by 1 capful weekly to a max of 4 per day in two separate doses.   Goal is for stool to be the texture of peanut butter   >>> Switch to FiberCon.   >>> Increase dietary fiber intake  >>> Increase dietary water intake  >>> Will refer to pelvic floor physical therapy for their evaluation and management given symptoms concerning for pelvic floor dysfunction.    Follow Up:   RTC 6-8 weeks.    Plan of care reviewed with the patient at the conclusion of today's visit.  Education was provided regarding diagnosis, management, and any prescribed or recommended OTC medications.  Patient verbalized understanding of and agreement with management plan.     Time Statement:   Discussed plan of care in detail with patient today. Patient verbally understands  and agrees. I have spent 40 minutes reviewing available diagnostics, obtaining history, examining the patient, developing a treatment plan, and educating the patient on disease process and plan of care.     Bandar Leigh, HOWARD, APRN, AGACNP-BC.  [facility]

## 2025-02-14 DIAGNOSIS — Z71.3 WEIGHT LOSS COUNSELING, ENCOUNTER FOR: ICD-10-CM

## 2025-02-17 RX ORDER — NALTREXONE HYDROCHLORIDE 50 MG/1
50 TABLET, FILM COATED ORAL DAILY
Qty: 30 TABLET | Refills: 2 | Status: SHIPPED | OUTPATIENT
Start: 2025-02-17

## 2025-02-20 ENCOUNTER — TELEMEDICINE (OUTPATIENT)
Dept: BARIATRICS/WEIGHT MGMT | Facility: CLINIC | Age: 55
End: 2025-02-20
Payer: COMMERCIAL

## 2025-02-20 VITALS — SYSTOLIC BLOOD PRESSURE: 121 MMHG | DIASTOLIC BLOOD PRESSURE: 79 MMHG | WEIGHT: 118.4 LBS | BODY MASS INDEX: 21.3 KG/M2

## 2025-02-20 DIAGNOSIS — F41.1 ANXIETY, GENERALIZED: ICD-10-CM

## 2025-02-20 DIAGNOSIS — E66.89 OTHER OBESITY NOT ELSEWHERE CLASSIFIED: Primary | ICD-10-CM

## 2025-02-20 PROCEDURE — 99214 OFFICE O/P EST MOD 30 MIN: CPT | Performed by: NURSE PRACTITIONER

## 2025-02-20 NOTE — ASSESSMENT & PLAN NOTE
In remission. Patient's (Body mass index is 21.30 kg/m².) is normal with comorbid health conditions that include obstructive sleep apnea, hypertension, dyslipidemias, and prediabetes  .   Do not advise losing any more fat mass, needs to work on preserving muscle mass.   Ok to move wegovy from every 21 days to every 14 days, has been stress eating . Continue naltrexone to help with increase in hunger and stress eating. Continue metformin.   Reviewed consequences of stress eating go beyond weight regain and actually worsen depression and anxiety. Encouraged to practice more self care and non-food rewards. At treatment goal, needs to work on increasing muscle mass.

## 2025-02-20 NOTE — ASSESSMENT & PLAN NOTE
Worsening due to losing her job and all of the responsibilities since so many coworkers left. Starting new job next week, will work from home. Encouraged to keep junk food out of the house. Do better with breakfast.

## 2025-02-20 NOTE — PROGRESS NOTES
Office Note      Date: 2025  Patient Name: Aniya Lomeli  MRN: [id number]  : 1970     Mode of Visit: Video  Location of patient: -HOME-  Location of provider: +HOME+  You have chosen to receive care through a telehealth visit.  The patient has signed the video visit consent form.  The visit included audio and video interaction. No technical issues occurred during this visit.    Subjective  Subjective     Chief Complaint  Obesity Management follow-up    Subjective          Aniya Lomeli presents to [facility] via telehealth for obesity management. s/p 21 LSG/HHR with Dr. Rao., Presurgery weight: 226 pounds.     Patient is unsatisfied with weight loss progress. Appetite is moderately controlled. States she is not hungry but she is stress eating and making poor choices. Her job was terminated but she has to work there for another week and many of her coworkers have left so she has more responsibilities. Has accepted a position with a different company that is remote. Reports no side effects of prescribed medications today. Still taking wegovy every 3 weeks. Has about 3 pens left. She started amitiza for constipation, hasn't helped at all. Now seeing a physical therapist for pelvic floor therapy.   Yesterday's intake:  B: 3 cookies   D: 1 bag of mini popcorn  The patient is not exercising.  Review of Systems   Constitutional:  Negative for appetite change and fatigue.   Eyes:  Negative for visual disturbance.   Cardiovascular:  Negative for chest pain and palpitations.   Gastrointestinal:  Positive for constipation. Negative for indigestion.   Endocrine: Negative for polydipsia, polyphagia and polyuria.   Neurological:  Negative for light-headedness.   Psychiatric/Behavioral:  Positive for sleep disturbance and stress.          Objective   Start weight MWM: 187 pounds.    Total weight loss: -69 pounds/-36.8%  Change in weight since last visit:  -1lb    Body mass index is 21.3 kg/m².   Measurements (in inches)     /79   Wt 53.7 kg (118 lb 6.4 oz)   BMI 21.30 kg/m²       Result Review :                 Assessment and Plan    Diagnoses and all orders for this visit:    1. Other obesity not elsewhere classified (Primary)  Assessment & Plan:  In remission. Patient's (Body mass index is 21.30 kg/m².) is normal with comorbid health conditions that include obstructive sleep apnea, hypertension, dyslipidemias, and prediabetes  .   Do not advise losing any more fat mass, needs to work on preserving muscle mass.   Ok to move wegovy from every 21 days to every 14 days, has been stress eating . Continue naltrexone to help with increase in hunger and stress eating. Continue metformin.   Reviewed consequences of stress eating go beyond weight regain and actually worsen depression and anxiety. Encouraged to practice more self care and non-food rewards. At treatment goal, needs to work on increasing muscle mass.              2. Anxiety, generalized  Assessment & Plan:  Worsening due to losing her job and all of the responsibilities since so many coworkers left. Starting new job next week, will work from home. Encouraged to keep junk food out of the house. Do better with breakfast.             Follow Up   Return in about 1 month (around 3/20/2025) for Next scheduled follow up.  Patient was given instructions and counseling regarding her condition or for health maintenance advice. Please see specific information pulled into the AVS if appropriate.     DEVIN Shultz

## 2025-03-28 RX ORDER — PANTOPRAZOLE SODIUM 40 MG/1
40 TABLET, DELAYED RELEASE ORAL DAILY
Qty: 90 TABLET | Refills: 3 | Status: SHIPPED | OUTPATIENT
Start: 2025-03-28

## 2025-04-02 ENCOUNTER — OFFICE VISIT (OUTPATIENT)
Dept: BARIATRICS/WEIGHT MGMT | Facility: CLINIC | Age: 55
End: 2025-04-02
Payer: COMMERCIAL

## 2025-04-02 VITALS
BODY MASS INDEX: 20.62 KG/M2 | DIASTOLIC BLOOD PRESSURE: 84 MMHG | HEART RATE: 72 BPM | SYSTOLIC BLOOD PRESSURE: 138 MMHG | HEIGHT: 63 IN | WEIGHT: 116.4 LBS

## 2025-04-02 DIAGNOSIS — F41.1 ANXIETY, GENERALIZED: ICD-10-CM

## 2025-04-02 DIAGNOSIS — L65.9 HAIR LOSS: ICD-10-CM

## 2025-04-02 DIAGNOSIS — R53.83 FATIGUE, UNSPECIFIED TYPE: ICD-10-CM

## 2025-04-02 DIAGNOSIS — E78.2 MIXED HYPERLIPIDEMIA: ICD-10-CM

## 2025-04-02 DIAGNOSIS — E66.89 OTHER OBESITY NOT ELSEWHERE CLASSIFIED: Primary | ICD-10-CM

## 2025-04-02 DIAGNOSIS — I10 PRIMARY HYPERTENSION: ICD-10-CM

## 2025-04-02 DIAGNOSIS — R73.03 PREDIABETES: ICD-10-CM

## 2025-04-02 PROCEDURE — 99214 OFFICE O/P EST MOD 30 MIN: CPT | Performed by: NURSE PRACTITIONER

## 2025-04-02 RX ORDER — SEMAGLUTIDE 2.68 MG/ML
2 INJECTION, SOLUTION SUBCUTANEOUS WEEKLY
Qty: 3 ML | Refills: 2 | Status: SHIPPED | OUTPATIENT
Start: 2025-04-02

## 2025-04-02 NOTE — PROGRESS NOTES
[facility]  [address]  [place] [address]     Office Note      Date: 2025  Patient Name: Aniya Lomeli  MRN: [id number]  : 1970  Subjective  Subjective     Chief Complaint  Obesity Management follow-up          Aniya Lomeli presents to [facility] for obesity management.   Patient is satisfied with weight loss progress. Appetite is moderately controlled, worsening, she took her last dose of wegovy 1.7mg and is interested in trying compounded semaglutide. Reports no side effects of prescribed medications today. The patient is taking multivitamin and is taking fish oil. The patient is not using a food journal.      She is doing better, being mindful of her choices, less stress eating.  She is working from home now and that has helped her limit un-necessary snacking.     24 hour recall:  Breakfast: Pure protein cereal w/ strawberries, 2% milk  Lunch: 3 slices cheese and 6 ritz crackers  Dinner: baked fish, vegetable medley  Snack: slice of arsheed food cake w/ strawberries  Water Intake:  oz  Other beverages: 1 diet coke  Alcohol: rarely    The patient is exercising elliptical- doing it more since she's working from home, hand weights 3 times per week with a FITT score of:    Frequency Intensity Time Strength Training   []   0, none []   0 []   0 []   0   []   1 (1-2x/week) []   1 (light) []   1 (<10 min) []   1 (1x/week)   [x]   2 (3-5x/week) [x]   2 (moderate) []   2 (10-20 min) [x]   2 (2x/week)   []   3 (daily) []   3 (moderately hard)  []   4 (very hard) [x]   3 (20-30 min)  []   4 (>30 min) []   3 (3-4x/week)           Review of Systems   Constitutional:  Negative for appetite change and fatigue.   Eyes:  Negative for visual disturbance.   Cardiovascular:  Negative for chest pain and palpitations.   Gastrointestinal:  Negative for constipation and indigestion.   Neurological:  Negative for  light-headedness.         Current Outpatient Medications:     Biotin 2.5 MG tablet, Take 2.5 mg by mouth Daily., Disp: , Rfl:     Cholecalciferol 25 MCG (1000 UT) tablet, Take 1 tablet by mouth Daily., Disp: , Rfl:     FLUoxetine (PROzac) 40 MG capsule, Take 1 capsule by mouth Daily., Disp: 90 capsule, Rfl: 3    Levonorgestrel (Mirena, 52 MG,) 20 MCG/DAY intrauterine device IUD, as directed Intrauterine, Disp: , Rfl:     loratadine (Claritin) 10 MG tablet, Take 1 tablet by mouth Daily., Disp: , Rfl:     meclizine (ANTIVERT) 12.5 MG tablet, Take 1 tablet by mouth 3 (Three) Times a Day As Needed for Dizziness., Disp: 21 tablet, Rfl: 0    metFORMIN ER (GLUCOPHAGE-XR) 750 MG 24 hr tablet, Take 2 tablets by mouth Daily With Dinner., Disp: 180 tablet, Rfl: 0    METHYLCOBALAMIN IJ, Inject 1 mg as directed 1 (One) Time Per Week., Disp: , Rfl:     Multiple Vitamin (MULTI VITAMIN DAILY PO), Take 1 tablet by mouth Daily., Disp: , Rfl:     naltrexone (DEPADE) 50 MG tablet, Take 1 tablet by mouth Daily., Disp: 30 tablet, Rfl: 2    Omega-3 Fatty Acids (fish oil) 1000 MG capsule capsule, Take 1 capsule by mouth 2 (Two) Times a Day With Meals. (Patient taking differently: Take 1 capsule by mouth Daily With Breakfast.), Disp: , Rfl:     pantoprazole (PROTONIX) 40 MG EC tablet, Take 1 tablet by mouth Daily., Disp: 90 tablet, Rfl: 3    valACYclovir (Valtrex) 500 MG tablet, Take 1 tablet by mouth 3 (Three) Times a Day., Disp: 30 tablet, Rfl: 5    lubiprostone (Amitiza) 24 MCG capsule, Take 1 capsule by mouth 2 (Two) Times a Day With Meals. (Patient not taking: Reported on 4/2/2025), Disp: 60 capsule, Rfl: 2    Semaglutide, 2 MG/DOSE, (Ozempic, 2 MG/DOSE,) 8 MG/3ML solution pen-injector, Inject 2 mg under the skin into the appropriate area as directed 1 (One) Time Per Week., Disp: 3 mL, Rfl: 2    Objective   Start weight: 187 pounds.    Total weight loss: -70.6 pounds/-37.75%  Change in weight since last visit: -2.2lb    Body mass index  "is 20.95 kg/m².   Body composition analysis completed and showed:   Body Fat %: 22.0    Measurements (in inches)  Waist Circumference: 30.5      Vital Signs:   /84 (BP Location: Left arm, Patient Position: Sitting)   Pulse 72   Ht 158.8 cm (62.5\")   Wt 52.8 kg (116 lb 6.4 oz)   BMI 20.95 kg/m²     No LMP recorded (lmp unknown). Patient is postmenopausal.    Physical Exam   General appears stated age and normal appearance   HEENT PERRLA, EOM intact, and conjunctivae normal   Chest/lungs Normal rate, Regular rhythm, and Breathing is unlabored   Extremities without edema   Neuro Good historian and No focal deficit   Skin Warm, dry, intact   Psych normal behavior, normal thought content, and normal concentration     Result Review :                Assessment / Plan        Diagnoses and all orders for this visit:    1. Other obesity not elsewhere classified (Primary)  Assessment & Plan:  In remission. Patient's (Body mass index is 21.95 kg/m².) is normal with comorbid health conditions that include obstructive sleep apnea, hypertension, dyslipidemias, and prediabetes  .   Do not advise losing any more fat mass, needs to work on preserving muscle mass.   Hunger has been worsening as she decreases frequency of wegovy. She just used her last 1.7mg pen. Will start compounded semaglutide to help with appetite and weight loss maintenance.    Continue naltrexone to help with increase in hunger and stress eating. Continue metformin.    At treatment goal, needs to work on increasing muscle mass. Provided resources including darebee exercises.   Repeat labs.              Orders:  -     Semaglutide, 2 MG/DOSE, (Ozempic, 2 MG/DOSE,) 8 MG/3ML solution pen-injector; Inject 2 mg under the skin into the appropriate area as directed 1 (One) Time Per Week.  Dispense: 3 mL; Refill: 2  -     Comprehensive Metabolic Panel  -     Hemoglobin A1c  -     Lipid Panel  -     TSH  -     CBC (No Diff)    2. Prediabetes  Overview:  A1C " 6.1  1/31/2024 A1c 5.1 on wegovy 2.4mg    Orders:  -     Comprehensive Metabolic Panel  -     Hemoglobin A1c  -     Lipid Panel  -     TSH    3. Anxiety, generalized  -     Comprehensive Metabolic Panel  -     TSH    4. Primary hypertension  -     Comprehensive Metabolic Panel  -     Hemoglobin A1c  -     Lipid Panel  -     TSH  -     CBC (No Diff)    5. Fatigue, unspecified type  -     Comprehensive Metabolic Panel  -     Hemoglobin A1c  -     TSH  -     CBC (No Diff)    6. Mixed hyperlipidemia  -     Comprehensive Metabolic Panel  -     Hemoglobin A1c  -     Lipid Panel  -     CBC (No Diff)    7. Hair loss  Assessment & Plan:  Started b vitamins and that is helping.             Follow Up   No follow-ups on file.  Patient was given instructions and counseling regarding her condition or for health maintenance advice. Please see specific information pulled into the AVS if appropriate.     Annette Reeves, APRN  04/02/2025

## 2025-04-02 NOTE — ASSESSMENT & PLAN NOTE
In remission. Patient's (Body mass index is 21.95 kg/m².) is normal with comorbid health conditions that include obstructive sleep apnea, hypertension, dyslipidemias, and prediabetes  .   Do not advise losing any more fat mass, needs to work on preserving muscle mass.   Hunger has been worsening as she decreases frequency of wegovy. She just used her last 1.7mg pen. Will start compounded semaglutide to help with appetite and weight loss maintenance.    Continue naltrexone to help with increase in hunger and stress eating. Continue metformin.    At treatment goal, needs to work on increasing muscle mass. Provided resources including darebee exercises.   Repeat labs.

## 2025-04-03 LAB
ALBUMIN SERPL-MCNC: 4.3 G/DL (ref 3.5–5.2)
ALBUMIN/GLOB SERPL: 2 G/DL
ALP SERPL-CCNC: 73 U/L (ref 39–117)
ALT SERPL-CCNC: 13 U/L (ref 1–33)
AST SERPL-CCNC: 18 U/L (ref 1–32)
BILIRUB SERPL-MCNC: 0.5 MG/DL (ref 0–1.2)
BUN SERPL-MCNC: 9 MG/DL (ref 6–20)
BUN/CREAT SERPL: 10.8 (ref 7–25)
CALCIUM SERPL-MCNC: 9.6 MG/DL (ref 8.6–10.5)
CHLORIDE SERPL-SCNC: 104 MMOL/L (ref 98–107)
CHOLEST SERPL-MCNC: 174 MG/DL (ref 0–200)
CO2 SERPL-SCNC: 28.7 MMOL/L (ref 22–29)
CREAT SERPL-MCNC: 0.83 MG/DL (ref 0.57–1)
EGFRCR SERPLBLD CKD-EPI 2021: 84.4 ML/MIN/1.73
ERYTHROCYTE [DISTWIDTH] IN BLOOD BY AUTOMATED COUNT: 13 % (ref 12.3–15.4)
GLOBULIN SER CALC-MCNC: 2.2 GM/DL
GLUCOSE SERPL-MCNC: 64 MG/DL (ref 65–99)
HBA1C MFR BLD: 5 % (ref 4.8–5.6)
HCT VFR BLD AUTO: 42.2 % (ref 34–46.6)
HDLC SERPL-MCNC: 62 MG/DL (ref 40–60)
HGB BLD-MCNC: 13.8 G/DL (ref 12–15.9)
LDLC SERPL CALC-MCNC: 100 MG/DL (ref 0–100)
MCH RBC QN AUTO: 28.5 PG (ref 26.6–33)
MCHC RBC AUTO-ENTMCNC: 32.7 G/DL (ref 31.5–35.7)
MCV RBC AUTO: 87 FL (ref 79–97)
PLATELET # BLD AUTO: 306 10*3/MM3 (ref 140–450)
POTASSIUM SERPL-SCNC: 4 MMOL/L (ref 3.5–5.2)
PROT SERPL-MCNC: 6.5 G/DL (ref 6–8.5)
RBC # BLD AUTO: 4.85 10*6/MM3 (ref 3.77–5.28)
SODIUM SERPL-SCNC: 145 MMOL/L (ref 136–145)
TRIGL SERPL-MCNC: 63 MG/DL (ref 0–150)
TSH SERPL DL<=0.005 MIU/L-ACNC: 2.06 UIU/ML (ref 0.27–4.2)
VLDLC SERPL CALC-MCNC: 12 MG/DL (ref 5–40)
WBC # BLD AUTO: 6.04 10*3/MM3 (ref 3.4–10.8)

## 2025-05-05 ENCOUNTER — TELEMEDICINE (OUTPATIENT)
Dept: BARIATRICS/WEIGHT MGMT | Facility: CLINIC | Age: 55
End: 2025-05-05
Payer: COMMERCIAL

## 2025-05-05 VITALS
SYSTOLIC BLOOD PRESSURE: 125 MMHG | BODY MASS INDEX: 20.7 KG/M2 | DIASTOLIC BLOOD PRESSURE: 91 MMHG | WEIGHT: 116.8 LBS | HEIGHT: 63 IN | HEART RATE: 90 BPM

## 2025-05-05 DIAGNOSIS — Z71.3 WEIGHT LOSS COUNSELING, ENCOUNTER FOR: Primary | ICD-10-CM

## 2025-05-05 DIAGNOSIS — E66.89 OTHER OBESITY NOT ELSEWHERE CLASSIFIED: ICD-10-CM

## 2025-05-05 PROCEDURE — 99213 OFFICE O/P EST LOW 20 MIN: CPT | Performed by: NURSE PRACTITIONER

## 2025-05-05 RX ORDER — NALTREXONE HYDROCHLORIDE 50 MG/1
50 TABLET, FILM COATED ORAL DAILY
Qty: 30 TABLET | Refills: 2 | Status: SHIPPED | OUTPATIENT
Start: 2025-05-05

## 2025-05-05 NOTE — ASSESSMENT & PLAN NOTE
In remission. Patient's (Body mass index is 21.02 kg/m².) is normal with comorbid health conditions that include obstructive sleep apnea, hypertension, dyslipidemias, and prediabetes.   Again: Do not advise losing any more fat mass, needs to work on preserving muscle mass.   Started compounded semaglutide to help with appetite and weight loss maintenance last month, that is no longer an option..    At treatment goal, needs to work on increasing muscle mass. Provided resources including darebee exercises.   Labs UTD.

## 2025-05-05 NOTE — PROGRESS NOTES
"     Office Note      Date: 2025  Patient Name: Aniya Lomeli  MRN: [id number]  : 1970     Mode of Visit: Video  Location of patient: -HOME-  Location of provider: +[facility]+  You have chosen to receive care through a telehealth visit.  The patient has signed the video visit consent form.  The visit included audio and video interaction. No technical issues occurred during this visit.    Subjective  Subjective     Chief Complaint  Obesity Management follow-up    Subjective          Aniya Lomeli presents to [facility] via telehealth for obesity management.     Patient is satisfied with weight loss progress. Appetite is well controlled. Last dose of compounded semaglutide was 8 days ago. Reports no side effects of prescribed medications today.     24 hour recall:  Breakfast: 2 scrambled eggs, 1/2 piece white toast  Lunch: 1/2 grilled chicken sandwich on bun w/lettuce, tomato  Dinner: 1/2 cup Mexican casserole  Snacks:Mini bag popcorn  Water: 80 oz  Beverages: 1 diet soda, unsweet tea  Alcohol: none    The patient is exercising by elliptical, resistance training.    Review of Systems   Constitutional:  Negative for appetite change and fatigue.   Eyes:  Negative for visual disturbance.   Cardiovascular:  Negative for chest pain and palpitations.   Gastrointestinal:  Negative for constipation and indigestion.   Neurological:  Negative for light-headedness.       Objective   Start weight: 187 pounds.    Total weight loss: -70.2 pounds/-37.54%  Change in weight since last visit: +0.2    Body mass index is 21.02 kg/m².   Measurements (in inches)     /91   Pulse 90   Ht 158.8 cm (62.5\")   Wt 53 kg (116 lb 12.8 oz)   BMI 21.02 kg/m²       Result Review :                 Assessment and Plan    Diagnoses and all orders for this visit:    1. Weight loss counseling, encounter for (Primary)  Assessment & Plan:  The current plan for this month includes:   - " Continue current exercise efforts including resistance training.  - Continue to work on mindful eating.   - Continue naltrexone and metformin.   - Discussed compounded versions of semaglutide and tirzepatide are no longer legal since the shortage is over. Discussed pricing for wegovy and zepbound, she is going to discuss it with her  and let me know which brand she would like to try. Was successful on wegovy, had to come off due to insurance change.       Orders:  -     naltrexone (DEPADE) 50 MG tablet; Take 1 tablet by mouth Daily.  Dispense: 30 tablet; Refill: 2    2. Other obesity not elsewhere classified  Assessment & Plan:  In remission. Patient's (Body mass index is 21.02 kg/m².) is normal with comorbid health conditions that include obstructive sleep apnea, hypertension, dyslipidemias, and prediabetes.   Again: Do not advise losing any more fat mass, needs to work on preserving muscle mass.   Started compounded semaglutide to help with appetite and weight loss maintenance last month, that is no longer an option..    At treatment goal, needs to work on increasing muscle mass. Provided resources including darebee exercises.   Labs UTD.            Follow Up   Return in about 6 weeks (around 6/16/2025) for Next scheduled follow up.  Patient was given instructions and counseling regarding her condition or for health maintenance advice. Please see specific information pulled into the AVS if appropriate.     DEVIN Shultz

## 2025-05-05 NOTE — ASSESSMENT & PLAN NOTE
The current plan for this month includes:   - Continue current exercise efforts including resistance training.  - Continue to work on mindful eating.   - Continue naltrexone and metformin.   - Discussed compounded versions of semaglutide and tirzepatide are no longer legal since the shortage is over. Discussed pricing for wegovy and zepbound, she is going to discuss it with her  and let me know which brand she would like to try. Was successful on wegovy, had to come off due to insurance change.

## 2025-06-19 ENCOUNTER — TELEMEDICINE (OUTPATIENT)
Dept: BARIATRICS/WEIGHT MGMT | Facility: CLINIC | Age: 55
End: 2025-06-19
Payer: COMMERCIAL

## 2025-06-19 VITALS
HEIGHT: 63 IN | DIASTOLIC BLOOD PRESSURE: 91 MMHG | SYSTOLIC BLOOD PRESSURE: 144 MMHG | HEART RATE: 74 BPM | WEIGHT: 117 LBS | BODY MASS INDEX: 20.73 KG/M2

## 2025-06-19 DIAGNOSIS — E66.813 CLASS 3 OBESITY: Primary | ICD-10-CM

## 2025-06-19 DIAGNOSIS — R73.03 PREDIABETES: ICD-10-CM

## 2025-06-19 PROCEDURE — 99214 OFFICE O/P EST MOD 30 MIN: CPT | Performed by: NURSE PRACTITIONER

## 2025-06-19 RX ORDER — SEMAGLUTIDE 2.4 MG/.75ML
2.4 INJECTION, SOLUTION SUBCUTANEOUS WEEKLY
Qty: 3 ML | Refills: 0 | Status: SHIPPED | OUTPATIENT
Start: 2025-06-19

## 2025-06-19 NOTE — PROGRESS NOTES
Office Note      Date: 2025  Patient Name: Aniya Lomeli  MRN: [id number]  : 1970     Mode of Visit: Video  Location of patient: -HOME-  Location of provider: +[facility]+  You have chosen to receive care through a telehealth visit.  The patient has signed the video visit consent form.  The visit included audio and video interaction. No technical issues occurred during this visit.    Subjective  Subjective     Chief Complaint  Obesity Management follow-up    Subjective          Aniya Lomeli presents to [facility] via telehealth for obesity management.     Patient is satisfied with weight loss progress. Staying 116-118lb. Appetite is well controlled. Reports no side effects of prescribed medications today. Had a few wegovy 1.7mg pens left from previous prescription. Has been taking every 2 weeks. Used last one last weekend. Desires to continue, this is no longer on her insurance formulary. Admits she's been stress eating some (not always making the best choices)- she does not enjoy her new job.     She is not keeping a food journal.  24 hour recall:  B: homemade sausage and biscuit (got bored with protein cereal)  L: 1/2 ham and cheese sandwich on white bread, handful of potato chips  S: mini bag of popcorn  D: fish (baked- breaded) and vegetable    The patient is exercising- swimming in her pool every day during lunch.   Sleep is good.     Review of Systems   Constitutional:  Negative for appetite change and fatigue.   Eyes:  Negative for visual disturbance.   Cardiovascular:  Negative for chest pain and palpitations.   Gastrointestinal:  Negative for constipation and indigestion.   Endocrine: Negative for polydipsia, polyphagia and polyuria.   Neurological:  Negative for light-headedness.   Psychiatric/Behavioral:  Positive for stress.          Objective   Start weight MWM: 187 pounds.    Total weight loss: -70 pounds/-37%  Change in weight since  "last visit: +0.5lb    Body mass index is 21.06 kg/m².   Measurements (in inches)     /91   Pulse 74   Ht 158.8 cm (62.5\")   Wt 53.1 kg (117 lb)   BMI 21.06 kg/m²       Result Review :                 Assessment and Plan    Diagnoses and all orders for this visit:    1. Class 3 obesity (Primary)  Overview:  s/p 1/28/21 LSG/HHR with Dr. Rao., Presurgery weight: 226 pounds.    Assessment & Plan:  Chronic. In remission. Patient's (Body mass index is 21.06 kg/m².) is normal with comorbid health conditions that include obstructive sleep apnea, hypertension, dyslipidemias, and prediabetes.   Again: Do not advise losing any more fat mass, needs to work on preserving muscle mass.   Has been taking wegovy 1.7mg every other week, if she goes beyond 2 weeks her hunger increases. She desires to continue although it is very expensive. Will use promotion to get 2.4mg this month, plans to take every 14 days.   Con't naltrexone, helps with sweets cravings.     Orders:  -     Semaglutide-Weight Management (Wegovy) 2.4 MG/0.75ML solution auto-injector; Inject 0.75 mL under the skin into the appropriate area as directed 1 (One) Time Per Week.  Dispense: 3 mL; Refill: 0    2. Prediabetes  Overview:  A1C 6.1  1/31/2024 A1c 5.1 on wegovy 2.4mg    Assessment & Plan:  Stable. Denies hypoglycemia. Continue low carb diet, regular physical activity, and weight reduction of 10-15%. Con't metformin and wegovy.               Follow Up   Return in about 1 month (around 7/19/2025) for Next scheduled follow up.  Patient was given instructions and counseling regarding her condition or for health maintenance advice. Please see specific information pulled into the AVS if appropriate.     Annette Reeves, DEVIN    "

## 2025-06-19 NOTE — ASSESSMENT & PLAN NOTE
Chronic. In remission. Patient's (Body mass index is 21.06 kg/m².) is normal with comorbid health conditions that include obstructive sleep apnea, hypertension, dyslipidemias, and prediabetes.   Again: Do not advise losing any more fat mass, needs to work on preserving muscle mass.   Has been taking wegovy 1.7mg every other week, if she goes beyond 2 weeks her hunger increases. She desires to continue although it is very expensive. Will use promotion to get 2.4mg this month, plans to take every 14 days.   Con't naltrexone, helps with sweets cravings.

## 2025-06-19 NOTE — ASSESSMENT & PLAN NOTE
Stable. Denies hypoglycemia. Continue low carb diet, regular physical activity, and weight reduction of 10-15%. Con't metformin and wegovy.

## 2025-08-18 ENCOUNTER — PATIENT MESSAGE (OUTPATIENT)
Dept: BARIATRICS/WEIGHT MGMT | Facility: CLINIC | Age: 55
End: 2025-08-18
Payer: COMMERCIAL

## 2025-08-18 DIAGNOSIS — E66.813 CLASS 3 OBESITY: ICD-10-CM

## 2025-08-19 RX ORDER — SEMAGLUTIDE 2.4 MG/.75ML
2.4 INJECTION, SOLUTION SUBCUTANEOUS WEEKLY
Qty: 3 ML | Refills: 0 | Status: SHIPPED | OUTPATIENT
Start: 2025-08-19 | End: 2025-08-25

## 2025-08-20 DIAGNOSIS — E66.813 CLASS 3 OBESITY: ICD-10-CM

## 2025-08-20 RX ORDER — SEMAGLUTIDE 2.4 MG/.75ML
2.4 INJECTION, SOLUTION SUBCUTANEOUS WEEKLY
Qty: 3 ML | Refills: 0 | Status: CANCELLED | OUTPATIENT
Start: 2025-08-20

## 2025-08-25 RX ORDER — SEMAGLUTIDE 2.4 MG/.75ML
2.4 INJECTION, SOLUTION SUBCUTANEOUS WEEKLY
Qty: 3 ML | Refills: 0 | Status: SHIPPED | OUTPATIENT
Start: 2025-08-25

## (undated) DEVICE — ANTIBACTERIAL VIOLET BRAIDED (POLYGLACTIN 910), SYNTHETIC ABSORBABLE SUTURE: Brand: COATED VICRYL

## (undated) DEVICE — ENDOPATH XCEL UNIVERSAL TROCAR STABLILITY SLEEVES: Brand: ENDOPATH XCEL

## (undated) DEVICE — DEV LK WIREGUIDE FUSN OLYMP SCP

## (undated) DEVICE — Device

## (undated) DEVICE — COVER,LIGHT HANDLE,FLX,1/PK: Brand: MEDLINE INDUSTRIES, INC.

## (undated) DEVICE — APPL CHLORAPREP TINTED 26ML TEAL

## (undated) DEVICE — SUT SILK 2/0 TIES 18IN A185H

## (undated) DEVICE — CONTN GRAD MEAS TRIANG 32OZ BLK

## (undated) DEVICE — SPHINCTEROTOME: Brand: DREAMTOME™ RX 44

## (undated) DEVICE — ENDOPATH XCEL BLADELESS TROCARS WITH STABILITY SLEEVES: Brand: ENDOPATH XCEL

## (undated) DEVICE — GLV SURG SENSICARE MICRO PF LF 7 STRL

## (undated) DEVICE — SUT VIC 0 UR6 27IN VCP603H

## (undated) DEVICE — SNAP KOVER: Brand: UNBRANDED

## (undated) DEVICE — [HIGH FLOW INSUFFLATOR,  DO NOT USE IF PACKAGE IS DAMAGED,  KEEP DRY,  KEEP AWAY FROM SUNLIGHT,  PROTECT FROM HEAT AND RADIOACTIVE SOURCES.]: Brand: PNEUMOSURE

## (undated) DEVICE — TROCAR: Brand: KII FIOS FIRST ENTRY

## (undated) DEVICE — UNDRPD COMFRT GLD DRYPAD 36X57IN

## (undated) DEVICE — ENDOPATH 5MM ENDOSCOPIC BLUNT TIP DISSECTORS (12 POUCHES CONTAINING 3 DISSECTORS EACH): Brand: ENDOPATH

## (undated) DEVICE — SYR LUERLOK 30CC

## (undated) DEVICE — MARYLAND JAW LAPAROSCOPIC SEALER/DIVIDER COATED: Brand: LIGASURE

## (undated) DEVICE — VISUALIZATION SYSTEM: Brand: CLEARIFY

## (undated) DEVICE — ENDOPOUCH RETRIEVER SPECIMEN RETRIEVAL BAGS: Brand: ENDOPOUCH RETRIEVER

## (undated) DEVICE — CVR HNDL LIGHT RIGID

## (undated) DEVICE — POWER SHELL: Brand: SIGNIA

## (undated) DEVICE — SHT AIR TRANSFR COMFRT GLIDE LAT 40X80IN

## (undated) DEVICE — ENDOCUT SCISSOR TIP, DISPOSABLE: Brand: RENEW

## (undated) DEVICE — PK LAP LASR CHOLE 10

## (undated) DEVICE — ANTIBACTERIAL UNDYED BRAIDED (POLYGLACTIN 910), SYNTHETIC ABSORBABLE SUTURE: Brand: COATED VICRYL

## (undated) DEVICE — ST EXT IV TBG W SECUR LK 20IN

## (undated) DEVICE — PENROSE DRAIN 18 X .5" SILICONE: Brand: MEDLINE

## (undated) DEVICE — ERBE NESSY®PLATE 170 SPLIT; 168CM²; CABLE 3M: Brand: ERBE

## (undated) DEVICE — DRSNG TELFA PAD NONADH STR 1S 3X8IN

## (undated) DEVICE — GLV SURG SENSICARE PI MIC PF SZ8.5 LF STRL

## (undated) DEVICE — 3M™ STERI-STRIP™ REINFORCED ADHESIVE SKIN CLOSURES, R1547, 1/2 IN X 4 IN (12 MM X 100 MM), 6 STRIPS/ENVELOPE: Brand: 3M™ STERI-STRIP™

## (undated) DEVICE — DRSNG SURESITE WNDW 2.38X2.75

## (undated) DEVICE — SYR LUERLOK 20CC

## (undated) DEVICE — GLV SURG SENSICARE PI MIC PF SZ9 LF STRL

## (undated) DEVICE — CLMP STD 22CM DISP

## (undated) DEVICE — FLTR PLUMEPORT LAP W/CONN STRL

## (undated) DEVICE — INTENDED USE FOR SURGICAL MARKING ON INTACT SKIN, ALSO PROVIDES A PERMANENT METHOD OF IDENTIFYING OBJECTS IN THE OPERATING ROOM: Brand: WRITESITE® REGULAR TIP SKIN MARKER

## (undated) DEVICE — GOWN,PREVENTION PLUS,XXLARGE,STERILE: Brand: MEDLINE

## (undated) DEVICE — Device: Brand: DEFENDO AIR/WATER/SUCTION AND BIOPSY VALVE

## (undated) DEVICE — RETRIEVAL BALLOON CATHETER: Brand: EXTRACTOR™ PRO RX

## (undated) DEVICE — GOWN,NON-REINFORCED,SIRUS,SET IN SLV,XXL: Brand: MEDLINE

## (undated) DEVICE — PK BARIATRIC 10

## (undated) DEVICE — GLV SURG SENSICARE MICRO PF LF 7.5 STRL

## (undated) DEVICE — [HIGH FLOW HEATED INSUFFLATOR TUBING,  DO NOT USE IF PACKAGE IS DAMAGED]

## (undated) DEVICE — LUER-LOK 360°: Brand: CONNECTA, LUER-LOK